# Patient Record
Sex: FEMALE | Race: ASIAN | NOT HISPANIC OR LATINO | Employment: OTHER | URBAN - METROPOLITAN AREA
[De-identification: names, ages, dates, MRNs, and addresses within clinical notes are randomized per-mention and may not be internally consistent; named-entity substitution may affect disease eponyms.]

---

## 2017-05-19 ENCOUNTER — HOSPITAL ENCOUNTER (OUTPATIENT)
Dept: LAB | Facility: MEDICAL CENTER | Age: 82
End: 2017-05-19
Attending: INTERNAL MEDICINE
Payer: MEDICARE

## 2017-05-19 ENCOUNTER — HOSPITAL ENCOUNTER (OUTPATIENT)
Facility: MEDICAL CENTER | Age: 82
End: 2017-05-19
Attending: INTERNAL MEDICINE
Payer: MEDICARE

## 2017-05-19 LAB
ALBUMIN SERPL BCP-MCNC: 4.2 G/DL (ref 3.2–4.9)
ALBUMIN/GLOB SERPL: 1.2 G/DL
ALP SERPL-CCNC: 54 U/L (ref 30–99)
ALT SERPL-CCNC: 12 U/L (ref 2–50)
ANION GAP SERPL CALC-SCNC: 8 MMOL/L (ref 0–11.9)
AST SERPL-CCNC: 34 U/L (ref 12–45)
BASOPHILS # BLD AUTO: 0.8 % (ref 0–1.8)
BASOPHILS # BLD: 0.05 K/UL (ref 0–0.12)
BILIRUB SERPL-MCNC: 0.4 MG/DL (ref 0.1–1.5)
BUN SERPL-MCNC: 31 MG/DL (ref 8–22)
CALCIUM SERPL-MCNC: 9.4 MG/DL (ref 8.5–10.5)
CHLORIDE SERPL-SCNC: 108 MMOL/L (ref 96–112)
CHOLEST SERPL-MCNC: 191 MG/DL (ref 100–199)
CO2 SERPL-SCNC: 20 MMOL/L (ref 20–33)
CREAT SERPL-MCNC: 1.41 MG/DL (ref 0.5–1.4)
EOSINOPHIL # BLD AUTO: 0.04 K/UL (ref 0–0.51)
EOSINOPHIL NFR BLD: 0.6 % (ref 0–6.9)
ERYTHROCYTE [DISTWIDTH] IN BLOOD BY AUTOMATED COUNT: 50.6 FL (ref 35.9–50)
GFR SERPL CREATININE-BSD FRML MDRD: 35 ML/MIN/1.73 M 2
GLOBULIN SER CALC-MCNC: 3.4 G/DL (ref 1.9–3.5)
GLUCOSE SERPL-MCNC: 87 MG/DL (ref 65–99)
HCT VFR BLD AUTO: 36.6 % (ref 37–47)
HDLC SERPL-MCNC: 46 MG/DL
HGB BLD-MCNC: 11.4 G/DL (ref 12–16)
IMM GRANULOCYTES # BLD AUTO: 0.02 K/UL (ref 0–0.11)
IMM GRANULOCYTES NFR BLD AUTO: 0.3 % (ref 0–0.9)
LDLC SERPL CALC-MCNC: 100 MG/DL
LYMPHOCYTES # BLD AUTO: 2.29 K/UL (ref 1–4.8)
LYMPHOCYTES NFR BLD: 35 % (ref 22–41)
MCH RBC QN AUTO: 31.8 PG (ref 27–33)
MCHC RBC AUTO-ENTMCNC: 31.1 G/DL (ref 33.6–35)
MCV RBC AUTO: 101.9 FL (ref 81.4–97.8)
MONOCYTES # BLD AUTO: 0.41 K/UL (ref 0–0.85)
MONOCYTES NFR BLD AUTO: 6.3 % (ref 0–13.4)
NEUTROPHILS # BLD AUTO: 3.74 K/UL (ref 2–7.15)
NEUTROPHILS NFR BLD: 57 % (ref 44–72)
NRBC # BLD AUTO: 0 K/UL
NRBC BLD AUTO-RTO: 0 /100 WBC
PLATELET # BLD AUTO: 205 K/UL (ref 164–446)
PMV BLD AUTO: 9.9 FL (ref 9–12.9)
POTASSIUM SERPL-SCNC: 4.8 MMOL/L (ref 3.6–5.5)
PROT SERPL-MCNC: 7.6 G/DL (ref 6–8.2)
RBC # BLD AUTO: 3.59 M/UL (ref 4.2–5.4)
SODIUM SERPL-SCNC: 136 MMOL/L (ref 135–145)
T4 SERPL-MCNC: 7.5 UG/DL (ref 4–12)
TRIGL SERPL-MCNC: 224 MG/DL (ref 0–149)
TSH SERPL DL<=0.005 MIU/L-ACNC: 0.67 UIU/ML (ref 0.3–3.7)
WBC # BLD AUTO: 6.6 K/UL (ref 4.8–10.8)

## 2017-05-19 PROCEDURE — 85025 COMPLETE CBC W/AUTO DIFF WBC: CPT

## 2017-05-19 PROCEDURE — 80053 COMPREHEN METABOLIC PANEL: CPT

## 2017-05-19 PROCEDURE — 80061 LIPID PANEL: CPT

## 2017-05-19 PROCEDURE — 84436 ASSAY OF TOTAL THYROXINE: CPT

## 2017-05-19 PROCEDURE — 82274 ASSAY TEST FOR BLOOD FECAL: CPT

## 2017-05-19 PROCEDURE — 84443 ASSAY THYROID STIM HORMONE: CPT

## 2017-05-19 PROCEDURE — 36415 COLL VENOUS BLD VENIPUNCTURE: CPT

## 2017-05-21 LAB — HEMOCCULT STL QL IA: POSITIVE

## 2017-09-19 ENCOUNTER — HOSPITAL ENCOUNTER (OUTPATIENT)
Dept: LAB | Facility: MEDICAL CENTER | Age: 82
End: 2017-09-19
Attending: INTERNAL MEDICINE
Payer: MEDICARE

## 2017-09-19 LAB
25(OH)D3 SERPL-MCNC: 32 NG/ML (ref 30–100)
ALBUMIN SERPL BCP-MCNC: 4.2 G/DL (ref 3.2–4.9)
ALBUMIN/GLOB SERPL: 1.2 G/DL
ALP SERPL-CCNC: 59 U/L (ref 30–99)
ALT SERPL-CCNC: 13 U/L (ref 2–50)
ANION GAP SERPL CALC-SCNC: 11 MMOL/L (ref 0–11.9)
AST SERPL-CCNC: 24 U/L (ref 12–45)
BASOPHILS # BLD AUTO: 0.5 % (ref 0–1.8)
BASOPHILS # BLD: 0.03 K/UL (ref 0–0.12)
BILIRUB SERPL-MCNC: 0.6 MG/DL (ref 0.1–1.5)
BUN SERPL-MCNC: 24 MG/DL (ref 8–22)
CALCIUM SERPL-MCNC: 9.8 MG/DL (ref 8.5–10.5)
CHLORIDE SERPL-SCNC: 108 MMOL/L (ref 96–112)
CHOLEST SERPL-MCNC: 232 MG/DL (ref 100–199)
CO2 SERPL-SCNC: 22 MMOL/L (ref 20–33)
CREAT SERPL-MCNC: 1.16 MG/DL (ref 0.5–1.4)
CREAT UR-MCNC: 131.1 MG/DL
EOSINOPHIL # BLD AUTO: 0.04 K/UL (ref 0–0.51)
EOSINOPHIL NFR BLD: 0.6 % (ref 0–6.9)
ERYTHROCYTE [DISTWIDTH] IN BLOOD BY AUTOMATED COUNT: 50.8 FL (ref 35.9–50)
EST. AVERAGE GLUCOSE BLD GHB EST-MCNC: 114 MG/DL
GFR SERPL CREATININE-BSD FRML MDRD: 44 ML/MIN/1.73 M 2
GLOBULIN SER CALC-MCNC: 3.6 G/DL (ref 1.9–3.5)
GLUCOSE SERPL-MCNC: 88 MG/DL (ref 65–99)
HBA1C MFR BLD: 5.6 % (ref 0–5.6)
HCT VFR BLD AUTO: 36.3 % (ref 37–47)
HDLC SERPL-MCNC: 50 MG/DL
HGB BLD-MCNC: 11.7 G/DL (ref 12–16)
IMM GRANULOCYTES # BLD AUTO: 0.02 K/UL (ref 0–0.11)
IMM GRANULOCYTES NFR BLD AUTO: 0.3 % (ref 0–0.9)
LDLC SERPL CALC-MCNC: 148 MG/DL
LYMPHOCYTES # BLD AUTO: 2.25 K/UL (ref 1–4.8)
LYMPHOCYTES NFR BLD: 34.5 % (ref 22–41)
MCH RBC QN AUTO: 32.1 PG (ref 27–33)
MCHC RBC AUTO-ENTMCNC: 32.2 G/DL (ref 33.6–35)
MCV RBC AUTO: 99.7 FL (ref 81.4–97.8)
MICROALBUMIN UR-MCNC: 11.6 MG/DL
MICROALBUMIN/CREAT UR: 88 MG/G (ref 0–30)
MONOCYTES # BLD AUTO: 0.37 K/UL (ref 0–0.85)
MONOCYTES NFR BLD AUTO: 5.7 % (ref 0–13.4)
NEUTROPHILS # BLD AUTO: 3.82 K/UL (ref 2–7.15)
NEUTROPHILS NFR BLD: 58.4 % (ref 44–72)
NRBC # BLD AUTO: 0 K/UL
NRBC BLD AUTO-RTO: 0 /100 WBC
PLATELET # BLD AUTO: 233 K/UL (ref 164–446)
PMV BLD AUTO: 9.4 FL (ref 9–12.9)
POTASSIUM SERPL-SCNC: 4.4 MMOL/L (ref 3.6–5.5)
PROT SERPL-MCNC: 7.8 G/DL (ref 6–8.2)
RBC # BLD AUTO: 3.64 M/UL (ref 4.2–5.4)
SODIUM SERPL-SCNC: 141 MMOL/L (ref 135–145)
TESTOST SERPL-MCNC: 13 NG/DL (ref 9–75)
TRIGL SERPL-MCNC: 168 MG/DL (ref 0–149)
URATE SERPL-MCNC: 6.1 MG/DL (ref 1.9–8.2)
WBC # BLD AUTO: 6.5 K/UL (ref 4.8–10.8)

## 2017-09-19 PROCEDURE — 80053 COMPREHEN METABOLIC PANEL: CPT

## 2017-09-19 PROCEDURE — 83036 HEMOGLOBIN GLYCOSYLATED A1C: CPT

## 2017-09-19 PROCEDURE — 85025 COMPLETE CBC W/AUTO DIFF WBC: CPT

## 2017-09-19 PROCEDURE — 84403 ASSAY OF TOTAL TESTOSTERONE: CPT

## 2017-09-19 PROCEDURE — 82306 VITAMIN D 25 HYDROXY: CPT

## 2017-09-19 PROCEDURE — 84550 ASSAY OF BLOOD/URIC ACID: CPT

## 2017-09-19 PROCEDURE — 82570 ASSAY OF URINE CREATININE: CPT

## 2017-09-19 PROCEDURE — 36415 COLL VENOUS BLD VENIPUNCTURE: CPT

## 2017-09-19 PROCEDURE — 82043 UR ALBUMIN QUANTITATIVE: CPT

## 2017-09-19 PROCEDURE — 80061 LIPID PANEL: CPT

## 2018-01-09 ENCOUNTER — HOSPITAL ENCOUNTER (OUTPATIENT)
Dept: LAB | Facility: MEDICAL CENTER | Age: 83
End: 2018-01-09
Attending: INTERNAL MEDICINE
Payer: MEDICARE

## 2018-01-09 LAB
ALBUMIN SERPL BCP-MCNC: 4.2 G/DL (ref 3.2–4.9)
ALBUMIN/GLOB SERPL: 1.3 G/DL
ALP SERPL-CCNC: 50 U/L (ref 30–99)
ALT SERPL-CCNC: 11 U/L (ref 2–50)
ANION GAP SERPL CALC-SCNC: 10 MMOL/L (ref 0–11.9)
APPEARANCE UR: CLEAR
AST SERPL-CCNC: 22 U/L (ref 12–45)
BACTERIA #/AREA URNS HPF: ABNORMAL /HPF
BASOPHILS # BLD AUTO: 1.3 % (ref 0–1.8)
BASOPHILS # BLD: 0.07 K/UL (ref 0–0.12)
BILIRUB SERPL-MCNC: 0.5 MG/DL (ref 0.1–1.5)
BILIRUB UR QL STRIP.AUTO: NEGATIVE
BUN SERPL-MCNC: 32 MG/DL (ref 8–22)
CALCIUM SERPL-MCNC: 9.9 MG/DL (ref 8.5–10.5)
CHLORIDE SERPL-SCNC: 112 MMOL/L (ref 96–112)
CHOLEST SERPL-MCNC: 229 MG/DL (ref 100–199)
CO2 SERPL-SCNC: 19 MMOL/L (ref 20–33)
COLOR UR: YELLOW
CREAT SERPL-MCNC: 1.38 MG/DL (ref 0.5–1.4)
EOSINOPHIL # BLD AUTO: 0.07 K/UL (ref 0–0.51)
EOSINOPHIL NFR BLD: 1.3 % (ref 0–6.9)
EPI CELLS #/AREA URNS HPF: NEGATIVE /HPF
ERYTHROCYTE [DISTWIDTH] IN BLOOD BY AUTOMATED COUNT: 49.1 FL (ref 35.9–50)
GLOBULIN SER CALC-MCNC: 3.3 G/DL (ref 1.9–3.5)
GLUCOSE SERPL-MCNC: 77 MG/DL (ref 65–99)
GLUCOSE UR STRIP.AUTO-MCNC: NEGATIVE MG/DL
HCT VFR BLD AUTO: 32.7 % (ref 37–47)
HDLC SERPL-MCNC: 46 MG/DL
HGB BLD-MCNC: 10.5 G/DL (ref 12–16)
HYALINE CASTS #/AREA URNS LPF: ABNORMAL /LPF
IMM GRANULOCYTES # BLD AUTO: 0.02 K/UL (ref 0–0.11)
IMM GRANULOCYTES NFR BLD AUTO: 0.4 % (ref 0–0.9)
IRON SATN MFR SERPL: 31 % (ref 15–55)
IRON SERPL-MCNC: 79 UG/DL (ref 40–170)
KETONES UR STRIP.AUTO-MCNC: NEGATIVE MG/DL
LDLC SERPL CALC-MCNC: 126 MG/DL
LEUKOCYTE ESTERASE UR QL STRIP.AUTO: ABNORMAL
LYMPHOCYTES # BLD AUTO: 1.73 K/UL (ref 1–4.8)
LYMPHOCYTES NFR BLD: 32.9 % (ref 22–41)
MCH RBC QN AUTO: 31.2 PG (ref 27–33)
MCHC RBC AUTO-ENTMCNC: 32.1 G/DL (ref 33.6–35)
MCV RBC AUTO: 97 FL (ref 81.4–97.8)
MICRO URNS: ABNORMAL
MONOCYTES # BLD AUTO: 0.3 K/UL (ref 0–0.85)
MONOCYTES NFR BLD AUTO: 5.7 % (ref 0–13.4)
NEUTROPHILS # BLD AUTO: 3.07 K/UL (ref 2–7.15)
NEUTROPHILS NFR BLD: 58.4 % (ref 44–72)
NITRITE UR QL STRIP.AUTO: POSITIVE
NRBC # BLD AUTO: 0 K/UL
NRBC BLD-RTO: 0 /100 WBC
PH UR STRIP.AUTO: 5.5 [PH]
PLATELET # BLD AUTO: 243 K/UL (ref 164–446)
PMV BLD AUTO: 9.1 FL (ref 9–12.9)
POTASSIUM SERPL-SCNC: 3.8 MMOL/L (ref 3.6–5.5)
PROT SERPL-MCNC: 7.5 G/DL (ref 6–8.2)
PROT UR QL STRIP: NEGATIVE MG/DL
RBC # BLD AUTO: 3.37 M/UL (ref 4.2–5.4)
RBC # URNS HPF: ABNORMAL /HPF
RBC UR QL AUTO: NEGATIVE
SODIUM SERPL-SCNC: 141 MMOL/L (ref 135–145)
SP GR UR STRIP.AUTO: 1.02
TIBC SERPL-MCNC: 251 UG/DL (ref 250–450)
TRIGL SERPL-MCNC: 286 MG/DL (ref 0–149)
UROBILINOGEN UR STRIP.AUTO-MCNC: 0.2 MG/DL
WBC # BLD AUTO: 5.3 K/UL (ref 4.8–10.8)
WBC #/AREA URNS HPF: ABNORMAL /HPF

## 2018-01-09 PROCEDURE — 83540 ASSAY OF IRON: CPT

## 2018-01-09 PROCEDURE — 82728 ASSAY OF FERRITIN: CPT

## 2018-01-09 PROCEDURE — 80053 COMPREHEN METABOLIC PANEL: CPT

## 2018-01-09 PROCEDURE — 80061 LIPID PANEL: CPT

## 2018-01-09 PROCEDURE — 85025 COMPLETE CBC W/AUTO DIFF WBC: CPT

## 2018-01-09 PROCEDURE — 81001 URINALYSIS AUTO W/SCOPE: CPT

## 2018-01-09 PROCEDURE — 83550 IRON BINDING TEST: CPT

## 2018-01-09 PROCEDURE — 36415 COLL VENOUS BLD VENIPUNCTURE: CPT

## 2018-01-10 LAB — FERRITIN SERPL-MCNC: 187.3 NG/ML (ref 10–291)

## 2019-02-28 ENCOUNTER — HOSPITAL ENCOUNTER (OUTPATIENT)
Dept: LAB | Facility: MEDICAL CENTER | Age: 84
End: 2019-02-28
Attending: INTERNAL MEDICINE
Payer: MEDICARE

## 2019-02-28 LAB
ALBUMIN SERPL BCP-MCNC: 4.1 G/DL (ref 3.2–4.9)
ALBUMIN/GLOB SERPL: 1.1 G/DL
ALP SERPL-CCNC: 60 U/L (ref 30–99)
ALT SERPL-CCNC: 11 U/L (ref 2–50)
ANION GAP SERPL CALC-SCNC: 10 MMOL/L (ref 0–11.9)
APPEARANCE UR: CLEAR
AST SERPL-CCNC: 25 U/L (ref 12–45)
BACTERIA #/AREA URNS HPF: ABNORMAL /HPF
BASOPHILS # BLD AUTO: 0.3 % (ref 0–1.8)
BASOPHILS # BLD: 0.02 K/UL (ref 0–0.12)
BILIRUB SERPL-MCNC: 0.3 MG/DL (ref 0.1–1.5)
BILIRUB UR QL STRIP.AUTO: NEGATIVE
BUN SERPL-MCNC: 35 MG/DL (ref 8–22)
CALCIUM SERPL-MCNC: 10.3 MG/DL (ref 8.5–10.5)
CHLORIDE SERPL-SCNC: 105 MMOL/L (ref 96–112)
CHOLEST SERPL-MCNC: 217 MG/DL (ref 100–199)
CO2 SERPL-SCNC: 25 MMOL/L (ref 20–33)
COLOR UR: YELLOW
CREAT SERPL-MCNC: 1.58 MG/DL (ref 0.5–1.4)
CREAT UR-MCNC: 29.6 MG/DL
EOSINOPHIL # BLD AUTO: 0.03 K/UL (ref 0–0.51)
EOSINOPHIL NFR BLD: 0.4 % (ref 0–6.9)
EPI CELLS #/AREA URNS HPF: NEGATIVE /HPF
ERYTHROCYTE [DISTWIDTH] IN BLOOD BY AUTOMATED COUNT: 47.4 FL (ref 35.9–50)
GLOBULIN SER CALC-MCNC: 3.6 G/DL (ref 1.9–3.5)
GLUCOSE SERPL-MCNC: 103 MG/DL (ref 65–99)
GLUCOSE UR STRIP.AUTO-MCNC: NEGATIVE MG/DL
HCT VFR BLD AUTO: 38.9 % (ref 37–47)
HDLC SERPL-MCNC: 45 MG/DL
HGB BLD-MCNC: 12.4 G/DL (ref 12–16)
HYALINE CASTS #/AREA URNS LPF: ABNORMAL /LPF
IMM GRANULOCYTES # BLD AUTO: 0.01 K/UL (ref 0–0.11)
IMM GRANULOCYTES NFR BLD AUTO: 0.1 % (ref 0–0.9)
KETONES UR STRIP.AUTO-MCNC: NEGATIVE MG/DL
LDLC SERPL CALC-MCNC: 106 MG/DL
LEUKOCYTE ESTERASE UR QL STRIP.AUTO: ABNORMAL
LYMPHOCYTES # BLD AUTO: 2.87 K/UL (ref 1–4.8)
LYMPHOCYTES NFR BLD: 37.6 % (ref 22–41)
MCH RBC QN AUTO: 31.1 PG (ref 27–33)
MCHC RBC AUTO-ENTMCNC: 31.9 G/DL (ref 33.6–35)
MCV RBC AUTO: 97.5 FL (ref 81.4–97.8)
MICRO URNS: ABNORMAL
MICROALBUMIN UR-MCNC: 4.2 MG/DL
MICROALBUMIN/CREAT UR: 142 MG/G (ref 0–30)
MONOCYTES # BLD AUTO: 0.47 K/UL (ref 0–0.85)
MONOCYTES NFR BLD AUTO: 6.2 % (ref 0–13.4)
NEUTROPHILS # BLD AUTO: 4.23 K/UL (ref 2–7.15)
NEUTROPHILS NFR BLD: 55.4 % (ref 44–72)
NITRITE UR QL STRIP.AUTO: NEGATIVE
NRBC # BLD AUTO: 0 K/UL
NRBC BLD-RTO: 0 /100 WBC
PH UR STRIP.AUTO: 7 [PH]
PLATELET # BLD AUTO: 279 K/UL (ref 164–446)
PMV BLD AUTO: 8.9 FL (ref 9–12.9)
POTASSIUM SERPL-SCNC: 4.3 MMOL/L (ref 3.6–5.5)
PROT SERPL-MCNC: 7.7 G/DL (ref 6–8.2)
PROT UR QL STRIP: NEGATIVE MG/DL
RBC # BLD AUTO: 3.99 M/UL (ref 4.2–5.4)
RBC # URNS HPF: ABNORMAL /HPF
RBC UR QL AUTO: NEGATIVE
SODIUM SERPL-SCNC: 140 MMOL/L (ref 135–145)
SP GR UR STRIP.AUTO: 1.01
TRIGL SERPL-MCNC: 329 MG/DL (ref 0–149)
UROBILINOGEN UR STRIP.AUTO-MCNC: 0.2 MG/DL
WBC # BLD AUTO: 7.6 K/UL (ref 4.8–10.8)
WBC #/AREA URNS HPF: ABNORMAL /HPF

## 2019-02-28 PROCEDURE — 80053 COMPREHEN METABOLIC PANEL: CPT

## 2019-02-28 PROCEDURE — 81001 URINALYSIS AUTO W/SCOPE: CPT

## 2019-02-28 PROCEDURE — 85025 COMPLETE CBC W/AUTO DIFF WBC: CPT

## 2019-02-28 PROCEDURE — 82570 ASSAY OF URINE CREATININE: CPT

## 2019-02-28 PROCEDURE — 36415 COLL VENOUS BLD VENIPUNCTURE: CPT

## 2019-02-28 PROCEDURE — 80061 LIPID PANEL: CPT

## 2019-02-28 PROCEDURE — 82043 UR ALBUMIN QUANTITATIVE: CPT

## 2019-03-06 ENCOUNTER — HOSPITAL ENCOUNTER (OUTPATIENT)
Facility: MEDICAL CENTER | Age: 84
End: 2019-03-07
Attending: EMERGENCY MEDICINE | Admitting: INTERNAL MEDICINE
Payer: MEDICARE

## 2019-03-06 ENCOUNTER — APPOINTMENT (OUTPATIENT)
Dept: RADIOLOGY | Facility: MEDICAL CENTER | Age: 84
End: 2019-03-06
Attending: EMERGENCY MEDICINE
Payer: MEDICARE

## 2019-03-06 DIAGNOSIS — D64.9 CHRONIC ANEMIA: ICD-10-CM

## 2019-03-06 DIAGNOSIS — E86.0 DEHYDRATION: ICD-10-CM

## 2019-03-06 DIAGNOSIS — R55 NEAR SYNCOPE: ICD-10-CM

## 2019-03-06 PROBLEM — I10 ESSENTIAL HYPERTENSION: Status: ACTIVE | Noted: 2019-03-06

## 2019-03-06 PROBLEM — R73.9 HYPERGLYCEMIA: Status: ACTIVE | Noted: 2019-03-06

## 2019-03-06 PROBLEM — E16.2 HYPOGLYCEMIA: Status: ACTIVE | Noted: 2019-03-06

## 2019-03-06 LAB
ALBUMIN SERPL BCP-MCNC: 4.1 G/DL (ref 3.2–4.9)
ALBUMIN/GLOB SERPL: 1.2 G/DL
ALP SERPL-CCNC: 67 U/L (ref 30–99)
ALT SERPL-CCNC: 12 U/L (ref 2–50)
ANION GAP SERPL CALC-SCNC: 8 MMOL/L (ref 0–11.9)
APPEARANCE UR: CLEAR
AST SERPL-CCNC: 20 U/L (ref 12–45)
BACTERIA #/AREA URNS HPF: ABNORMAL /HPF
BASOPHILS # BLD AUTO: 0.7 % (ref 0–1.8)
BASOPHILS # BLD: 0.04 K/UL (ref 0–0.12)
BILIRUB SERPL-MCNC: 0.4 MG/DL (ref 0.1–1.5)
BILIRUB UR QL STRIP.AUTO: NEGATIVE
BUN SERPL-MCNC: 33 MG/DL (ref 8–22)
CALCIUM SERPL-MCNC: 9.7 MG/DL (ref 8.5–10.5)
CHLORIDE SERPL-SCNC: 108 MMOL/L (ref 96–112)
CO2 SERPL-SCNC: 21 MMOL/L (ref 20–33)
COLOR UR: YELLOW
CREAT SERPL-MCNC: 1.18 MG/DL (ref 0.5–1.4)
EKG IMPRESSION: NORMAL
EOSINOPHIL # BLD AUTO: 0.03 K/UL (ref 0–0.51)
EOSINOPHIL NFR BLD: 0.5 % (ref 0–6.9)
EPI CELLS #/AREA URNS HPF: ABNORMAL /HPF
ERYTHROCYTE [DISTWIDTH] IN BLOOD BY AUTOMATED COUNT: 45.7 FL (ref 35.9–50)
GLOBULIN SER CALC-MCNC: 3.4 G/DL (ref 1.9–3.5)
GLUCOSE BLD-MCNC: 107 MG/DL (ref 65–99)
GLUCOSE SERPL-MCNC: 106 MG/DL (ref 65–99)
GLUCOSE UR STRIP.AUTO-MCNC: NEGATIVE MG/DL
HCT VFR BLD AUTO: 37.2 % (ref 37–47)
HGB BLD-MCNC: 11.8 G/DL (ref 12–16)
IMM GRANULOCYTES # BLD AUTO: 0.01 K/UL (ref 0–0.11)
IMM GRANULOCYTES NFR BLD AUTO: 0.2 % (ref 0–0.9)
KETONES UR STRIP.AUTO-MCNC: NEGATIVE MG/DL
LEUKOCYTE ESTERASE UR QL STRIP.AUTO: ABNORMAL
LYMPHOCYTES # BLD AUTO: 1.66 K/UL (ref 1–4.8)
LYMPHOCYTES NFR BLD: 29.6 % (ref 22–41)
MCH RBC QN AUTO: 30.3 PG (ref 27–33)
MCHC RBC AUTO-ENTMCNC: 31.7 G/DL (ref 33.6–35)
MCV RBC AUTO: 95.6 FL (ref 81.4–97.8)
MICRO URNS: ABNORMAL
MONOCYTES # BLD AUTO: 0.28 K/UL (ref 0–0.85)
MONOCYTES NFR BLD AUTO: 5 % (ref 0–13.4)
NEUTROPHILS # BLD AUTO: 3.58 K/UL (ref 2–7.15)
NEUTROPHILS NFR BLD: 64 % (ref 44–72)
NITRITE UR QL STRIP.AUTO: POSITIVE
NRBC # BLD AUTO: 0 K/UL
NRBC BLD-RTO: 0 /100 WBC
PH UR STRIP.AUTO: 6.5 [PH]
PLATELET # BLD AUTO: 223 K/UL (ref 164–446)
PMV BLD AUTO: 8.7 FL (ref 9–12.9)
POTASSIUM SERPL-SCNC: 3.6 MMOL/L (ref 3.6–5.5)
PROT SERPL-MCNC: 7.5 G/DL (ref 6–8.2)
PROT UR QL STRIP: NEGATIVE MG/DL
RBC # BLD AUTO: 3.89 M/UL (ref 4.2–5.4)
RBC # URNS HPF: ABNORMAL /HPF
RBC UR QL AUTO: NEGATIVE
SODIUM SERPL-SCNC: 137 MMOL/L (ref 135–145)
SP GR UR STRIP.AUTO: 1.01
TROPONIN I SERPL-MCNC: <0.01 NG/ML (ref 0–0.04)
UROBILINOGEN UR STRIP.AUTO-MCNC: 0.2 MG/DL
WBC # BLD AUTO: 5.6 K/UL (ref 4.8–10.8)
WBC #/AREA URNS HPF: ABNORMAL /HPF

## 2019-03-06 PROCEDURE — G0378 HOSPITAL OBSERVATION PER HR: HCPCS

## 2019-03-06 PROCEDURE — 84484 ASSAY OF TROPONIN QUANT: CPT

## 2019-03-06 PROCEDURE — 71045 X-RAY EXAM CHEST 1 VIEW: CPT

## 2019-03-06 PROCEDURE — 36415 COLL VENOUS BLD VENIPUNCTURE: CPT

## 2019-03-06 PROCEDURE — 87077 CULTURE AEROBIC IDENTIFY: CPT

## 2019-03-06 PROCEDURE — 93005 ELECTROCARDIOGRAM TRACING: CPT | Performed by: EMERGENCY MEDICINE

## 2019-03-06 PROCEDURE — A9270 NON-COVERED ITEM OR SERVICE: HCPCS | Performed by: INTERNAL MEDICINE

## 2019-03-06 PROCEDURE — 700102 HCHG RX REV CODE 250 W/ 637 OVERRIDE(OP): Performed by: INTERNAL MEDICINE

## 2019-03-06 PROCEDURE — 87086 URINE CULTURE/COLONY COUNT: CPT

## 2019-03-06 PROCEDURE — 99220 PR INITIAL OBSERVATION CARE,LEVL III: CPT | Performed by: INTERNAL MEDICINE

## 2019-03-06 PROCEDURE — 82962 GLUCOSE BLOOD TEST: CPT

## 2019-03-06 PROCEDURE — 87186 SC STD MICRODIL/AGAR DIL: CPT

## 2019-03-06 PROCEDURE — 70450 CT HEAD/BRAIN W/O DYE: CPT

## 2019-03-06 PROCEDURE — 85025 COMPLETE CBC W/AUTO DIFF WBC: CPT

## 2019-03-06 PROCEDURE — 93005 ELECTROCARDIOGRAM TRACING: CPT

## 2019-03-06 PROCEDURE — 81001 URINALYSIS AUTO W/SCOPE: CPT

## 2019-03-06 PROCEDURE — 80053 COMPREHEN METABOLIC PANEL: CPT

## 2019-03-06 PROCEDURE — 700105 HCHG RX REV CODE 258: Performed by: INTERNAL MEDICINE

## 2019-03-06 PROCEDURE — 99285 EMERGENCY DEPT VISIT HI MDM: CPT

## 2019-03-06 RX ORDER — AMLODIPINE BESYLATE 10 MG/1
10 TABLET ORAL DAILY
Status: ON HOLD | COMMUNITY
End: 2020-06-13

## 2019-03-06 RX ORDER — IMIPRAMINE HCL 50 MG
50 TABLET ORAL NIGHTLY
Status: DISCONTINUED | OUTPATIENT
Start: 2019-03-06 | End: 2019-03-06

## 2019-03-06 RX ORDER — BISACODYL 10 MG
10 SUPPOSITORY, RECTAL RECTAL
Status: DISCONTINUED | OUTPATIENT
Start: 2019-03-06 | End: 2019-03-07 | Stop reason: HOSPADM

## 2019-03-06 RX ORDER — LOSARTAN POTASSIUM AND HYDROCHLOROTHIAZIDE 12.5; 1 MG/1; MG/1
1 TABLET ORAL DAILY
Status: ON HOLD | COMMUNITY
End: 2020-06-13

## 2019-03-06 RX ORDER — SODIUM CHLORIDE 9 MG/ML
INJECTION, SOLUTION INTRAVENOUS CONTINUOUS
Status: DISCONTINUED | OUTPATIENT
Start: 2019-03-06 | End: 2019-03-07 | Stop reason: HOSPADM

## 2019-03-06 RX ORDER — HYDROCHLOROTHIAZIDE 12.5 MG/1
12.5 TABLET ORAL
Status: DISCONTINUED | OUTPATIENT
Start: 2019-03-07 | End: 2019-03-07 | Stop reason: HOSPADM

## 2019-03-06 RX ORDER — LOSARTAN POTASSIUM 50 MG/1
100 TABLET ORAL
Status: DISCONTINUED | OUTPATIENT
Start: 2019-03-07 | End: 2019-03-07 | Stop reason: HOSPADM

## 2019-03-06 RX ORDER — POLYETHYLENE GLYCOL 3350 17 G/17G
1 POWDER, FOR SOLUTION ORAL
Status: DISCONTINUED | OUTPATIENT
Start: 2019-03-06 | End: 2019-03-07 | Stop reason: HOSPADM

## 2019-03-06 RX ORDER — AMOXICILLIN 250 MG
2 CAPSULE ORAL 2 TIMES DAILY
Status: DISCONTINUED | OUTPATIENT
Start: 2019-03-06 | End: 2019-03-07 | Stop reason: HOSPADM

## 2019-03-06 RX ORDER — LISINOPRIL AND HYDROCHLOROTHIAZIDE 12.5; 1 MG/1; MG/1
1 TABLET ORAL DAILY
Status: DISCONTINUED | OUTPATIENT
Start: 2019-03-06 | End: 2019-03-06

## 2019-03-06 RX ORDER — ACETAMINOPHEN 325 MG/1
650 TABLET ORAL EVERY 6 HOURS PRN
Status: DISCONTINUED | OUTPATIENT
Start: 2019-03-06 | End: 2019-03-07 | Stop reason: HOSPADM

## 2019-03-06 RX ORDER — ATORVASTATIN CALCIUM 20 MG/1
20 TABLET, FILM COATED ORAL NIGHTLY
Status: DISCONTINUED | OUTPATIENT
Start: 2019-03-06 | End: 2019-03-06

## 2019-03-06 RX ORDER — OXYBUTYNIN CHLORIDE 5 MG/1
5 TABLET ORAL
Status: DISCONTINUED | OUTPATIENT
Start: 2019-03-06 | End: 2019-03-06

## 2019-03-06 RX ADMIN — SENNOSIDES AND DOCUSATE SODIUM 2 TABLET: 8.6; 5 TABLET ORAL at 23:21

## 2019-03-06 RX ADMIN — SODIUM CHLORIDE: 9 INJECTION, SOLUTION INTRAVENOUS at 23:23

## 2019-03-06 ASSESSMENT — ENCOUNTER SYMPTOMS
SPUTUM PRODUCTION: 0
FEVER: 0
SHORTNESS OF BREATH: 0
NECK PAIN: 0
COUGH: 0
BACK PAIN: 0
HEADACHES: 0
NAUSEA: 0
NERVOUS/ANXIOUS: 0
BLURRED VISION: 0
EYE DISCHARGE: 0
MYALGIAS: 0
STRIDOR: 0
FOCAL WEAKNESS: 0
EYE REDNESS: 0
EYE PAIN: 0
HEARTBURN: 0
SEIZURES: 0
DIZZINESS: 1
ABDOMINAL PAIN: 0
PALPITATIONS: 0
DIARRHEA: 0
INSOMNIA: 0
VOMITING: 0
WEIGHT LOSS: 0
ORTHOPNEA: 0
CHILLS: 0
DEPRESSION: 0

## 2019-03-06 ASSESSMENT — COPD QUESTIONNAIRES
HAVE YOU SMOKED AT LEAST 100 CIGARETTES IN YOUR ENTIRE LIFE: NO/DON'T KNOW
IN THE PAST 12 MONTHS DO YOU DO LESS THAN YOU USED TO BECAUSE OF YOUR BREATHING PROBLEMS: DISAGREE/UNSURE
DO YOU EVER COUGH UP ANY MUCUS OR PHLEGM?: NO/ONLY WITH OCCASIONAL COLDS OR INFECTIONS
DURING THE PAST 4 WEEKS HOW MUCH DID YOU FEEL SHORT OF BREATH: NONE/LITTLE OF THE TIME
COPD SCREENING SCORE: 2

## 2019-03-06 ASSESSMENT — LIFESTYLE VARIABLES
ALCOHOL_USE: NO
DO YOU DRINK ALCOHOL: NO
EVER_SMOKED: NEVER

## 2019-03-06 ASSESSMENT — PATIENT HEALTH QUESTIONNAIRE - PHQ9
1. LITTLE INTEREST OR PLEASURE IN DOING THINGS: NOT AT ALL
SUM OF ALL RESPONSES TO PHQ9 QUESTIONS 1 AND 2: 0
2. FEELING DOWN, DEPRESSED, IRRITABLE, OR HOPELESS: NOT AT ALL

## 2019-03-06 NOTE — ED TRIAGE NOTES
Patient ambulated from lobby to room 3. Steady gait.  Patient in gown on monitor.  Patient in no acute distress. AOx4.  Patient was doing chores and felt dizziness. Patient states she could not see, and feel down.  Patient states she started new BP medication 1 week ago (8 days ago).  Granddaughter at bedside. Chart up for ERP.

## 2019-03-06 NOTE — ED TRIAGE NOTES
Chief Complaint   Patient presents with   • Dizziness     dizziness, fell, called granddaughter     EKG done prior to triage. Pt ambulatory. Reports she got dizzy today, fell to ground, unknown if LOC. Called granddaughter, taken here. FSBS 107. C/o headache. No neuro deficits, no facial droop, equal .    /80   Pulse 92   Temp 36.4 °C (97.5 °F) (Temporal)   Resp 18   Ht 1.524 m (5')   Wt 52 kg (114 lb 10.2 oz)   SpO2 99%   BMI 22.39 kg/m²     Pt Informed regarding triage process and verbalized understanding to inform triage tech or RN for any changes in condition.  Placed in lobby.

## 2019-03-07 ENCOUNTER — PATIENT OUTREACH (OUTPATIENT)
Dept: HEALTH INFORMATION MANAGEMENT | Facility: OTHER | Age: 84
End: 2019-03-07

## 2019-03-07 ENCOUNTER — APPOINTMENT (OUTPATIENT)
Dept: CARDIOLOGY | Facility: MEDICAL CENTER | Age: 84
End: 2019-03-07
Attending: INTERNAL MEDICINE
Payer: MEDICARE

## 2019-03-07 VITALS
TEMPERATURE: 99 F | OXYGEN SATURATION: 96 % | DIASTOLIC BLOOD PRESSURE: 69 MMHG | WEIGHT: 112.21 LBS | BODY MASS INDEX: 22.03 KG/M2 | HEIGHT: 60 IN | HEART RATE: 79 BPM | RESPIRATION RATE: 16 BRPM | SYSTOLIC BLOOD PRESSURE: 128 MMHG

## 2019-03-07 LAB
LV EJECT FRACT  99904: 55
LV EJECT FRACT MOD 2C 99903: 54.33
LV EJECT FRACT MOD 4C 99902: 54.27
LV EJECT FRACT MOD BP 99901: 53.97

## 2019-03-07 PROCEDURE — 93306 TTE W/DOPPLER COMPLETE: CPT

## 2019-03-07 PROCEDURE — 93306 TTE W/DOPPLER COMPLETE: CPT | Mod: 26 | Performed by: INTERNAL MEDICINE

## 2019-03-07 PROCEDURE — A9270 NON-COVERED ITEM OR SERVICE: HCPCS | Performed by: INTERNAL MEDICINE

## 2019-03-07 PROCEDURE — G0378 HOSPITAL OBSERVATION PER HR: HCPCS

## 2019-03-07 PROCEDURE — 700102 HCHG RX REV CODE 250 W/ 637 OVERRIDE(OP): Performed by: INTERNAL MEDICINE

## 2019-03-07 PROCEDURE — 99217 PR OBSERVATION CARE DISCHARGE: CPT | Performed by: INTERNAL MEDICINE

## 2019-03-07 RX ADMIN — SENNOSIDES AND DOCUSATE SODIUM 2 TABLET: 8.6; 5 TABLET ORAL at 05:08

## 2019-03-07 RX ADMIN — LOSARTAN POTASSIUM 100 MG: 50 TABLET ORAL at 05:08

## 2019-03-07 RX ADMIN — HYDROCHLOROTHIAZIDE 12.5 MG: 12.5 TABLET ORAL at 05:08

## 2019-03-07 ASSESSMENT — PATIENT HEALTH QUESTIONNAIRE - PHQ9
2. FEELING DOWN, DEPRESSED, IRRITABLE, OR HOPELESS: NOT AT ALL
SUM OF ALL RESPONSES TO PHQ9 QUESTIONS 1 AND 2: 0
1. LITTLE INTEREST OR PLEASURE IN DOING THINGS: NOT AT ALL

## 2019-03-07 NOTE — PROGRESS NOTES
Received pt in bed. A&Ox4. No acute events overnight. No diff swallowing. Pt denies pain/discomfort. Denies dizziness @ this time/ blurred vision/ H/A/Chest pain/ Pressure. Unsteady gait noted/ Bed alarmed for safety/1 Assist to BR. Voids w/ freq/ urgency/ offered briefs- refuses. LBM 3/4- Bowel regimen given. Poor sleep pattern/ slept minimally NOC. Snacks given. Hourly and PRN rounding in place.

## 2019-03-07 NOTE — DISCHARGE SUMMARY
Discharge Summary    CHIEF COMPLAINT ON ADMISSION  Chief Complaint   Patient presents with   • Dizziness     dizziness, fell, called granddaughter   • Near Syncopal       Reason for Admission  Syncope     Admission Date  3/6/2019    CODE STATUS  Full Code    HPI & HOSPITAL COURSE  This is a 89 y.o. female here with near syncope episode.  Please refer to H&P for detail.  She stated that she felt dizzy and lightheaded while she was doing laundry yesterday.  Patient denies chest pain, palpitation, syncope episode.  The patient was admitted for observation.  Overnight she is doing well.  She denies dizziness or lightheadedness.  Echocardiogram was done and showed LVEF 55% with no significant valvular heart disease.  She stated that she is back to her baseline and would like to be discharged home.  I saw and examined her today.       Therefore, she is discharged in fair and stable condition to home with close outpatient follow-up.        Discharge Date  3/7/19    FOLLOW UP ITEMS POST DISCHARGE      DISCHARGE DIAGNOSES  Active Problems:    Near syncope POA: Yes    Essential hypertension POA: Yes    Hyperglycemia POA: Yes  Resolved Problems:    * No resolved hospital problems. *      FOLLOW UP  No future appointments.  Mike Guajardo M.D.  [address] [place] [id number]  [phone]    In 1 week        MEDICATIONS ON DISCHARGE     Medication List      CONTINUE taking these medications      Instructions   amLODIPine 10 MG Tabs  Commonly known as:  NORVASC   Take 10 mg by mouth every day.  Dose:  10 mg     losartan-hydrochlorothiazide 100-12.5 MG per tablet  Commonly known as:  HYZAAR   Take 1 Tab by mouth every day.  Dose:  1 Tab            Allergies  No Known Allergies    DIET  Orders Placed This Encounter   Procedures   • Diet Order Regular     Standing Status:   Standing     Number of Occurrences:   1     Order Specific Question:   Diet:     Answer:   Regular [1]       ACTIVITY  As tolerated.  Weight bearing as  tolerated    CONSULTATIONS      PROCEDURES      LABORATORY  Lab Results   Component Value Date    SODIUM 137 03/06/2019    POTASSIUM 3.6 03/06/2019    CHLORIDE 108 03/06/2019    CO2 21 03/06/2019    GLUCOSE 106 (H) 03/06/2019    BUN 33 (H) 03/06/2019    CREATININE 1.18 03/06/2019    CREATININE 1.2 02/10/2009        Lab Results   Component Value Date    WBC 5.6 03/06/2019    HEMOGLOBIN 11.8 (L) 03/06/2019    HEMATOCRIT 37.2 03/06/2019    PLATELETCT 223 03/06/2019        Total time of the discharge process exceeds 36 minutes.

## 2019-03-07 NOTE — ASSESSMENT & PLAN NOTE
We will check orthostatic hypotension  Monitor on telemetry closely  Check echocardiogram  Fall precautions

## 2019-03-07 NOTE — DISCHARGE INSTRUCTIONS
Discharge Instructions    Discharged to home by car with self. Discharged via wheelchair, hospital escort: Yes.  Special equipment needed: Not Applicable    Be sure to schedule a follow-up appointment with your primary care doctor or any specialists as instructed.     Discharge Plan:   Diet Plan: Discussed  Activity Level: Discussed  Confirmed Follow up Appointment: Patient to Call and Schedule Appointment  Confirmed Symptoms Management: Discussed  Medication Reconciliation Updated: Yes  Influenza Vaccine Indication: Not indicated: Previously immunized this influenza season and > 8 years of age    I understand that a diet low in cholesterol, fat, and sodium is recommended for good health. Unless I have been given specific instructions below for another diet, I accept this instruction as my diet prescription.   Other diet:     Special Instructions: None    · Is patient discharged on Warfarin / Coumadin?   No Fall Prevention in the Home  Falls can cause injuries and can affect people from all age groups. There are many simple things that you can do to make your home safe and to help prevent falls.  What can I do on the outside of my home?  · Regularly repair the edges of walkways and driveways and fix any cracks.  · Remove high doorway thresholds.  · Trim any shrubbery on the main path into your home.  · Use bright outdoor lighting.  · Clear walkways of debris and clutter, including tools and rocks.  · Regularly check that handrails are securely fastened and in good repair. Both sides of any steps should have handrails.  · Install guardrails along the edges of any raised decks or porches.  · Have leaves, snow, and ice cleared regularly.  · Use sand or salt on walkways during winter months.  · In the garage, clean up any spills right away, including grease or oil spills.  What can I do in the bathroom?  · Use night lights.  · Install grab bars by the toilet and in the tub and shower. Do not use towel bars as grab  bars.  · Use non-skid mats or decals on the floor of the tub or shower.  · If you need to sit down while you are in the shower, use a plastic, non-slip stool.  · Keep the floor dry. Immediately clean up any water that spills on the floor.  · Remove soap buildup in the tub or shower on a regular basis.  · Attach bath mats securely with double-sided non-slip rug tape.  · Remove throw rugs and other tripping hazards from the floor.  What can I do in the bedroom?  · Use night lights.  · Make sure that a bedside light is easy to reach.  · Do not use oversized bedding that drapes onto the floor.  · Have a firm chair that has side arms to use for getting dressed.  · Remove throw rugs and other tripping hazards from the floor.  What can I do in the kitchen?  · Clean up any spills right away.  · Avoid walking on wet floors.  · Place frequently used items in easy-to-reach places.  · If you need to reach for something above you, use a sturdy step stool that has a grab bar.  · Keep electrical cables out of the way.  · Do not use floor polish or wax that makes floors slippery. If you have to use wax, make sure that it is non-skid floor wax.  · Remove throw rugs and other tripping hazards from the floor.  What can I do in the stairways?  · Do not leave any items on the stairs.  · Make sure that there are handrails on both sides of the stairs. Fix handrails that are broken or loose. Make sure that handrails are as long as the stairways.  · Check any carpeting to make sure that it is firmly attached to the stairs. Fix any carpet that is loose or worn.  · Avoid having throw rugs at the top or bottom of stairways, or secure the rugs with carpet tape to prevent them from moving.  · Make sure that you have a light switch at the top of the stairs and the bottom of the stairs. If you do not have them, have them installed.  What are some other fall prevention tips?  · Wear closed-toe shoes that fit well and support your feet. Wear shoes  that have rubber soles or low heels.  · When you use a stepladder, make sure that it is completely opened and that the sides are firmly locked. Have someone hold the ladder while you are using it. Do not climb a closed stepladder.  · Add color or contrast paint or tape to grab bars and handrails in your home. Place contrasting color strips on the first and last steps.  · Use mobility aids as needed, such as canes, walkers, scooters, and crutches.  · Turn on lights if it is dark. Replace any light bulbs that burn out.  · Set up furniture so that there are clear paths. Keep the furniture in the same spot.  · Fix any uneven floor surfaces.  · Choose a carpet design that does not hide the edge of steps of a stairway.  · Be aware of any and all pets.  · Review your medicines with your healthcare provider. Some medicines can cause dizziness or changes in blood pressure, which increase your risk of falling.  Talk with your health care provider about other ways that you can decrease your risk of falls. This may include working with a physical therapist or  to improve your strength, balance, and endurance.  This information is not intended to replace advice given to you by your health care provider. Make sure you discuss any questions you have with your health care provider.  Document Released: 12/08/2003 Document Revised: 05/16/2017 Document Reviewed: 01/22/2016  [employer] Patient Education © 2017 [employer]      Depression / Suicide Risk    As you are discharged from this [facility] facility, it is important to learn how to keep safe from harming yourself.    Recognize the warning signs:  · Abrupt changes in personality, positive or negative- including increase in energy   · Giving away possessions  · Change in eating patterns- significant weight changes-  positive or negative  · Change in sleeping patterns- unable to sleep or sleeping all the time   · Unwillingness or inability to  communicate  · Depression  · Unusual sadness, discouragement and loneliness  · Talk of wanting to die  · Neglect of personal appearance   · Rebelliousness- reckless behavior  · Withdrawal from people/activities they love  · Confusion- inability to concentrate     If you or a loved one observes any of these behaviors or has concerns about self-harm, here's what you can do:  · Talk about it- your feelings and reasons for harming yourself  · Remove any means that you might use to hurt yourself (examples: pills, rope, extension cords, firearm)  · Get professional help from the community (Mental Health, Substance Abuse, psychological counseling)  · Do not be alone:Call your Safe Contact- someone whom you trust who will be there for you.  · Call your local CRISIS HOTLINE 718-4837 or 125-326-0359  · Call your local Children's Mobile Crisis Response Team [place] (600) 891-9153 or www.[facility]  · Call the toll free National Suicide Prevention Hotlines   · National Suicide Prevention Lifeline 167-549-DJYN (6135)  · National Hope Line Network 800-SUICIDE (664-7693)

## 2019-03-07 NOTE — PROGRESS NOTES
MD visited pt,for discharge, IV D/C'd. Discharge instructions provided to pt. Pt states understanding. Pt states all questions have been answered. Copy of discharge provided to pt. Signed copy in chart.  Pt states that all personal belongings are in possession.pt's grand daughter called and informed pt leaving,she said pt can pay cab with her debit card. Pt escorted off unit without incident.

## 2019-03-07 NOTE — ED PROVIDER NOTES
ED Provider Note    CHIEF COMPLAINT  Chief Complaint   Patient presents with   • Dizziness     dizziness, fell, called granddaughter   • Near Syncopal       HPI  Julito Mckenzie is a 89 y.o. female who presents after syncopal event although possibly near syncopal.  She became dizzy and blacked out falling to the ground.  She woke with a headache.  She is unsure as to whether she was completely unconscious or not.  She then called her granddaughter.  There was no witness to the event, unknown whether there was a seizure involved.  The patient did not have incontinence nor bite her tongue.  No fever.  No vision change.  Her granddaughter and daughter states she is acting her usual self.  No recent chest pain.  She denies bloody stools.    REVIEW OF SYSTEMS    Constitutional: No fever  Respiratory: No shortness of breath  Cardiac: Syncope  Gastrointestinal: No abdominal pain  Musculoskeletal: No neck or back pain  Neurologic: Headache       All other systems are negative.       PAST MEDICAL HISTORY  Past Medical History:   Diagnosis Date   • Chronic migraine    • Hypertension        FAMILY HISTORY  History reviewed. No pertinent family history.    SOCIAL HISTORY  Social History     Social History   • Marital status:      Spouse name: N/A   • Number of children: N/A   • Years of education: N/A     Social History Main Topics   • Smoking status: Never Smoker   • Smokeless tobacco: Never Used   • Alcohol use No   • Drug use: No   • Sexual activity: Not on file     Other Topics Concern   • Not on file     Social History Narrative   • No narrative on file       SURGICAL HISTORY  History reviewed. No pertinent surgical history.    CURRENT MEDICATIONS  Home Medications     Reviewed by Brittany Patel R.N. (Registered Nurse) on 03/06/19 at 1834  Med List Status: Complete   Medication Last Dose Status   amLODIPine (NORVASC) 10 MG Tab 3/6/2019 Active   losartan-hydrochlorothiazide (HYZAAR) 100-12.5 MG per tablet  3/6/2019 Active                ALLERGIES  No Known Allergies    PHYSICAL EXAM  VITAL SIGNS: BP (!) 162/81   Pulse 94   Temp 36.1 °C (97 °F) (Temporal)   Resp 20   Ht 1.524 m (5')   Wt 50.9 kg (112 lb 3.4 oz)   SpO2 97%   Breastfeeding? No   BMI 21.92 kg/m²   Constitutional:  Non-toxic appearance.   HENT: No facial swelling.  Tenderness posterior scalp, no crepitance or deformity or swelling  Eyes: Anicteric, no conjunctivitis.  Pupils are equal, extraocular motions normal, no nystagmus     Cardiovascular: Normal heart rate, Normal rhythm  Pulmonary:  No wheezing, No rales.   Gastrointestinal: Soft, No tenderness, No distention  Skin: Warm, Dry, No cyanosis.  No asymmetric edema, no bruising  Neurologic: Speech is clear, follows commands, facial expression is symmetrical.   strength and leg strength normal.  Sensation intact  Psychiatric: Affect normal,  Mood normal.  Patient is calm and cooperative  Musculoskeletal: Neck, spine, ribs and pelvis are nontender    EKG/Labs  Results for orders placed or performed during the hospital encounter of 03/06/19   CBC WITH DIFFERENTIAL   Result Value Ref Range    WBC 5.6 4.8 - 10.8 K/uL    RBC 3.89 (L) 4.20 - 5.40 M/uL    Hemoglobin 11.8 (L) 12.0 - 16.0 g/dL    Hematocrit 37.2 37.0 - 47.0 %    MCV 95.6 81.4 - 97.8 fL    MCH 30.3 27.0 - 33.0 pg    MCHC 31.7 (L) 33.6 - 35.0 g/dL    RDW 45.7 35.9 - 50.0 fL    Platelet Count 223 164 - 446 K/uL    MPV 8.7 (L) 9.0 - 12.9 fL    Neutrophils-Polys 64.00 44.00 - 72.00 %    Lymphocytes 29.60 22.00 - 41.00 %    Monocytes 5.00 0.00 - 13.40 %    Eosinophils 0.50 0.00 - 6.90 %    Basophils 0.70 0.00 - 1.80 %    Immature Granulocytes 0.20 0.00 - 0.90 %    Nucleated RBC 0.00 /100 WBC    Neutrophils (Absolute) 3.58 2.00 - 7.15 K/uL    Lymphs (Absolute) 1.66 1.00 - 4.80 K/uL    Monos (Absolute) 0.28 0.00 - 0.85 K/uL    Eos (Absolute) 0.03 0.00 - 0.51 K/uL    Baso (Absolute) 0.04 0.00 - 0.12 K/uL    Immature Granulocytes (abs) 0.01  0.00 - 0.11 K/uL    NRBC (Absolute) 0.00 K/uL   COMP METABOLIC PANEL   Result Value Ref Range    Sodium 137 135 - 145 mmol/L    Potassium 3.6 3.6 - 5.5 mmol/L    Chloride 108 96 - 112 mmol/L    Co2 21 20 - 33 mmol/L    Anion Gap 8.0 0.0 - 11.9    Glucose 106 (H) 65 - 99 mg/dL    Bun 33 (H) 8 - 22 mg/dL    Creatinine 1.18 0.50 - 1.40 mg/dL    Calcium 9.7 8.5 - 10.5 mg/dL    AST(SGOT) 20 12 - 45 U/L    ALT(SGPT) 12 2 - 50 U/L    Alkaline Phosphatase 67 30 - 99 U/L    Total Bilirubin 0.4 0.1 - 1.5 mg/dL    Albumin 4.1 3.2 - 4.9 g/dL    Total Protein 7.5 6.0 - 8.2 g/dL    Globulin 3.4 1.9 - 3.5 g/dL    A-G Ratio 1.2 g/dL   TROPONIN   Result Value Ref Range    Troponin I <0.01 0.00 - 0.04 ng/mL   ESTIMATED GFR   Result Value Ref Range    GFR If  52 (A) >60 mL/min/1.73 m 2    GFR If Non  43 (A) >60 mL/min/1.73 m 2   URINALYSIS CULTURE, IF INDICATED   Result Value Ref Range    Color Yellow     Character Clear     Specific Gravity 1.012 <1.035    Ph 6.5 5.0 - 8.0    Glucose Negative Negative mg/dL    Ketones Negative Negative mg/dL    Protein Negative Negative mg/dL    Bilirubin Negative Negative    Urobilinogen, Urine 0.2 Negative    Nitrite Positive (A) Negative    Leukocyte Esterase Small (A) Negative    Occult Blood Negative Negative    Micro Urine Req Microscopic    URINE MICROSCOPIC (W/UA)   Result Value Ref Range    WBC 20-50 (A) /hpf    RBC 0-2 /hpf    Bacteria Many (A) None /hpf    Epithelial Cells Rare /hpf   ACCU-CHEK GLUCOSE   Result Value Ref Range    Glucose - Accu-Ck 107 (H) 65 - 99 mg/dL   EKG   Result Value Ref Range    Report       [facility]    Test Date:  2019  Pt Name:    SHERRIE DEYA           Department: ER  MRN:        [id number]                      Room:       T203  Gender:     Female                       Technician: [id number]  :        [id number]                   Requested By:ER TRIAGE PROTOCOL  Order #:    [id number]                     Reading MD: JONATHAN SERNA MD    Measurements  Intervals                                Axis  Rate:       84                           P:          0  NY:         157                          QRS:        -11  QRSD:       90                           T:          50  QT:         384  QTc:        454    Interpretive Statements  SINUS RHYTHM  VENTRICULAR PREMATURE COMPLEX  Compared to ECG 10/31/2014 10:16:49  Ventricular premature complex(es) now present  Left-axis deviation no longer present  T-wave abnormality no longer present    Electronically Signed On 3-6-2019 19:02:19 PST by JONATHAN SERNA MD           RADIOLOGY/PROCEDURES  CT-HEAD W/O   Final Result         1. No acute intracranial findings. No evidence of acute intracranial hemorrhage or mass lesion.      2. White matter lucencies most consistent with chronic small vessel ischemic change.               DX-CHEST-PORTABLE (1 VIEW)   Final Result         Interstitial and patchy opacities in the lung bases could relate to atelectasis, dependent edema or interstitial lung disease.      No pulmonary infiltrates or airspace consolidations are noted.      EC-ECHOCARDIOGRAM COMPLETE W/O CONT    (Results Pending)         COURSE & MEDICAL DECISION MAKING  Pertinent Labs & Imaging studies reviewed. (See chart for details)  Patient with syncope, followed with headache.  CT scan of the head was negative for acute findings.  Negative troponin, no ischemic change on her EKG.  Differential diagnosis includes orthostasis, vasovagal, arrhythmia.  At the patient's age without significant history of prior syncope, she will be admitted for ongoing workup and stabilization.  Elevated BUN to creatinine is suggestive of some element of dehydration.  Her anemia appears stable, chronic.    FINAL IMPRESSION     1. Near syncope    2. Dehydration    3. Chronic anemia                    Electronically signed by: Jonathan Serna, 3/6/2019 7:02 PM

## 2019-03-07 NOTE — DISCHARGE PLANNING
Care Transition Team Assessment    Spoke with patient at bedside. Anticipate no needs at present time. Daughter will be ride @ D/C.    Information Source  Orientation : Oriented x 4  Information Given By: Patient  Informant's Name: Francis    Readmission Evaluation  Is this a readmission?: No    Interdisciplinary Discharge Planning  Does Admitting Nurse Feel This Could be a Complex Discharge?: No  Primary Care Physician: Bennett  Lives with - Patient's Self Care Capacity: Alone and Able to Care For Self  Patient or legal guardian wants to designate a caregiver (see row info): No  Support Systems: Children  Housing / Facility: 1 Story Apartment / Condo  Do You Take your Prescribed Medications Regularly: Yes  Able to Return to Previous ADL's: Yes  Mobility Issues: No  Prior Services: Home-Independent  Patient Expects to be Discharged to:: Home  Assistance Needed: No  Durable Medical Equipment: Not Applicable    Discharge Preparedness  What are your discharge supports?: Child  Prior Functional Level: Ambulatory    Functional Assesment  Prior Functional Level: Ambulatory    Finances  Prescription Coverage: Yes  Anticipated Discharge Information  Anticipated discharge disposition: Home  Discharge Address: [address] [place] [address]  Discharge Contact Phone Number: [phone]

## 2019-03-07 NOTE — H&P
Hospital Medicine History and Physical      Date of Service  3/6/2019    Chief Complaint  Chief Complaint   Patient presents with   • Dizziness     dizziness, fell, called granddaughter   • Near Syncopal       History of Presenting Illness  Jonah is a 89 y.o. female PMH of hypertension, who presents with dizziness and blurry vision earlier today while she was doing laundry.  The patient stated that she almost felt like passing out but she denies loss of consciousness.  She has one episode or similar symptoms in the past as well.  Currently the patient denies any dizziness, chest pain, palpitation.  In the ER her initial workup were negative.  She will be admitted for observation.    Primary Care Physician  Mike Guajardo M.D.      Code Status  Full code    Review of Systems  Review of Systems   Constitutional: Negative for chills, fever and weight loss.   HENT: Negative for congestion and nosebleeds.    Eyes: Negative for blurred vision, pain, discharge and redness.   Respiratory: Negative for cough, sputum production, shortness of breath and stridor.    Cardiovascular: Negative for chest pain, palpitations and orthopnea.   Gastrointestinal: Negative for abdominal pain, diarrhea, heartburn, nausea and vomiting.   Genitourinary: Negative for dysuria, frequency and urgency.   Musculoskeletal: Negative for back pain, myalgias and neck pain.   Skin: Negative for itching and rash.   Neurological: Positive for dizziness. Negative for focal weakness, seizures and headaches.   Psychiatric/Behavioral: Negative for depression. The patient is not nervous/anxious and does not have insomnia.      Please see HPI, all other systems were reviewed and are negative (AMA/CMS criteria)     Past Medical History  Past Medical History:   Diagnosis Date   • Chronic migraine    • Hypertension        Surgical History  No pertinent past surgical history    Medications  No current facility-administered medications on file prior to  encounter.      Current Outpatient Prescriptions on File Prior to Encounter   Medication Sig Dispense Refill   • atorvastatin (LIPITOR) 20 MG Tab Take 20 mg by mouth every evening.     • imipramine (TOFRANIL) 50 MG Tab Take 50 mg by mouth every evening.     • oxybutynin (DITROPAN) 5 MG Tab Take 5 mg by mouth every bedtime.     • asa/apap/caffeine (EXCEDRIN) 250-250-65 MG Tab Take 2 Tabs by mouth every 6 hours as needed for Headache.     • lisinopril-hydrochlorothiazide (PRINZIDE, ZESTORETIC) 10-12.5 MG per tablet Take 1 Tab by mouth every day.       Family History    No pertinent family history    Social History  Social History   Substance Use Topics   • Smoking status: Never Smoker   • Smokeless tobacco: Not on file   • Alcohol use No       Allergies  No Known Allergies     Physical Exam  Laboratory   Hemodynamics  Temp (24hrs), Av.4 °C (97.5 °F), Min:36.4 °C (97.5 °F), Max:36.4 °C (97.5 °F)   Temperature: 36.4 °C (97.5 °F)  Pulse  Av  Min: 74  Max: 92 Heart Rate (Monitored): 82  Blood Pressure : 143/80, NIBP: 115/76      Respiratory      Respiration: 17, Pulse Oximetry: 94 %        RLL Breath Sounds: Diminished, LLL Breath Sounds: Diminished    Physical Exam   Constitutional: She is oriented to person, place, and time. No distress.   HENT:   Head: Normocephalic and atraumatic.   Mouth/Throat: Oropharynx is clear and moist.   Eyes: Pupils are equal, round, and reactive to light. Conjunctivae and EOM are normal.   Neck: Normal range of motion. Neck supple. No tracheal deviation present. No thyromegaly present.   Cardiovascular: Normal rate and regular rhythm.    No murmur heard.  Pulmonary/Chest: Effort normal and breath sounds normal. No respiratory distress. She has no wheezes.   Abdominal: Soft. Bowel sounds are normal. She exhibits no distension. There is no tenderness.   Musculoskeletal: She exhibits no edema or tenderness.   Neurological: She is alert and oriented to person, place, and time. No  cranial nerve deficit.   Skin: Skin is warm and dry. She is not diaphoretic. No erythema.   Psychiatric: She has a normal mood and affect. Her behavior is normal. Thought content normal.       Recent Labs      03/06/19   1434   WBC  5.6   RBC  3.89*   HEMOGLOBIN  11.8*   HEMATOCRIT  37.2   MCV  95.6   MCH  30.3   MCHC  31.7*   RDW  45.7   PLATELETCT  223   MPV  8.7*     Recent Labs      03/06/19   1500   SODIUM  137   POTASSIUM  3.6   CHLORIDE  108   CO2  21   GLUCOSE  106*   BUN  33*   CREATININE  1.18   CALCIUM  9.7     Recent Labs      03/06/19   1500   ALTSGPT  12   ASTSGOT  20   ALKPHOSPHAT  67   TBILIRUBIN  0.4   GLUCOSE  106*                 Lab Results   Component Value Date    TROPONINI <0.01 03/06/2019       Imaging  CT-HEAD W/O   Final Result         1. No acute intracranial findings. No evidence of acute intracranial hemorrhage or mass lesion.      2. White matter lucencies most consistent with chronic small vessel ischemic change.               DX-CHEST-PORTABLE (1 VIEW)   Final Result         Interstitial and patchy opacities in the lung bases could relate to atelectasis, dependent edema or interstitial lung disease.      No pulmonary infiltrates or airspace consolidations are noted.      EC-ECHOCARDIOGRAM COMPLETE W/O CONT    (Results Pending)     EKG  per my independant read:  QTc: 454, HR: 84, Normal Sinus Rhythm, no ST/T changes     Assessment/Plan     I anticipate this patient is appropriate for observation status at this time.    Near syncope- (present on admission)   Assessment & Plan    We will check orthostatic hypotension  Monitor on telemetry closely  Check echocardiogram  Fall precautions     Hyperglycemia- (present on admission)   Assessment & Plan    No history of DM     Essential hypertension- (present on admission)   Assessment & Plan    BP was normal in ER  Continue outpatient meds         Prophylaxis:  lovenox

## 2019-03-07 NOTE — ED NOTES
Med rec updated and complete.  Allergies reviewed.  Pt is unable to recall her medications and requested this writer  Talk to family. Pt confirmed last doses taken. No oral antibiotic use in last  30 days at home.

## 2019-03-07 NOTE — PROGRESS NOTES
Pt eager to be d/c'd this AM- reports her cat was left alone and needs care. Will not have anyone picking up from the hospital if d/c'd. When inquired if  grandaugter who is supportive would be able to stop by and take her home,  pt reports that her grandaughter will not stop by because if she does, her daughter will be upset that she  is helping her grandmother too much.

## 2019-03-08 ENCOUNTER — PATIENT OUTREACH (OUTPATIENT)
Dept: HEALTH INFORMATION MANAGEMENT | Facility: OTHER | Age: 84
End: 2019-03-08

## 2019-03-09 LAB
BACTERIA UR CULT: ABNORMAL
BACTERIA UR CULT: ABNORMAL
SIGNIFICANT IND 70042: ABNORMAL
SITE SITE: ABNORMAL
SOURCE SOURCE: ABNORMAL

## 2019-03-11 ENCOUNTER — PATIENT OUTREACH (OUTPATIENT)
Dept: HEALTH INFORMATION MANAGEMENT | Facility: OTHER | Age: 84
End: 2019-03-11

## 2019-03-11 NOTE — PROGRESS NOTES
Outbound call to [facility] for post discharge medication review. Patient denies difficulty with adherence. Patient denies having any lightheadedness or dizziness since discharge. Patient states she has follow-up with Dr. Guajardo 3/14/19. It is noted, patient's most recent CrCl is calculated at 22 mL/min. Combination losartan/ hctz is not recommended with CrCl under 30 mL/min as HCTZ is usually not effective with CrCL < 30. Notified Dr. Guajardo of above.     Evonne Gallgaher, PharmD

## 2019-04-05 ENCOUNTER — PATIENT OUTREACH (OUTPATIENT)
Dept: HEALTH INFORMATION MANAGEMENT | Facility: OTHER | Age: 84
End: 2019-04-05

## 2019-04-09 NOTE — PROGRESS NOTES
Patient Julito Mckenzie was admitted to [facility] on 3/6/19 for near syncope.  The patient was discharged on 3/7/19 and instructed to follow up with her PCP.   The patient followed up with her PCP on 3/13/19.  No future appointments are scheduled.   Low LACE score - No PPS conducted.

## 2019-09-20 ENCOUNTER — OFFICE VISIT (OUTPATIENT)
Dept: URGENT CARE | Facility: PHYSICIAN GROUP | Age: 84
End: 2019-09-20
Payer: MEDICARE

## 2019-09-20 ENCOUNTER — HOSPITAL ENCOUNTER (OUTPATIENT)
Dept: RADIOLOGY | Facility: MEDICAL CENTER | Age: 84
End: 2019-09-20
Attending: NURSE PRACTITIONER
Payer: MEDICARE

## 2019-09-20 ENCOUNTER — HOSPITAL ENCOUNTER (OUTPATIENT)
Facility: MEDICAL CENTER | Age: 84
End: 2019-09-20
Attending: NURSE PRACTITIONER
Payer: MEDICARE

## 2019-09-20 VITALS
HEIGHT: 61 IN | DIASTOLIC BLOOD PRESSURE: 80 MMHG | OXYGEN SATURATION: 96 % | WEIGHT: 114 LBS | BODY MASS INDEX: 21.52 KG/M2 | HEART RATE: 103 BPM | SYSTOLIC BLOOD PRESSURE: 128 MMHG | TEMPERATURE: 100.9 F

## 2019-09-20 DIAGNOSIS — M51.36 DDD (DEGENERATIVE DISC DISEASE), LUMBAR: ICD-10-CM

## 2019-09-20 DIAGNOSIS — N30.01 ACUTE CYSTITIS WITH HEMATURIA: ICD-10-CM

## 2019-09-20 DIAGNOSIS — M54.50 LUMBAR PAIN: ICD-10-CM

## 2019-09-20 DIAGNOSIS — M43.10 RETROLISTHESIS OF VERTEBRAE: ICD-10-CM

## 2019-09-20 LAB
APPEARANCE UR: CLEAR
BILIRUB UR STRIP-MCNC: NEGATIVE MG/DL
COLOR UR AUTO: YELLOW
GLUCOSE UR STRIP.AUTO-MCNC: NEGATIVE MG/DL
KETONES UR STRIP.AUTO-MCNC: NEGATIVE MG/DL
LEUKOCYTE ESTERASE UR QL STRIP.AUTO: NORMAL
NITRITE UR QL STRIP.AUTO: POSITIVE
PH UR STRIP.AUTO: 6.5 [PH] (ref 5–8)
PROT UR QL STRIP: 30 MG/DL
RBC UR QL AUTO: NORMAL
SP GR UR STRIP.AUTO: 1.01
UROBILINOGEN UR STRIP-MCNC: 0.2 MG/DL

## 2019-09-20 PROCEDURE — 81002 URINALYSIS NONAUTO W/O SCOPE: CPT | Performed by: NURSE PRACTITIONER

## 2019-09-20 PROCEDURE — 87077 CULTURE AEROBIC IDENTIFY: CPT

## 2019-09-20 PROCEDURE — 87186 SC STD MICRODIL/AGAR DIL: CPT

## 2019-09-20 PROCEDURE — 96372 THER/PROPH/DIAG INJ SC/IM: CPT | Performed by: NURSE PRACTITIONER

## 2019-09-20 PROCEDURE — 99204 OFFICE O/P NEW MOD 45 MIN: CPT | Mod: 25 | Performed by: NURSE PRACTITIONER

## 2019-09-20 PROCEDURE — 72100 X-RAY EXAM L-S SPINE 2/3 VWS: CPT

## 2019-09-20 PROCEDURE — 87086 URINE CULTURE/COLONY COUNT: CPT

## 2019-09-20 RX ORDER — CEFTRIAXONE SODIUM 250 MG/1
250 INJECTION, POWDER, FOR SOLUTION INTRAMUSCULAR; INTRAVENOUS ONCE
Status: COMPLETED | OUTPATIENT
Start: 2019-09-20 | End: 2019-09-20

## 2019-09-20 RX ORDER — LIDOCAINE 50 MG/G
1 PATCH TOPICAL EVERY 24 HOURS
Qty: 10 PATCH | Refills: 0 | Status: SHIPPED
Start: 2019-09-20 | End: 2020-03-06

## 2019-09-20 RX ORDER — CEFDINIR 300 MG/1
300 CAPSULE ORAL EVERY 12 HOURS
Qty: 10 CAP | Refills: 0 | Status: SHIPPED | OUTPATIENT
Start: 2019-09-20 | End: 2019-09-25

## 2019-09-20 RX ADMIN — CEFTRIAXONE SODIUM 250 MG: 250 INJECTION, POWDER, FOR SOLUTION INTRAMUSCULAR; INTRAVENOUS at 13:49

## 2019-09-20 ASSESSMENT — ENCOUNTER SYMPTOMS
BACK PAIN: 1
FLANK PAIN: 0
PALPITATIONS: 0
CHILLS: 0
FEVER: 0
FALLS: 0

## 2019-09-20 NOTE — PROGRESS NOTES
"  Subjective:     Julito Mckenzie is a 89 y.o. female who presents for Back Pain (lower, onset this am.)      Back pain, yesterday.Pain never like this. Pain 8/10. \"hurts\". Denies injury. Sharp. No pain or difficulty with urination. Denies fever. Has used Bengay in the past for back pain. History of arthritis. Glove on hand for arthritis, stating the cold increased pain. Hurts with both sitting and standing. Tried a hot bat, without relief. No cancer history, or seroid use. No fall, uses cane for ambulation. No dysuuria, has frequency, not new. Unknown med for arthritis.     Recently had been off htn meds, got a refill by PCP and restarted them. GFR 43.       Past Medical History:   Diagnosis Date   • Chronic migraine    • Hypertension        History reviewed. No pertinent surgical history.    Social History     Socioeconomic History   • Marital status:      Spouse name: Not on file   • Number of children: Not on file   • Years of education: Not on file   • Highest education level: Not on file   Occupational History   • Not on file   Social Needs   • Financial resource strain: Not on file   • Food insecurity:     Worry: Not on file     Inability: Not on file   • Transportation needs:     Medical: Not on file     Non-medical: Not on file   Tobacco Use   • Smoking status: Never Smoker   • Smokeless tobacco: Never Used   Substance and Sexual Activity   • Alcohol use: No   • Drug use: No   • Sexual activity: Not on file   Lifestyle   • Physical activity:     Days per week: Not on file     Minutes per session: Not on file   • Stress: Not on file   Relationships   • Social connections:     Talks on phone: Not on file     Gets together: Not on file     Attends Jain service: Not on file     Active member of club or organization: Not on file     Attends meetings of clubs or organizations: Not on file     Relationship status: Not on file   • Intimate partner violence:     Fear of current or ex partner: Not on " "file     Emotionally abused: Not on file     Physically abused: Not on file     Forced sexual activity: Not on file   Other Topics Concern   • Not on file   Social History Narrative   • Not on file        History reviewed. No pertinent family history.     No Known Allergies    Review of Systems   Constitutional: Negative for chills, fever and malaise/fatigue.   Respiratory: Negative for cough and shortness of breath.    Cardiovascular: Negative for chest pain and palpitations.   Gastrointestinal: Negative for abdominal pain, blood in stool, diarrhea, nausea and vomiting.   Genitourinary: Positive for frequency. Negative for dysuria, flank pain and hematuria.   Musculoskeletal: Positive for back pain and joint pain. Negative for falls.        No numbness or weakness in legs. No groin numbness. No loss of bowel or bladder.    Skin: Negative for rash.   All other systems reviewed and are negative.       Objective:   /80   Pulse (!) 103   Temp (!) 38.3 °C (100.9 °F)   Ht 1.549 m (5' 1\")   Wt 51.7 kg (114 lb)   SpO2 96%   BMI 21.54 kg/m²     Physical Exam   Constitutional: She is oriented to person, place, and time. She appears well-developed. No distress.   HENT:   Head: Normocephalic.   Right Ear: External ear normal.   Left Ear: External ear normal.   Nose: Nose normal.   Mouth/Throat: Oropharynx is clear and moist. No oropharyngeal exudate.   Eyes: Pupils are equal, round, and reactive to light. Conjunctivae are normal.   Neck: Normal range of motion. Neck supple.   Cardiovascular: Normal heart sounds and intact distal pulses.   Pulmonary/Chest: Effort normal and breath sounds normal.   Abdominal: Soft. Normal appearance and bowel sounds are normal. She exhibits no distension. There is no tenderness. There is no rigidity, no guarding and no CVA tenderness.   Musculoskeletal: She exhibits tenderness. She exhibits no deformity.        Lumbar back: She exhibits tenderness and bony tenderness. She exhibits no " swelling, no edema and no deformity.   Midline tenderness to palpation over lumbar, and bilateral para spinal tenderness to palpation.   Neurological: She is alert and oriented to person, place, and time.   Skin: Skin is warm and dry. No rash noted.   Psychiatric: She has a normal mood and affect. Her speech is normal and behavior is normal. Judgment and thought content normal.   Vitals reviewed.      Assessment/Plan:   1. Acute cystitis with hematuria  - POCT Urinalysis  - Urine Culture; Future  - cefdinir (OMNICEF) 300 MG Cap; Take 1 Cap by mouth every 12 hours for 5 days.  Dispense: 10 Cap; Refill: 0 (CrCl =30 mL/minute: No dosage adjustment necessary.)  - cefTRIAXone (ROCEPHIN) injection 250 mg  - lidocaine (XYLOCAINE) 1 % injection 2.1 mL  -Oral hydration.  Results for orders placed or performed in visit on 09/20/19   POCT Urinalysis   Result Value Ref Range    POC Color yellow Negative    POC Appearance clear Negative    POC Leukocyte Esterase small Negative    POC Nitrites positive Negative    POC Urobiligen 0.2 Negative (0.2) mg/dL    POC Protein 30 Negative mg/dL    POC Urine PH 6.5 5.0 - 8.0    POC Blood trace-lysed Negative    POC Specific Gravity 1.015 <1.005 - >1.030    POC Ketones negative Negative mg/dL    POC Bilirubin negative Negative mg/dL    POC Glucose negative Negative mg/dL       2. Lumbar pain  - DX-LUMBAR SPINE-2 OR 3 VIEWS; Future  - POCT Urinalysis  - lidocaine (LIDODERM) 5 % Patch; Apply 1 Patch to skin as directed every 24 hours.  Dispense: 10 Patch; Refill: 0    3. Retrolisthesis of vertebrae    4. DDD (degenerative disc disease), lumbar    1.  No compression deformity or acute fracture is identified.    2.  Grade 1 anterolisthesis at L5-S1. Mild anterolisthesis at L4-L5.    3.  Retrolisthesis at L2-L4.    4.  Degenerative disc disease and facet arthropathy which are most prominent at the lower end of the lumbar spine.  -Follow up with PCP.    Monitor for fevers, weakness, decreased  alertness. Strict ER precautions: Persistent fever, worsening pain or symptoms, no improvement of symptoms in 2 days, flank pain, increased back pain, difficulty with urination, weakness, abdominal pain, decreased alertness, palpitations, tachycardia.     Differential diagnosis, natural history, supportive care, and indications for immediate follow-up discussed.

## 2019-09-20 NOTE — PATIENT INSTRUCTIONS
Urinary Tract Infection, Adult  Introduction  A urinary tract infection (UTI) is an infection of any part of the urinary tract. The urinary tract includes the:  · Kidneys.  · Ureters.  · Bladder.  · Urethra.  These organs make, store, and get rid of pee (urine) in the body.  Follow these instructions at home:  · Take over-the-counter and prescription medicines only as told by your doctor.  · If you were prescribed an antibiotic medicine, take it as told by your doctor. Do not stop taking the antibiotic even if you start to feel better.  · Avoid the following drinks:  ¨ Alcohol.  ¨ Caffeine.  ¨ Tea.  ¨ Carbonated drinks.  · Drink enough fluid to keep your pee clear or pale yellow.  · Keep all follow-up visits as told by your doctor. This is important.  · Make sure to:  ¨ Empty your bladder often and completely. Do not to hold pee for long periods of time.  ¨ Empty your bladder before and after sex.  ¨ Wipe from front to back after a bowel movement if you are female. Use each tissue one time when you wipe.  Contact a doctor if:  · You have back pain.  · You have a fever.  · You feel sick to your stomach (nauseous).  · You throw up (vomit).  · Your symptoms do not get better after 3 days.  · Your symptoms go away and then come back.  Get help right away if:  · You have very bad back pain.  · You have very bad lower belly (abdominal) pain.  · You are throwing up and cannot keep down any medicines or water.  This information is not intended to replace advice given to you by your health care provider. Make sure you discuss any questions you have with your health care provider.  Document Released: 06/05/2009 Document Revised: 05/25/2017 Document Reviewed: 11/07/2016  © 2017 [employer]  Degenerative Disk Disease  Introduction  Degenerative disk disease is a condition caused by the changes that occur in spinal disks as you grow older. Spinal disks are soft and compressible disks located between the bones of your spine  (vertebrae). These disks act like shock absorbers. Degenerative disk disease can affect the whole spine. However, the neck and lower back are most commonly affected. Many changes can occur in the spinal disks with aging, such as:  · The spinal disks may dry and shrink.  · Small tears may occur in the tough, outer covering of the disk (annulus).  · The disk space may become smaller due to loss of water.  · Abnormal growths in the bone (spurs) may occur. This can put pressure on the nerve roots exiting the spinal canal, causing pain.  · The spinal canal may become narrowed.  What increases the risk?  · Being overweight.  · Having a family history of degenerative disk disease.  · Smoking.  · There is increased risk if you are doing heavy lifting or have a sudden injury.  What are the signs or symptoms?  Symptoms vary from person to person and may include:  · Pain that varies in intensity. Some people have no pain, while others have severe pain. The location of the pain depends on the part of your backbone that is affected.  ¨ You will have neck or arm pain if a disk in the neck area is affected.  ¨ You will have pain in your back, buttocks, or legs if a disk in the lower back is affected.  · Pain that becomes worse while bending, reaching up, or with twisting movements.  · Pain that may start gradually and then get worse as time passes. It may also start after a major or minor injury.  · Numbness or tingling in the arms or legs.  How is this diagnosed?  Your health care provider will ask you about your symptoms and about activities or habits that may cause the pain. He or she may also ask about any injuries, diseases, or treatments you have had. Your health care provider will examine you to check for the range of movement that is possible in the affected area, to check for strength in your extremities, and to check for sensation in the areas of the arms and legs supplied by different nerve roots. You may also  have:  · An X-ray of the spine.  · Other imaging tests, such as MRI.  How is this treated?  Your health care provider will advise you on the best plan for treatment. Treatment may include:  · Medicines.  · Rehabilitation exercises.  Follow these instructions at home:  · Follow proper lifting and walking techniques as advised by your health care provider.  · Maintain good posture.  · Exercise regularly as advised by your health care provider.  · Perform relaxation exercises.  · Change your sitting, standing, and sleeping habits as advised by your health care provider.  · Change positions frequently.  · Lose weight or maintain a healthy weight as advised by your health care provider.  · Do not use any tobacco products, including cigarettes, chewing tobacco, or electronic cigarettes. If you need help quitting, ask your health care provider.  · Wear supportive footwear.  · Take medicines only as directed by your health care provider.  Contact a health care provider if:  · Your pain does not go away within 1-4 weeks.  · You have significant appetite or weight loss.  Get help right away if:  · Your pain is severe.  · You notice weakness in your arms, hands, or legs.  · You begin to lose control of your bladder or bowel movements.  · You have fevers or night sweats.  This information is not intended to replace advice given to you by your health care provider. Make sure you discuss any questions you have with your health care provider.  Document Released: 10/14/2008 Document Revised: 05/25/2017 Document Reviewed: 04/21/2015  © 2017 [employer]

## 2019-09-22 ASSESSMENT — ENCOUNTER SYMPTOMS
DIARRHEA: 0
ABDOMINAL PAIN: 0
VOMITING: 0
COUGH: 0
SHORTNESS OF BREATH: 0
BLOOD IN STOOL: 0
NAUSEA: 0

## 2019-10-02 ENCOUNTER — HOSPITAL ENCOUNTER (OUTPATIENT)
Dept: LAB | Facility: MEDICAL CENTER | Age: 84
End: 2019-10-02
Attending: INTERNAL MEDICINE
Payer: MEDICARE

## 2019-10-02 LAB
ALBUMIN SERPL BCP-MCNC: 4.2 G/DL (ref 3.2–4.9)
ALBUMIN/GLOB SERPL: 1.2 G/DL
ALP SERPL-CCNC: 58 U/L (ref 30–99)
ALT SERPL-CCNC: 11 U/L (ref 2–50)
ANION GAP SERPL CALC-SCNC: 8 MMOL/L (ref 0–11.9)
APPEARANCE UR: CLEAR
AST SERPL-CCNC: 20 U/L (ref 12–45)
BACTERIA #/AREA URNS HPF: NEGATIVE /HPF
BASOPHILS # BLD AUTO: 0.8 % (ref 0–1.8)
BASOPHILS # BLD: 0.06 K/UL (ref 0–0.12)
BILIRUB SERPL-MCNC: 0.3 MG/DL (ref 0.1–1.5)
BILIRUB UR QL STRIP.AUTO: NEGATIVE
BUN SERPL-MCNC: 23 MG/DL (ref 8–22)
CALCIUM SERPL-MCNC: 9.9 MG/DL (ref 8.5–10.5)
CHLORIDE SERPL-SCNC: 109 MMOL/L (ref 96–112)
CHOLEST SERPL-MCNC: 220 MG/DL (ref 100–199)
CO2 SERPL-SCNC: 24 MMOL/L (ref 20–33)
COLOR UR: YELLOW
CREAT SERPL-MCNC: 1.24 MG/DL (ref 0.5–1.4)
EOSINOPHIL # BLD AUTO: 0.11 K/UL (ref 0–0.51)
EOSINOPHIL NFR BLD: 1.5 % (ref 0–6.9)
EPI CELLS #/AREA URNS HPF: NEGATIVE /HPF
ERYTHROCYTE [DISTWIDTH] IN BLOOD BY AUTOMATED COUNT: 47.7 FL (ref 35.9–50)
GLOBULIN SER CALC-MCNC: 3.5 G/DL (ref 1.9–3.5)
GLUCOSE SERPL-MCNC: 91 MG/DL (ref 65–99)
GLUCOSE UR STRIP.AUTO-MCNC: NEGATIVE MG/DL
HCT VFR BLD AUTO: 35 % (ref 37–47)
HDLC SERPL-MCNC: 49 MG/DL
HGB BLD-MCNC: 10.9 G/DL (ref 12–16)
HYALINE CASTS #/AREA URNS LPF: ABNORMAL /LPF
IMM GRANULOCYTES # BLD AUTO: 0.02 K/UL (ref 0–0.11)
IMM GRANULOCYTES NFR BLD AUTO: 0.3 % (ref 0–0.9)
KETONES UR STRIP.AUTO-MCNC: NEGATIVE MG/DL
LDLC SERPL CALC-MCNC: 138 MG/DL
LEUKOCYTE ESTERASE UR QL STRIP.AUTO: ABNORMAL
LYMPHOCYTES # BLD AUTO: 3.06 K/UL (ref 1–4.8)
LYMPHOCYTES NFR BLD: 41.8 % (ref 22–41)
MCH RBC QN AUTO: 29.5 PG (ref 27–33)
MCHC RBC AUTO-ENTMCNC: 31.1 G/DL (ref 33.6–35)
MCV RBC AUTO: 94.6 FL (ref 81.4–97.8)
MICRO URNS: ABNORMAL
MONOCYTES # BLD AUTO: 0.56 K/UL (ref 0–0.85)
MONOCYTES NFR BLD AUTO: 7.7 % (ref 0–13.4)
NEUTROPHILS # BLD AUTO: 3.51 K/UL (ref 2–7.15)
NEUTROPHILS NFR BLD: 47.9 % (ref 44–72)
NITRITE UR QL STRIP.AUTO: NEGATIVE
NRBC # BLD AUTO: 0 K/UL
NRBC BLD-RTO: 0 /100 WBC
PH UR STRIP.AUTO: 6.5 [PH] (ref 5–8)
PLATELET # BLD AUTO: 372 K/UL (ref 164–446)
PMV BLD AUTO: 8.6 FL (ref 9–12.9)
POTASSIUM SERPL-SCNC: 4.5 MMOL/L (ref 3.6–5.5)
PROT SERPL-MCNC: 7.7 G/DL (ref 6–8.2)
PROT UR QL STRIP: NEGATIVE MG/DL
RBC # BLD AUTO: 3.7 M/UL (ref 4.2–5.4)
RBC # URNS HPF: ABNORMAL /HPF
RBC UR QL AUTO: NEGATIVE
SODIUM SERPL-SCNC: 141 MMOL/L (ref 135–145)
SP GR UR STRIP.AUTO: 1.01
TRIGL SERPL-MCNC: 166 MG/DL (ref 0–149)
UROBILINOGEN UR STRIP.AUTO-MCNC: 0.2 MG/DL
WBC # BLD AUTO: 7.3 K/UL (ref 4.8–10.8)
WBC #/AREA URNS HPF: ABNORMAL /HPF

## 2019-10-02 PROCEDURE — 83036 HEMOGLOBIN GLYCOSYLATED A1C: CPT

## 2019-10-02 PROCEDURE — 80053 COMPREHEN METABOLIC PANEL: CPT

## 2019-10-02 PROCEDURE — 85025 COMPLETE CBC W/AUTO DIFF WBC: CPT

## 2019-10-02 PROCEDURE — 80061 LIPID PANEL: CPT

## 2019-10-02 PROCEDURE — 81001 URINALYSIS AUTO W/SCOPE: CPT

## 2019-10-02 PROCEDURE — 36415 COLL VENOUS BLD VENIPUNCTURE: CPT

## 2019-10-03 LAB
EST. AVERAGE GLUCOSE BLD GHB EST-MCNC: 114 MG/DL
HBA1C MFR BLD: 5.6 % (ref 0–5.6)

## 2019-10-04 ENCOUNTER — TELEPHONE (OUTPATIENT)
Dept: URGENT CARE | Facility: PHYSICIAN GROUP | Age: 84
End: 2019-10-04

## 2019-11-20 ENCOUNTER — APPOINTMENT (OUTPATIENT)
Dept: RADIOLOGY | Facility: MEDICAL CENTER | Age: 84
End: 2019-11-20
Attending: EMERGENCY MEDICINE
Payer: MEDICARE

## 2019-11-20 ENCOUNTER — HOSPITAL ENCOUNTER (EMERGENCY)
Facility: MEDICAL CENTER | Age: 84
End: 2019-11-20
Attending: EMERGENCY MEDICINE
Payer: MEDICARE

## 2019-11-20 VITALS
RESPIRATION RATE: 20 BRPM | WEIGHT: 110.23 LBS | HEIGHT: 61 IN | DIASTOLIC BLOOD PRESSURE: 66 MMHG | BODY MASS INDEX: 20.81 KG/M2 | SYSTOLIC BLOOD PRESSURE: 113 MMHG | HEART RATE: 77 BPM | OXYGEN SATURATION: 98 % | TEMPERATURE: 97.5 F

## 2019-11-20 DIAGNOSIS — M54.2 NECK PAIN: ICD-10-CM

## 2019-11-20 LAB — EKG IMPRESSION: NORMAL

## 2019-11-20 PROCEDURE — 99283 EMERGENCY DEPT VISIT LOW MDM: CPT

## 2019-11-20 PROCEDURE — 93005 ELECTROCARDIOGRAM TRACING: CPT | Performed by: EMERGENCY MEDICINE

## 2019-11-20 PROCEDURE — 72125 CT NECK SPINE W/O DYE: CPT

## 2019-11-20 PROCEDURE — 70450 CT HEAD/BRAIN W/O DYE: CPT

## 2019-11-20 PROCEDURE — 700102 HCHG RX REV CODE 250 W/ 637 OVERRIDE(OP): Performed by: EMERGENCY MEDICINE

## 2019-11-20 PROCEDURE — A9270 NON-COVERED ITEM OR SERVICE: HCPCS | Performed by: EMERGENCY MEDICINE

## 2019-11-20 RX ORDER — IBUPROFEN 200 MG
400 TABLET ORAL ONCE
Status: COMPLETED | OUTPATIENT
Start: 2019-11-20 | End: 2019-11-20

## 2019-11-20 RX ADMIN — IBUPROFEN 400 MG: 200 TABLET, FILM COATED ORAL at 13:05

## 2019-11-20 ASSESSMENT — LIFESTYLE VARIABLES: DO YOU DRINK ALCOHOL: NO

## 2019-11-20 NOTE — ED NOTES
Assumed care at this time. Patient resting in bed with family at bedside. Call light in reach. Bed locked and in lowest position. Denies any needs at this time.

## 2019-11-20 NOTE — ED NOTES
Pt ambulated to room changed to gown and placed on monitor. granddaughter at bedside. Call light in reach.

## 2019-11-20 NOTE — DISCHARGE INSTRUCTIONS
Follow-up with primary care in 1 to 2 days for reevaluation and referral for additional imaging/MRI or specialty evaluation if symptoms persist.    Ibuprofen 2 to 400 mg every 6 hours as needed for discomfort.  Continue your pain pill previously prescribed as needed for breakthrough pain.    Slow stretching and range of motion exercises recommended.  Apply heat as needed for discomfort.    Return to the emergency department for persistent or worsening neck pain, headache, extremity weakness or paresthesias, chest pain, syncope, fever or other new concerns.

## 2019-11-20 NOTE — ED PROVIDER NOTES
"ED Provider Note    CHIEF COMPLAINT  Chief Complaint   Patient presents with   • Headache   • Neck Pain   • Body Aches       HPI  Julito Mckenzie is a 89 y.o. female who presents to the emergency department through triage for neck pain.  Patient states that she woke up 2 days ago with right-sided neck pain, persistent since that time, worse with range of motion.  Denies trauma, fall or injury.  Denies other back pain.  No extremity paresthesias, focal weakness.  Patient states that she has had headache, but states the neck pain is causing this headache intermittently, none at this time.  No chest pain, palpitations, shortness of breath or syncope.  No recent illness.  No fever chills.    Patient has taken her pre-prescribed pain medicine for this discomfort and states it helps tremendously.    REVIEW OF SYSTEMS  See HPI for further details. All other systems are negative.     PAST MEDICAL HISTORY   has a past medical history of Chronic migraine and Hypertension.    SOCIAL HISTORY  Social History     Tobacco Use   • Smoking status: Never Smoker   • Smokeless tobacco: Never Used   Substance and Sexual Activity   • Alcohol use: No   • Drug use: No   • Sexual activity: Not on file       SURGICAL HISTORY  patient denies any surgical history    CURRENT MEDICATIONS  Home Medications     Reviewed by Paola Walker R.N. (Registered Nurse) on 11/20/19 at 0900  Med List Status: Partial   Medication Last Dose Status   amLODIPine (NORVASC) 10 MG Tab  Active   lidocaine (LIDODERM) 5 % Patch  Active   losartan-hydrochlorothiazide (HYZAAR) 100-12.5 MG per tablet  Active                ALLERGIES  No Known Allergies    PHYSICAL EXAM  VITAL SIGNS: /75   Pulse 76   Temp 36.4 °C (97.5 °F) (Temporal)   Resp (!) 33   Ht 1.549 m (5' 1\")   Wt 50 kg (110 lb 3.7 oz)   SpO2 97%   BMI 20.83 kg/m²   Pulse ox interpretation: I interpret this pulse ox as normal.  Constitutional: Alert in no apparent distress.  HENT: " Normocephalic, atraumatic. Bilateral external ears normal, Nose normal. Moist mucous membranes.    Eyes: Pupils are equal and reactive, Conjunctiva normal.   Neck: No midline tenderness, no step-offs.  Paravertebral discomfort on the right that extends over the right trapezius muscle.  Left unremarkable.  Range of motion intact with discomfort with right and leftward rotation only.  No meningeal irritation.  Lymphatic: No lymphadenopathy noted.   Cardiovascular: Regular rate and rhythm, no murmurs. Distal pulses intact.   Thorax & Lungs: Normal breath sounds.  No wheezing/rales/ronchi. No increased work of breathing  Skin: Warm, Dry  Musculoskeletal: Good range of motion in all major pain x4.  Speech clear and cohesive.  5 out of 5 strength in 4 extremities with proximal distal muscle groups.  Sensation intact light touch.  Psychiatric: Affect normal, Judgment normal, Mood normal.       DIAGNOSTIC STUDIES / PROCEDURES  LABS  Results for orders placed or performed during the hospital encounter of 19   EKG   Result Value Ref Range    Report       [facility] Emergency Dept.    Test Date:  2019  Pt Name:    SHERRIE FALK           Department: ER  MRN:        [id number]                      Room:       [facility]  Gender:     Female                       Technician: [id number]  :        [id number]                   Requested By:JARED JIMENEZ  Order #:    [id number]                    Reading MD: JARED JIMENEZ, DO    Measurements  Intervals                                Axis  Rate:       75                           P:          -6  IA:         156                          QRS:        -19  QRSD:       90                           T:          34  QT:         412  QTc:        461    Interpretive Statements  SINUS RHYTHM  BORDERLINE LEFT AXIS DEVIATION  BORDERLINE LOW VOLTAGE IN FRONTAL LEADS  PROBABLE POSTERIOR INFARCT  Compared to ECG 2019 12:40:00  Myocardial infarct finding now  present  Ventricular premature complex(es) no longer present  Electronically Signed On 11- 13:05:14 PST by CLAUDIA JIMENEZ, DO         RADIOLOGY  CT-HEAD W/O   Final Result      1.  Cerebral atrophy.      2.  White matter lucencies most consistent with small vessel ischemic change versus demyelination or gliosis.      3.  Otherwise, Head CT without contrast with no acute findings. No evidence of acute cerebral infarction, hemorrhage or mass lesion.      CT-CSPINE WITHOUT PLUS RECONS   Final Result      1.  Moderate degenerative changes cervical spine, primarily involving C5-6.   2.  Degenerative grade 1 anterolisthesis at C3-4 and C4-5.   3.  No acute cervical spine fracture.          COURSE & MEDICAL DECISION MAKING  Nursing notes and vital signs were reviewed. (See chart for details)  The patients records were reviewed, history was obtained from the patient and her granddaughter.    Evaluation for neck pain appears to be musculoskeletal in origin, cannot exclude a torticollis.  No evidence for trauma.  Imaging reveals degenerative changes only.  Symptomatology is less consistent with radiculopathy but this has not been excluded either.  Neurologically intact and nonfocal.  Pain somewhat improved with ibuprofen.    Patient is stable for discharge at this time, anticipatory guidance provided, continue medication including pain medicine for breakthrough pain, add ibuprofen sparingly for discomfort, warm compress,, close follow-up is encouraged with primary care and for referral for additional imaging or specialty evaluation if symptoms persist, and strict ED return instructions have been detailed. Patient is agreeable to the disposition and plan.    Patient's blood pressure was elevated in the emergency department, and has been referred to primary care for close monitoring.    FINAL IMPRESSION  (M54.2) Neck pain      Electronically signed by: Claudia Jimenez, 11/20/2019 11:03 AM      This dictation was  created using voice recognition software. The accuracy of the dictation is limited to the abilities of the software. I expect there may be some errors of grammar and possibly content. The nursing notes were reviewed and certain aspects of this information were incorporated into this note.

## 2019-11-20 NOTE — ED TRIAGE NOTES
Pt amb to triage w/ family.  Chief Complaint   Patient presents with   • Headache   • Neck Pain   • Body Aches     Symptoms x3d. Reports neck pain is worse when turning head to side. Denies injury. Denies fevers.

## 2020-03-06 ENCOUNTER — APPOINTMENT (OUTPATIENT)
Dept: RADIOLOGY | Facility: MEDICAL CENTER | Age: 85
End: 2020-03-06
Attending: EMERGENCY MEDICINE
Payer: MEDICARE

## 2020-03-06 ENCOUNTER — HOSPITAL ENCOUNTER (OUTPATIENT)
Facility: MEDICAL CENTER | Age: 85
End: 2020-03-08
Attending: EMERGENCY MEDICINE | Admitting: HOSPITALIST
Payer: MEDICARE

## 2020-03-06 DIAGNOSIS — R51.9 ACUTE INTRACTABLE HEADACHE, UNSPECIFIED HEADACHE TYPE: ICD-10-CM

## 2020-03-06 DIAGNOSIS — E86.0 DEHYDRATION: ICD-10-CM

## 2020-03-06 PROBLEM — E87.6 HYPOKALEMIA: Status: ACTIVE | Noted: 2020-03-06

## 2020-03-06 PROBLEM — D64.9 NORMOCYTIC ANEMIA: Status: ACTIVE | Noted: 2020-03-06

## 2020-03-06 PROBLEM — E87.20 METABOLIC ACIDOSIS: Status: ACTIVE | Noted: 2020-03-06

## 2020-03-06 LAB
ALBUMIN SERPL BCP-MCNC: 3.9 G/DL (ref 3.2–4.9)
ALBUMIN/GLOB SERPL: 1.1 G/DL
ALP SERPL-CCNC: 49 U/L (ref 30–99)
ALT SERPL-CCNC: 8 U/L (ref 2–50)
ANION GAP SERPL CALC-SCNC: 14 MMOL/L (ref 0–11.9)
APPEARANCE UR: CLEAR
AST SERPL-CCNC: 19 U/L (ref 12–45)
BACTERIA #/AREA URNS HPF: NEGATIVE /HPF
BASOPHILS # BLD AUTO: 0.5 % (ref 0–1.8)
BASOPHILS # BLD: 0.03 K/UL (ref 0–0.12)
BILIRUB SERPL-MCNC: 0.5 MG/DL (ref 0.1–1.5)
BILIRUB UR QL STRIP.AUTO: NEGATIVE
BUN SERPL-MCNC: 24 MG/DL (ref 8–22)
CALCIUM SERPL-MCNC: 8.8 MG/DL (ref 8.5–10.5)
CHLORIDE SERPL-SCNC: 105 MMOL/L (ref 96–112)
CO2 SERPL-SCNC: 15 MMOL/L (ref 20–33)
COLOR UR: YELLOW
CREAT SERPL-MCNC: 1.22 MG/DL (ref 0.5–1.4)
EKG IMPRESSION: NORMAL
EOSINOPHIL # BLD AUTO: 0.02 K/UL (ref 0–0.51)
EOSINOPHIL NFR BLD: 0.3 % (ref 0–6.9)
EPI CELLS #/AREA URNS HPF: NEGATIVE /HPF
ERYTHROCYTE [DISTWIDTH] IN BLOOD BY AUTOMATED COUNT: 50.2 FL (ref 35.9–50)
ERYTHROCYTE [SEDIMENTATION RATE] IN BLOOD BY WESTERGREN METHOD: 24 MM/HOUR (ref 0–30)
FLUAV RNA SPEC QL NAA+PROBE: NEGATIVE
FLUBV RNA SPEC QL NAA+PROBE: NEGATIVE
GLOBULIN SER CALC-MCNC: 3.5 G/DL (ref 1.9–3.5)
GLUCOSE SERPL-MCNC: 143 MG/DL (ref 65–99)
GLUCOSE UR STRIP.AUTO-MCNC: NEGATIVE MG/DL
HCT VFR BLD AUTO: 32.9 % (ref 37–47)
HGB BLD-MCNC: 10.9 G/DL (ref 12–16)
HYALINE CASTS #/AREA URNS LPF: NORMAL /LPF
IMM GRANULOCYTES # BLD AUTO: 0.02 K/UL (ref 0–0.11)
IMM GRANULOCYTES NFR BLD AUTO: 0.3 % (ref 0–0.9)
KETONES UR STRIP.AUTO-MCNC: NEGATIVE MG/DL
LEUKOCYTE ESTERASE UR QL STRIP.AUTO: NEGATIVE
LYMPHOCYTES # BLD AUTO: 1.73 K/UL (ref 1–4.8)
LYMPHOCYTES NFR BLD: 28.6 % (ref 22–41)
MCH RBC QN AUTO: 29.8 PG (ref 27–33)
MCHC RBC AUTO-ENTMCNC: 33.1 G/DL (ref 33.6–35)
MCV RBC AUTO: 89.9 FL (ref 81.4–97.8)
MICRO URNS: ABNORMAL
MONOCYTES # BLD AUTO: 0.31 K/UL (ref 0–0.85)
MONOCYTES NFR BLD AUTO: 5.1 % (ref 0–13.4)
NEUTROPHILS # BLD AUTO: 3.93 K/UL (ref 2–7.15)
NEUTROPHILS NFR BLD: 65.2 % (ref 44–72)
NITRITE UR QL STRIP.AUTO: NEGATIVE
NRBC # BLD AUTO: 0 K/UL
NRBC BLD-RTO: 0 /100 WBC
PH UR STRIP.AUTO: 7 [PH] (ref 5–8)
PLATELET # BLD AUTO: 228 K/UL (ref 164–446)
PMV BLD AUTO: 8.9 FL (ref 9–12.9)
POTASSIUM SERPL-SCNC: 3.3 MMOL/L (ref 3.6–5.5)
PROT SERPL-MCNC: 7.4 G/DL (ref 6–8.2)
PROT UR QL STRIP: 100 MG/DL
RBC # BLD AUTO: 3.66 M/UL (ref 4.2–5.4)
RBC # URNS HPF: NORMAL /HPF
RBC UR QL AUTO: NEGATIVE
SODIUM SERPL-SCNC: 134 MMOL/L (ref 135–145)
SP GR UR STRIP.AUTO: 1.01
TROPONIN T SERPL-MCNC: 10 NG/L (ref 6–19)
UROBILINOGEN UR STRIP.AUTO-MCNC: 0.2 MG/DL
WBC # BLD AUTO: 6 K/UL (ref 4.8–10.8)
WBC #/AREA URNS HPF: NORMAL /HPF

## 2020-03-06 PROCEDURE — G0378 HOSPITAL OBSERVATION PER HR: HCPCS

## 2020-03-06 PROCEDURE — 700111 HCHG RX REV CODE 636 W/ 250 OVERRIDE (IP): Performed by: EMERGENCY MEDICINE

## 2020-03-06 PROCEDURE — 96376 TX/PRO/DX INJ SAME DRUG ADON: CPT

## 2020-03-06 PROCEDURE — 85652 RBC SED RATE AUTOMATED: CPT

## 2020-03-06 PROCEDURE — 80053 COMPREHEN METABOLIC PANEL: CPT

## 2020-03-06 PROCEDURE — 96374 THER/PROPH/DIAG INJ IV PUSH: CPT

## 2020-03-06 PROCEDURE — 81001 URINALYSIS AUTO W/SCOPE: CPT

## 2020-03-06 PROCEDURE — 96372 THER/PROPH/DIAG INJ SC/IM: CPT | Mod: XU

## 2020-03-06 PROCEDURE — 84484 ASSAY OF TROPONIN QUANT: CPT

## 2020-03-06 PROCEDURE — 700105 HCHG RX REV CODE 258: Performed by: HOSPITALIST

## 2020-03-06 PROCEDURE — 96375 TX/PRO/DX INJ NEW DRUG ADDON: CPT

## 2020-03-06 PROCEDURE — 70450 CT HEAD/BRAIN W/O DYE: CPT

## 2020-03-06 PROCEDURE — A9270 NON-COVERED ITEM OR SERVICE: HCPCS | Performed by: HOSPITALIST

## 2020-03-06 PROCEDURE — 51798 US URINE CAPACITY MEASURE: CPT

## 2020-03-06 PROCEDURE — 85025 COMPLETE CBC W/AUTO DIFF WBC: CPT

## 2020-03-06 PROCEDURE — 99219 PR INITIAL OBSERVATION CARE,LEVL II: CPT | Performed by: HOSPITALIST

## 2020-03-06 PROCEDURE — 87502 INFLUENZA DNA AMP PROBE: CPT

## 2020-03-06 PROCEDURE — 700105 HCHG RX REV CODE 258: Performed by: EMERGENCY MEDICINE

## 2020-03-06 PROCEDURE — 99285 EMERGENCY DEPT VISIT HI MDM: CPT

## 2020-03-06 PROCEDURE — 700111 HCHG RX REV CODE 636 W/ 250 OVERRIDE (IP): Performed by: HOSPITALIST

## 2020-03-06 PROCEDURE — 700102 HCHG RX REV CODE 250 W/ 637 OVERRIDE(OP): Performed by: HOSPITALIST

## 2020-03-06 PROCEDURE — 93005 ELECTROCARDIOGRAM TRACING: CPT | Performed by: EMERGENCY MEDICINE

## 2020-03-06 RX ORDER — LOSARTAN POTASSIUM AND HYDROCHLOROTHIAZIDE 12.5; 1 MG/1; MG/1
1 TABLET ORAL DAILY
Status: DISCONTINUED | OUTPATIENT
Start: 2020-03-06 | End: 2020-03-06

## 2020-03-06 RX ORDER — TRAZODONE HYDROCHLORIDE 50 MG/1
50 TABLET ORAL NIGHTLY
COMMUNITY
End: 2020-07-09

## 2020-03-06 RX ORDER — OXYCODONE HYDROCHLORIDE 5 MG/1
2.5 TABLET ORAL
Status: DISCONTINUED | OUTPATIENT
Start: 2020-03-06 | End: 2020-03-08 | Stop reason: HOSPADM

## 2020-03-06 RX ORDER — OXYBUTYNIN CHLORIDE 5 MG/1
5 TABLET ORAL
COMMUNITY
End: 2020-07-09

## 2020-03-06 RX ORDER — SODIUM CHLORIDE 9 MG/ML
500 INJECTION, SOLUTION INTRAVENOUS ONCE
Status: COMPLETED | OUTPATIENT
Start: 2020-03-06 | End: 2020-03-06

## 2020-03-06 RX ORDER — LOSARTAN POTASSIUM 50 MG/1
100 TABLET ORAL
Status: DISCONTINUED | OUTPATIENT
Start: 2020-03-06 | End: 2020-03-08 | Stop reason: HOSPADM

## 2020-03-06 RX ORDER — ACETAMINOPHEN 325 MG/1
650 TABLET ORAL EVERY 6 HOURS PRN
Status: DISCONTINUED | OUTPATIENT
Start: 2020-03-06 | End: 2020-03-08 | Stop reason: HOSPADM

## 2020-03-06 RX ORDER — BISACODYL 10 MG
10 SUPPOSITORY, RECTAL RECTAL
Status: DISCONTINUED | OUTPATIENT
Start: 2020-03-06 | End: 2020-03-08 | Stop reason: HOSPADM

## 2020-03-06 RX ORDER — AMLODIPINE BESYLATE 5 MG/1
10 TABLET ORAL DAILY
Status: DISCONTINUED | OUTPATIENT
Start: 2020-03-06 | End: 2020-03-08 | Stop reason: HOSPADM

## 2020-03-06 RX ORDER — MECLIZINE HYDROCHLORIDE 25 MG/1
12.5 TABLET ORAL 3 TIMES DAILY PRN
Status: DISCONTINUED | OUTPATIENT
Start: 2020-03-06 | End: 2020-03-08 | Stop reason: HOSPADM

## 2020-03-06 RX ORDER — ONDANSETRON 2 MG/ML
4 INJECTION INTRAMUSCULAR; INTRAVENOUS EVERY 4 HOURS PRN
Status: DISCONTINUED | OUTPATIENT
Start: 2020-03-06 | End: 2020-03-08 | Stop reason: HOSPADM

## 2020-03-06 RX ORDER — MORPHINE SULFATE 4 MG/ML
2 INJECTION, SOLUTION INTRAMUSCULAR; INTRAVENOUS
Status: DISCONTINUED | OUTPATIENT
Start: 2020-03-06 | End: 2020-03-08 | Stop reason: HOSPADM

## 2020-03-06 RX ORDER — ONDANSETRON 2 MG/ML
4 INJECTION INTRAMUSCULAR; INTRAVENOUS ONCE
Status: COMPLETED | OUTPATIENT
Start: 2020-03-06 | End: 2020-03-06

## 2020-03-06 RX ORDER — OXYCODONE HYDROCHLORIDE 5 MG/1
5 TABLET ORAL
Status: DISCONTINUED | OUTPATIENT
Start: 2020-03-06 | End: 2020-03-08 | Stop reason: HOSPADM

## 2020-03-06 RX ORDER — AMOXICILLIN 250 MG
2 CAPSULE ORAL 2 TIMES DAILY
Status: DISCONTINUED | OUTPATIENT
Start: 2020-03-06 | End: 2020-03-08 | Stop reason: HOSPADM

## 2020-03-06 RX ORDER — POLYETHYLENE GLYCOL 3350 17 G/17G
1 POWDER, FOR SOLUTION ORAL
Status: DISCONTINUED | OUTPATIENT
Start: 2020-03-06 | End: 2020-03-08 | Stop reason: HOSPADM

## 2020-03-06 RX ORDER — SODIUM CHLORIDE, SODIUM LACTATE, POTASSIUM CHLORIDE, CALCIUM CHLORIDE 600; 310; 30; 20 MG/100ML; MG/100ML; MG/100ML; MG/100ML
INJECTION, SOLUTION INTRAVENOUS CONTINUOUS
Status: DISCONTINUED | OUTPATIENT
Start: 2020-03-06 | End: 2020-03-06

## 2020-03-06 RX ORDER — IRBESARTAN AND HYDROCHLOROTHIAZIDE 300; 12.5 MG/1; MG/1
1 TABLET, FILM COATED ORAL DAILY
Status: ON HOLD | COMMUNITY
End: 2020-06-13

## 2020-03-06 RX ORDER — KETOROLAC TROMETHAMINE 30 MG/ML
10 INJECTION, SOLUTION INTRAMUSCULAR; INTRAVENOUS ONCE
Status: COMPLETED | OUTPATIENT
Start: 2020-03-06 | End: 2020-03-06

## 2020-03-06 RX ORDER — ONDANSETRON 4 MG/1
4 TABLET, ORALLY DISINTEGRATING ORAL EVERY 4 HOURS PRN
Status: DISCONTINUED | OUTPATIENT
Start: 2020-03-06 | End: 2020-03-08 | Stop reason: HOSPADM

## 2020-03-06 RX ORDER — HYDROCHLOROTHIAZIDE 25 MG/1
12.5 TABLET ORAL
Status: DISCONTINUED | OUTPATIENT
Start: 2020-03-06 | End: 2020-03-08 | Stop reason: HOSPADM

## 2020-03-06 RX ADMIN — FENTANYL CITRATE 50 MCG: 50 INJECTION, SOLUTION INTRAMUSCULAR; INTRAVENOUS at 06:58

## 2020-03-06 RX ADMIN — AMLODIPINE BESYLATE 10 MG: 5 TABLET ORAL at 10:14

## 2020-03-06 RX ADMIN — HYDROCHLOROTHIAZIDE 12.5 MG: 25 TABLET ORAL at 10:13

## 2020-03-06 RX ADMIN — ONDANSETRON 4 MG: 2 INJECTION INTRAMUSCULAR; INTRAVENOUS at 04:59

## 2020-03-06 RX ADMIN — OXYCODONE HYDROCHLORIDE 5 MG: 5 TABLET ORAL at 10:14

## 2020-03-06 RX ADMIN — LOSARTAN POTASSIUM 100 MG: 50 TABLET, FILM COATED ORAL at 10:14

## 2020-03-06 RX ADMIN — SODIUM BICARBONATE: 84 INJECTION, SOLUTION INTRAVENOUS at 11:00

## 2020-03-06 RX ADMIN — KETOROLAC TROMETHAMINE 9.99 MG: 30 INJECTION, SOLUTION INTRAMUSCULAR; INTRAVENOUS at 05:49

## 2020-03-06 RX ADMIN — MECLIZINE HYDROCHLORIDE 12.5 MG: 25 TABLET ORAL at 13:30

## 2020-03-06 RX ADMIN — SODIUM CHLORIDE 500 ML: 9 INJECTION, SOLUTION INTRAVENOUS at 05:45

## 2020-03-06 RX ADMIN — ENOXAPARIN SODIUM 30 MG: 30 INJECTION SUBCUTANEOUS at 10:13

## 2020-03-06 RX ADMIN — MORPHINE SULFATE 2 MG: 4 INJECTION INTRAVENOUS at 21:02

## 2020-03-06 RX ADMIN — FENTANYL CITRATE 50 MCG: 50 INJECTION, SOLUTION INTRAMUSCULAR; INTRAVENOUS at 04:59

## 2020-03-06 RX ADMIN — ONDANSETRON 4 MG: 4 TABLET, ORALLY DISINTEGRATING ORAL at 10:28

## 2020-03-06 RX ADMIN — POTASSIUM BICARBONATE 50 MEQ: 978 TABLET, EFFERVESCENT ORAL at 10:14

## 2020-03-06 RX ADMIN — OXYCODONE HYDROCHLORIDE 5 MG: 5 TABLET ORAL at 16:15

## 2020-03-06 RX ADMIN — ONDANSETRON 4 MG: 2 INJECTION INTRAMUSCULAR; INTRAVENOUS at 16:15

## 2020-03-06 RX ADMIN — ONDANSETRON 4 MG: 2 INJECTION INTRAMUSCULAR; INTRAVENOUS at 20:56

## 2020-03-06 RX ADMIN — MORPHINE SULFATE 2 MG: 4 INJECTION INTRAVENOUS at 17:34

## 2020-03-06 ASSESSMENT — COGNITIVE AND FUNCTIONAL STATUS - GENERAL
DRESSING REGULAR LOWER BODY CLOTHING: A LITTLE
DRESSING REGULAR UPPER BODY CLOTHING: A LITTLE
HELP NEEDED FOR BATHING: A LITTLE
WALKING IN HOSPITAL ROOM: A LOT
MOVING FROM LYING ON BACK TO SITTING ON SIDE OF FLAT BED: A LITTLE
MOVING TO AND FROM BED TO CHAIR: A LITTLE
MOBILITY SCORE: 16
SUGGESTED CMS G CODE MODIFIER DAILY ACTIVITY: CK
PERSONAL GROOMING: A LITTLE
SUGGESTED CMS G CODE MODIFIER MOBILITY: CK
EATING MEALS: A LITTLE
TOILETING: A LITTLE
CLIMB 3 TO 5 STEPS WITH RAILING: A LOT
DAILY ACTIVITIY SCORE: 18
STANDING UP FROM CHAIR USING ARMS: A LITTLE
TURNING FROM BACK TO SIDE WHILE IN FLAT BAD: A LITTLE

## 2020-03-06 ASSESSMENT — FIBROSIS 4 INDEX: FIB4 SCORE: 1.48

## 2020-03-06 ASSESSMENT — ENCOUNTER SYMPTOMS
EYES NEGATIVE: 1
CONSTIPATION: 0
PSYCHIATRIC NEGATIVE: 1
SEIZURES: 0
EYE PAIN: 0
CARDIOVASCULAR NEGATIVE: 1
BACK PAIN: 0
DOUBLE VISION: 0
WEAKNESS: 0
GASTROINTESTINAL NEGATIVE: 1
HEADACHES: 1
DIARRHEA: 0
SINUS PAIN: 0
TINGLING: 0
FOCAL WEAKNESS: 0
BRUISES/BLEEDS EASILY: 0
ORTHOPNEA: 0
ABDOMINAL PAIN: 0
PHOTOPHOBIA: 0
BLURRED VISION: 0
FEVER: 0
MYALGIAS: 1
RESPIRATORY NEGATIVE: 1
CHILLS: 1
SPEECH CHANGE: 0
NAUSEA: 1
TREMORS: 0
CONSTITUTIONAL NEGATIVE: 1
VOMITING: 1
MUSCULOSKELETAL NEGATIVE: 1
PALPITATIONS: 0
DIZZINESS: 1
SENSORY CHANGE: 0
COUGH: 0
NECK PAIN: 0

## 2020-03-06 ASSESSMENT — PATIENT HEALTH QUESTIONNAIRE - PHQ9
SUM OF ALL RESPONSES TO PHQ9 QUESTIONS 1 AND 2: 0
2. FEELING DOWN, DEPRESSED, IRRITABLE, OR HOPELESS: NOT AT ALL
1. LITTLE INTEREST OR PLEASURE IN DOING THINGS: NOT AT ALL

## 2020-03-06 ASSESSMENT — LIFESTYLE VARIABLES
DO YOU DRINK ALCOHOL: NO
DOES PATIENT WANT TO STOP DRINKING: NO

## 2020-03-06 NOTE — ASSESSMENT & PLAN NOTE
Suspect chronic given no current evidence of bleed. Transfuse if needed for hemoglobin less than 7 gm/dL

## 2020-03-06 NOTE — CARE PLAN
Problem: Safety  Goal: Will remain free from injury  Outcome: PROGRESSING AS EXPECTED  Note: Bed alarm on, bed in lowest position, clutter free environment     Problem: Knowledge Deficit  Goal: Knowledge of disease process/condition, treatment plan, diagnostic tests, and medications will improve  Outcome: PROGRESSING AS EXPECTED  Note: Educated on medications, verbalizes understanding

## 2020-03-06 NOTE — ED NOTES
Pharmacy Medication Reconciliation      Medication reconciliation updated and complete per pt home pharmacy  Allergies have been verified   No oral ABX within the last 14 days  Pt home pharmacy:[facility]

## 2020-03-06 NOTE — ED NOTES
Patient states that headache is not relieved, requests more pain medication, ERP notified, verbal order obtained and administered, see MAR

## 2020-03-06 NOTE — ED PROVIDER NOTES
ED Provider Note    CHIEF COMPLAINT  Chief Complaint   Patient presents with   • Migraine     since 1900   • N/V     since 0200   • Dizziness     since 1900       HPI  Julito Mckenzie is a 89 y.o. female who presents with headache. Headache started at about 6 or 7 PM.  Gradually worsening.. It is located across the front of her head. Gradual onset. Not exertional. Headache is severe.  It does not radiate. Constant without modifying factors. Has not had associated fever, chills or neck stiffness. No double vision or vision changes. Denies trauma.  Denies focal weakness or numbness.  Has had nausea and vomiting.  Feels dizzy and like she cannot walk because of the severity of the headache.  Patient has a history of headaches similar to this about twice a year.    REVIEW OF SYSTEMS  Review of Systems   Constitutional: Positive for chills. Negative for fever.   HENT: Negative for congestion, ear pain, sinus pain and tinnitus.    Eyes: Negative for blurred vision, double vision, photophobia and pain.   Respiratory: Negative for cough.    Cardiovascular: Negative for chest pain, palpitations and orthopnea.   Gastrointestinal: Positive for nausea and vomiting. Negative for abdominal pain, constipation and diarrhea.   Genitourinary: Negative for dysuria and urgency.   Musculoskeletal: Positive for myalgias. Negative for back pain and neck pain.   Skin: Positive for rash.        Skin lesion left clavicle that is irritating when cloths touch   Neurological: Positive for dizziness and headaches. Negative for tingling, tremors, sensory change, speech change, focal weakness, seizures and weakness.   Endo/Heme/Allergies: Does not bruise/bleed easily.   All other systems reviewed and are negative.        PAST MEDICAL HISTORY  Past Medical History:   Diagnosis Date   • Chronic migraine    • Hypertension        FAMILY HISTORY  History reviewed. No pertinent family history.    SOCIAL HISTORY  Social History     Tobacco Use   •  "Smoking status: Never Smoker   • Smokeless tobacco: Never Used   Substance Use Topics   • Alcohol use: No   • Drug use: No       SURGICAL HISTORY  History reviewed. No pertinent surgical history.    CURRENT MEDICATIONS  Home Medications    **Home medications have not yet been reviewed for this encounter**         ALLERGIES  No Known Allergies    PHYSICAL EXAM  VITAL SIGNS: /106   Pulse 90   Temp 36.6 °C (97.8 °F) (Oral)   Ht 1.499 m (4' 11\")   Wt 47.6 kg (105 lb)   SpO2 97%   BMI 21.21 kg/m²   Constitutional: Awake and alert elderly female .  Appears uncomfortable hyperventilating.  Periodically gagging  HENT: Head atraumatic, TMs are clear, pharynx normal  Eyes: PERRL, EOMI  Neck: Normal range of motion, No tenderness, Supple, No stridor.   Lymphatic: No lymphadenopathy noted.   Cardiovascular: Normal heart rate, Normal rhythm  Thorax & Lungs: Normal breath sounds, No respiratory distress, No wheezing, No chest tenderness.   Skin: Warm, Dry, No erythema, No rash.   Extremities: Intact distal pulses, No edema, No tenderness, No cyanosis, No clubbing.   Neurologic: Alert & oriented x 3, Normal motor function, Normal sensory function, No focal deficits noted    RADIOLOGY/PROCEDURES  CT-HEAD W/O   Final Result      1.  Diffuse atrophy and white matter changes.   2.  No acute intracranial hemorrhage or territorial infarct.             Imaging is interpreted by radiologist     Labs  Results for orders placed or performed during the hospital encounter of 03/06/20   CBC WITH DIFFERENTIAL   Result Value Ref Range    WBC 6.0 4.8 - 10.8 K/uL    RBC 3.66 (L) 4.20 - 5.40 M/uL    Hemoglobin 10.9 (L) 12.0 - 16.0 g/dL    Hematocrit 32.9 (L) 37.0 - 47.0 %    MCV 89.9 81.4 - 97.8 fL    MCH 29.8 27.0 - 33.0 pg    MCHC 33.1 (L) 33.6 - 35.0 g/dL    RDW 50.2 (H) 35.9 - 50.0 fL    Platelet Count 228 164 - 446 K/uL    MPV 8.9 (L) 9.0 - 12.9 fL    Neutrophils-Polys 65.20 44.00 - 72.00 %    Lymphocytes 28.60 22.00 - 41.00 %    " Monocytes 5.10 0.00 - 13.40 %    Eosinophils 0.30 0.00 - 6.90 %    Basophils 0.50 0.00 - 1.80 %    Immature Granulocytes 0.30 0.00 - 0.90 %    Nucleated RBC 0.00 /100 WBC    Neutrophils (Absolute) 3.93 2.00 - 7.15 K/uL    Lymphs (Absolute) 1.73 1.00 - 4.80 K/uL    Monos (Absolute) 0.31 0.00 - 0.85 K/uL    Eos (Absolute) 0.02 0.00 - 0.51 K/uL    Baso (Absolute) 0.03 0.00 - 0.12 K/uL    Immature Granulocytes (abs) 0.02 0.00 - 0.11 K/uL    NRBC (Absolute) 0.00 K/uL   COMP METABOLIC PANEL   Result Value Ref Range    Sodium 134 (L) 135 - 145 mmol/L    Potassium 3.3 (L) 3.6 - 5.5 mmol/L    Chloride 105 96 - 112 mmol/L    Co2 15 (L) 20 - 33 mmol/L    Anion Gap 14.0 (H) 0.0 - 11.9    Glucose 143 (H) 65 - 99 mg/dL    Bun 24 (H) 8 - 22 mg/dL    Creatinine 1.22 0.50 - 1.40 mg/dL    Calcium 8.8 8.5 - 10.5 mg/dL    AST(SGOT) 19 12 - 45 U/L    ALT(SGPT) 8 2 - 50 U/L    Alkaline Phosphatase 49 30 - 99 U/L    Total Bilirubin 0.5 0.1 - 1.5 mg/dL    Albumin 3.9 3.2 - 4.9 g/dL    Total Protein 7.4 6.0 - 8.2 g/dL    Globulin 3.5 1.9 - 3.5 g/dL    A-G Ratio 1.1 g/dL   TROPONIN   Result Value Ref Range    Troponin T 10 6 - 19 ng/L   URINALYSIS CULTURE, IF INDICATED   Result Value Ref Range    Color Yellow     Character Clear     Specific Gravity 1.014 <1.035    Ph 7.0 5.0 - 8.0    Glucose Negative Negative mg/dL    Ketones Negative Negative mg/dL    Protein 100 (A) Negative mg/dL    Bilirubin Negative Negative    Urobilinogen, Urine 0.2 Negative    Nitrite Negative Negative    Leukocyte Esterase Negative Negative    Occult Blood Negative Negative    Micro Urine Req Microscopic    ESTIMATED GFR   Result Value Ref Range    GFR If African American 50 (A) >60 mL/min/1.73 m 2    GFR If Non  41 (A) >60 mL/min/1.73 m 2   Influenza A/B By PCR (Adult - Flu Only)   Result Value Ref Range    Influenza virus A RNA Negative Negative    Influenza virus B, PCR Negative Negative   URINE MICROSCOPIC (W/UA)   Result Value Ref Range    WBC  0-2 /hpf    RBC 0-2 /hpf    Bacteria Negative None /hpf    Epithelial Cells Negative /hpf    Hyaline Cast 0-2 /lpf   EKG (Now)   Result Value Ref Range    Report       [facility] Emergency Dept.    Test Date:  2020  Pt Name:    SHERRIE FALK           Department: ER  MRN:        [id number]                      Room:        11  Gender:     Female                       Technician: [id number]  :        [id number]                   Requested By:JON MUHAMMAD  Order #:    [id number]                    Reading MD: JON MUHAMMAD MD    Measurements  Intervals                                Axis  Rate:       81                           P:          0  MI:         200                          QRS:        -14  QRSD:       100                          T:          40  QT:         412  QTc:        479    Interpretive Statements  Sinus rhythm  Consider right atrial enlargement  Significant baseline wander limiting interpretation  Compared to ECG 2019 10:39:40  Electronically Signed On 3-6-2020 5:36:19 PST by JON MUHAMMAD MD          COURSE & MEDICAL DECISION MAKING  Patient presents with headache.  This is not entirely atypical for her, but she appears quite uncomfortable.  Not a common presentation of severe headache in the elderly.  Obtained CT scan of the head without evidence of hemorrhage.  Laboratory data without leukocytosis or left shift.  No clinical suggestion of meningitis.  No focal neurologic symptoms.  No temporal headache however I have ordered a sed rate which is pending.  No infectious symptoms otherwise.  No eye pain or complaints.  No visual changes.  Soft globes without suggestion of acute glaucoma.  Patient was treated symptomatically.  She has persistent intermittent headache although is improved.  She will be admitted to the hospital for further work-up.  Consulted Dr. Fung.    FINAL IMPRESSION  1.  Headache    This dictation was created using voice recognition  software. The accuracy of the dictation is limited to the abilities of the software. I expect there may be some errors of grammar and possibly content. The nursing notes were reviewed and certain aspects of this information were incorporated into this note.      Electronically signed by: Yinka Bhatia M.D., 3/6/2020 4:42 AM

## 2020-03-06 NOTE — H&P
Hospital Medicine History & Physical Note    Date of Service  3/6/2020    Primary Care Physician  Mike Guajardo M.D.    Consultants  None    Code Status  Full code    Chief Complaint  Headache    History of Presenting Illness  89 y.o. female with history of essential hypertension which is currently controlled, migraine disorder, with no intractable migraine, was apparently in her usual state of health until around 4 PM on the day prior to admission.  She was seen by her family members to become suddenly tired, and decided to lay down.  She reports having the onset of vertigo or lightheadedness.  This was associated with a headache which was all over, and not associated with any typical area of her head.  She reports the addition of nausea and vomiting associated with this.  She denies vision changes, chest pain or shortness of breath, abdominal pain, diarrhea or constipation.  When her symptoms did not improve she presented to the emergency department for further evaluation.    Review of Systems  Review of Systems   Constitutional: Negative.    HENT: Negative.    Eyes: Negative.    Respiratory: Negative.    Cardiovascular: Negative.    Gastrointestinal: Negative.    Genitourinary: Negative.    Musculoskeletal: Negative.    Skin: Negative.    Neurological: Positive for dizziness and headaches.   Endo/Heme/Allergies: Negative.    Psychiatric/Behavioral: Negative.        Past Medical History   has a past medical history of Chronic migraine and Hypertension.    Surgical History   has no past surgical history on file.     Family History  Reviewed and nonpertinent    Social History   reports that she has never smoked. She has never used smokeless tobacco. She reports that she does not drink alcohol or use drugs.    Allergies  No Known Allergies    Medications  Prior to Admission Medications   Prescriptions Last Dose Informant Patient Reported? Taking?   amLODIPine (NORVASC) 10 MG Tab  Family Member Yes No   Sig: Take  10 mg by mouth every day.   lidocaine (LIDODERM) 5 % Patch   No No   Sig: Apply 1 Patch to skin as directed every 24 hours.   losartan-hydrochlorothiazide (HYZAAR) 100-12.5 MG per tablet  Family Member Yes No   Sig: Take 1 Tab by mouth every day.      Facility-Administered Medications: None       Physical Exam  Temp:  [36.6 °C (97.8 °F)] 36.6 °C (97.8 °F)  Pulse:  [69-90] 69  Resp:  [22-28] 22  BP: (134-143)/() 134/74  SpO2:  [96 %-99 %] 96 %    Physical Exam  Vitals signs and nursing note reviewed.   Constitutional:       Appearance: Normal appearance. She is normal weight.   HENT:      Head: Normocephalic and atraumatic.      Nose: Nose normal.      Mouth/Throat:      Mouth: Mucous membranes are moist.      Pharynx: Oropharynx is clear.   Eyes:      Extraocular Movements: Extraocular movements intact.      Conjunctiva/sclera: Conjunctivae normal.      Pupils: Pupils are equal, round, and reactive to light.   Neck:      Musculoskeletal: Normal range of motion and neck supple.   Cardiovascular:      Rate and Rhythm: Normal rate and regular rhythm.      Pulses: Normal pulses.      Heart sounds: Normal heart sounds. No murmur. No friction rub. No gallop.    Pulmonary:      Effort: Pulmonary effort is normal. No respiratory distress.      Breath sounds: Normal breath sounds. No stridor. No wheezing.   Abdominal:      General: Abdomen is flat. Bowel sounds are normal. There is no distension.      Palpations: Abdomen is soft. There is no mass.      Tenderness: There is no abdominal tenderness.   Musculoskeletal: Normal range of motion.         General: No swelling, tenderness or deformity.   Skin:     General: Skin is warm and dry.   Neurological:      General: No focal deficit present.      Mental Status: She is alert and oriented to person, place, and time.   Psychiatric:         Mood and Affect: Mood normal.         Behavior: Behavior normal.         Laboratory:  Recent Labs     03/06/20  0457   WBC 6.0   RBC  3.66*   HEMOGLOBIN 10.9*   HEMATOCRIT 32.9*   MCV 89.9   MCH 29.8   MCHC 33.1*   RDW 50.2*   PLATELETCT 228   MPV 8.9*     Recent Labs     03/06/20  0457   SODIUM 134*   POTASSIUM 3.3*   CHLORIDE 105   CO2 15*   GLUCOSE 143*   BUN 24*   CREATININE 1.22   CALCIUM 8.8     Recent Labs     03/06/20  0457   ALTSGPT 8   ASTSGOT 19   ALKPHOSPHAT 49   TBILIRUBIN 0.5   GLUCOSE 143*         No results for input(s): NTPROBNP in the last 72 hours.      Recent Labs     03/06/20  0457   TROPONINT 10       Urinalysis:    Recent Labs     03/06/20  0641   SPECGRAVITY 1.014   GLUCOSEUR Negative   KETONES Negative   NITRITE Negative   LEUKESTERAS Negative   WBCURINE 0-2   RBCURINE 0-2   BACTERIA Negative   EPITHELCELL Negative        Imaging:  CT-HEAD W/O   Final Result      1.  Diffuse atrophy and white matter changes.   2.  No acute intracranial hemorrhage or territorial infarct.         MR-BRAIN-W/O    (Results Pending)         Assessment/Plan:  I anticipate this patient is appropriate for observation status at this time.    * Headache- (present on admission)  Assessment & Plan  Intractable, with associated vertigo/dizziness.  Given sudden onset, some concern for ischemic event, although no other focal neurologic findings.  Could also be due to migraine or other type of headache.  Plan for MR brain, pain management and meclizine as needed.      Normocytic anemia  Assessment & Plan  Suspect chronic given no current evidence of bleed. Transfuse if needed for hemoglobin less than 7 gm/dL      Hypokalemia  Assessment & Plan  Plan for replacement.  Monitor.     Metabolic acidosis  Assessment & Plan  Unclear etiology, no evidence of ketosis.  Plan for hydration with bicarbonate, monitor.      Essential hypertension- (present on admission)  Assessment & Plan  Controlled with current medication regimen.  Monitor.         VTE prophylaxis: SCD, lovenox

## 2020-03-06 NOTE — ED TRIAGE NOTES
Chief Complaint   Patient presents with   • Migraine     since 1900   • N/V     since 0200   • Dizziness     since 1900     Pt to triage via wheelchair with family. Pt complaining  Of migraine and dizziness since 1900, N,V since 0200. Pt appears uncomfortable in triage. Pt's pain unrelieved by eccedrin.

## 2020-03-06 NOTE — PROGRESS NOTES
2 RN skin check with Lorene RAY  Dry flaky heels  No pink/ redness on sacrum, heels, or behind heels

## 2020-03-06 NOTE — ASSESSMENT & PLAN NOTE
Patient is a poor historian but states that she has a history of migraine.  Start patient on Toradol 2 doses scheduled  We will give IV Reglan 3 times scheduled  Attempt obtaining MRI  Obtain PT OT gloria

## 2020-03-07 ENCOUNTER — APPOINTMENT (OUTPATIENT)
Dept: RADIOLOGY | Facility: MEDICAL CENTER | Age: 85
End: 2020-03-07
Attending: HOSPITALIST
Payer: MEDICARE

## 2020-03-07 PROBLEM — E87.20 METABOLIC ACIDOSIS: Status: RESOLVED | Noted: 2020-03-06 | Resolved: 2020-03-07

## 2020-03-07 LAB
ANION GAP SERPL CALC-SCNC: 12 MMOL/L (ref 0–11.9)
BASOPHILS # BLD AUTO: 0.4 % (ref 0–1.8)
BASOPHILS # BLD: 0.03 K/UL (ref 0–0.12)
BUN SERPL-MCNC: 16 MG/DL (ref 8–22)
CALCIUM SERPL-MCNC: 9.3 MG/DL (ref 8.5–10.5)
CHLORIDE SERPL-SCNC: 95 MMOL/L (ref 96–112)
CO2 SERPL-SCNC: 25 MMOL/L (ref 20–33)
CREAT SERPL-MCNC: 1.16 MG/DL (ref 0.5–1.4)
EOSINOPHIL # BLD AUTO: 0 K/UL (ref 0–0.51)
EOSINOPHIL NFR BLD: 0 % (ref 0–6.9)
ERYTHROCYTE [DISTWIDTH] IN BLOOD BY AUTOMATED COUNT: 49.1 FL (ref 35.9–50)
GLUCOSE SERPL-MCNC: 125 MG/DL (ref 65–99)
HCT VFR BLD AUTO: 34.7 % (ref 37–47)
HGB BLD-MCNC: 11.8 G/DL (ref 12–16)
IMM GRANULOCYTES # BLD AUTO: 0.02 K/UL (ref 0–0.11)
IMM GRANULOCYTES NFR BLD AUTO: 0.3 % (ref 0–0.9)
LYMPHOCYTES # BLD AUTO: 1.82 K/UL (ref 1–4.8)
LYMPHOCYTES NFR BLD: 25 % (ref 22–41)
MCH RBC QN AUTO: 30.3 PG (ref 27–33)
MCHC RBC AUTO-ENTMCNC: 34 G/DL (ref 33.6–35)
MCV RBC AUTO: 89.2 FL (ref 81.4–97.8)
MONOCYTES # BLD AUTO: 0.39 K/UL (ref 0–0.85)
MONOCYTES NFR BLD AUTO: 5.3 % (ref 0–13.4)
NEUTROPHILS # BLD AUTO: 5.03 K/UL (ref 2–7.15)
NEUTROPHILS NFR BLD: 69 % (ref 44–72)
NRBC # BLD AUTO: 0 K/UL
NRBC BLD-RTO: 0 /100 WBC
PLATELET # BLD AUTO: 252 K/UL (ref 164–446)
PMV BLD AUTO: 8.4 FL (ref 9–12.9)
POTASSIUM SERPL-SCNC: 3.3 MMOL/L (ref 3.6–5.5)
RBC # BLD AUTO: 3.89 M/UL (ref 4.2–5.4)
SODIUM SERPL-SCNC: 132 MMOL/L (ref 135–145)
WBC # BLD AUTO: 7.3 K/UL (ref 4.8–10.8)

## 2020-03-07 PROCEDURE — 700102 HCHG RX REV CODE 250 W/ 637 OVERRIDE(OP): Performed by: HOSPITALIST

## 2020-03-07 PROCEDURE — 99225 PR SUBSEQUENT OBSERVATION CARE,LEVEL II: CPT | Performed by: FAMILY MEDICINE

## 2020-03-07 PROCEDURE — 85025 COMPLETE CBC W/AUTO DIFF WBC: CPT

## 2020-03-07 PROCEDURE — A9270 NON-COVERED ITEM OR SERVICE: HCPCS | Performed by: FAMILY MEDICINE

## 2020-03-07 PROCEDURE — 80048 BASIC METABOLIC PNL TOTAL CA: CPT

## 2020-03-07 PROCEDURE — 96376 TX/PRO/DX INJ SAME DRUG ADON: CPT

## 2020-03-07 PROCEDURE — 96375 TX/PRO/DX INJ NEW DRUG ADDON: CPT

## 2020-03-07 PROCEDURE — 96372 THER/PROPH/DIAG INJ SC/IM: CPT

## 2020-03-07 PROCEDURE — 700111 HCHG RX REV CODE 636 W/ 250 OVERRIDE (IP): Performed by: FAMILY MEDICINE

## 2020-03-07 PROCEDURE — G0378 HOSPITAL OBSERVATION PER HR: HCPCS

## 2020-03-07 PROCEDURE — 700105 HCHG RX REV CODE 258: Performed by: HOSPITALIST

## 2020-03-07 PROCEDURE — A9270 NON-COVERED ITEM OR SERVICE: HCPCS | Performed by: HOSPITALIST

## 2020-03-07 PROCEDURE — 700102 HCHG RX REV CODE 250 W/ 637 OVERRIDE(OP): Performed by: FAMILY MEDICINE

## 2020-03-07 PROCEDURE — 700111 HCHG RX REV CODE 636 W/ 250 OVERRIDE (IP): Performed by: HOSPITALIST

## 2020-03-07 PROCEDURE — 36415 COLL VENOUS BLD VENIPUNCTURE: CPT

## 2020-03-07 RX ORDER — KETOROLAC TROMETHAMINE 30 MG/ML
15 INJECTION, SOLUTION INTRAMUSCULAR; INTRAVENOUS EVERY 8 HOURS
Status: DISCONTINUED | OUTPATIENT
Start: 2020-03-07 | End: 2020-03-07

## 2020-03-07 RX ORDER — SODIUM CHLORIDE 9 MG/ML
INJECTION, SOLUTION INTRAVENOUS CONTINUOUS
Status: CANCELLED | OUTPATIENT
Start: 2020-03-07

## 2020-03-07 RX ORDER — SUMATRIPTAN 6 MG/.5ML
6 INJECTION, SOLUTION SUBCUTANEOUS ONCE
Status: COMPLETED | OUTPATIENT
Start: 2020-03-07 | End: 2020-03-07

## 2020-03-07 RX ORDER — POTASSIUM CHLORIDE 20 MEQ/1
40 TABLET, EXTENDED RELEASE ORAL DAILY
Status: COMPLETED | OUTPATIENT
Start: 2020-03-07 | End: 2020-03-07

## 2020-03-07 RX ORDER — METOCLOPRAMIDE HYDROCHLORIDE 5 MG/ML
5 INJECTION INTRAMUSCULAR; INTRAVENOUS EVERY 8 HOURS
Status: DISPENSED | OUTPATIENT
Start: 2020-03-07 | End: 2020-03-08

## 2020-03-07 RX ADMIN — ENOXAPARIN SODIUM 30 MG: 30 INJECTION SUBCUTANEOUS at 04:14

## 2020-03-07 RX ADMIN — OXYCODONE HYDROCHLORIDE 5 MG: 5 TABLET ORAL at 00:18

## 2020-03-07 RX ADMIN — ONDANSETRON 4 MG: 4 TABLET, ORALLY DISINTEGRATING ORAL at 10:48

## 2020-03-07 RX ADMIN — AMLODIPINE BESYLATE 10 MG: 5 TABLET ORAL at 04:13

## 2020-03-07 RX ADMIN — ONDANSETRON 4 MG: 2 INJECTION INTRAMUSCULAR; INTRAVENOUS at 05:40

## 2020-03-07 RX ADMIN — SUMATRIPTAN 6 MG: 6 INJECTION, SOLUTION SUBCUTANEOUS at 17:52

## 2020-03-07 RX ADMIN — SENNOSIDES AND DOCUSATE SODIUM 2 TABLET: 8.6; 5 TABLET ORAL at 15:28

## 2020-03-07 RX ADMIN — POTASSIUM CHLORIDE 40 MEQ: 1500 TABLET, EXTENDED RELEASE ORAL at 15:28

## 2020-03-07 RX ADMIN — OXYCODONE HYDROCHLORIDE 5 MG: 5 TABLET ORAL at 10:48

## 2020-03-07 RX ADMIN — HYDROCHLOROTHIAZIDE 12.5 MG: 25 TABLET ORAL at 04:14

## 2020-03-07 RX ADMIN — LOSARTAN POTASSIUM 100 MG: 50 TABLET, FILM COATED ORAL at 04:14

## 2020-03-07 RX ADMIN — MECLIZINE HYDROCHLORIDE 12.5 MG: 25 TABLET ORAL at 09:14

## 2020-03-07 RX ADMIN — ONDANSETRON 4 MG: 2 INJECTION INTRAMUSCULAR; INTRAVENOUS at 01:31

## 2020-03-07 RX ADMIN — OXYCODONE HYDROCHLORIDE 5 MG: 5 TABLET ORAL at 04:13

## 2020-03-07 RX ADMIN — MECLIZINE HYDROCHLORIDE 12.5 MG: 25 TABLET ORAL at 00:16

## 2020-03-07 RX ADMIN — KETOROLAC TROMETHAMINE 15 MG: 30 INJECTION, SOLUTION INTRAMUSCULAR at 13:23

## 2020-03-07 RX ADMIN — METOCLOPRAMIDE 5 MG: 5 INJECTION, SOLUTION INTRAMUSCULAR; INTRAVENOUS at 13:23

## 2020-03-07 RX ADMIN — SODIUM BICARBONATE: 84 INJECTION, SOLUTION INTRAVENOUS at 01:33

## 2020-03-07 ASSESSMENT — ENCOUNTER SYMPTOMS
HEMOPTYSIS: 0
FEVER: 0
PALPITATIONS: 0
EYE DISCHARGE: 0
FOCAL WEAKNESS: 0
DOUBLE VISION: 0
DIARRHEA: 0
HEADACHES: 1
TINGLING: 0
SEIZURES: 0
WEAKNESS: 0
CHILLS: 0
COUGH: 0
BLURRED VISION: 0
ORTHOPNEA: 0
PHOTOPHOBIA: 1
WEIGHT LOSS: 0
TREMORS: 0
EYE PAIN: 0
HEARTBURN: 0
CLAUDICATION: 0
ABDOMINAL PAIN: 0
DIZZINESS: 1

## 2020-03-07 ASSESSMENT — FIBROSIS 4 INDEX: FIB4 SCORE: 2.43

## 2020-03-07 NOTE — PROGRESS NOTES
Assumed care of patient at 1900. Asleep in bed. Upon assessment does not participate in assessment questions other than related to pain and nausea. Got up to use bedside commode, steady, nausea continued. Unable to complete full neuro as she will not participate. Needs addressed, call light in reach, reinforced plan of care, pain plan, and calling for help before she gets out of bed. Bed alarm on. Hourly rounding in place.

## 2020-03-07 NOTE — PROGRESS NOTES
[facility] Medicine Daily Progress Note    Date of Service  3/7/2020    Chief Complaint  89 y.o. female admitted 3/6/2020 with headache    Hospital Course   Patient complains of persistent headache in last 24 hours      Interval Problem Update  Patient continued to have some dizziness and headache.  Headache is worse with lights and noises.  Patient is a poor historian.  Patient is not able to elaborate her further symptoms.  Patient denies any nausea or vomiting    Consultants/Specialty  None    Code Status  Full code    Disposition  Depending on physical therapy    Review of Systems  Review of Systems   Constitutional: Negative for chills, fever and weight loss.   HENT: Negative for ear discharge, ear pain and nosebleeds.    Eyes: Positive for photophobia. Negative for blurred vision, double vision, pain and discharge.   Respiratory: Negative for cough and hemoptysis.    Cardiovascular: Negative for chest pain, palpitations, orthopnea and claudication.   Gastrointestinal: Negative for abdominal pain, diarrhea and heartburn.   Genitourinary: Negative for dysuria, frequency and urgency.   Skin: Negative for rash.   Neurological: Positive for dizziness and headaches. Negative for tingling, tremors, focal weakness, seizures and weakness.        Physical Exam  Temp:  [36.2 °C (97.2 °F)-37 °C (98.6 °F)] 36.4 °C (97.6 °F)  Pulse:  [71-88] 81  Resp:  [16-18] 16  BP: (122-151)/(69-86) 122/69  SpO2:  [94 %-99 %] 97 %    Physical Exam  Constitutional:       General: She is not in acute distress.     Appearance: Normal appearance. She is not ill-appearing, toxic-appearing or diaphoretic.   HENT:      Head: Normocephalic and atraumatic.      Nose: No congestion or rhinorrhea.      Mouth/Throat:      Mouth: Mucous membranes are moist.      Pharynx: No oropharyngeal exudate or posterior oropharyngeal erythema.   Eyes:      General:         Right eye: No discharge.         Left eye: No discharge.      Pupils: Pupils are equal,  round, and reactive to light.   Neck:      Musculoskeletal: Normal range of motion and neck supple. No neck rigidity.   Cardiovascular:      Rate and Rhythm: Normal rate and regular rhythm.      Pulses: Normal pulses.      Heart sounds: Normal heart sounds. No murmur. No gallop.    Pulmonary:      Effort: Pulmonary effort is normal. No respiratory distress.      Breath sounds: Normal breath sounds. No stridor. No wheezing or rhonchi.   Chest:      Chest wall: No tenderness.   Abdominal:      General: Abdomen is flat. There is no distension.      Palpations: There is no mass.      Tenderness: There is no abdominal tenderness.      Hernia: No hernia is present.   Musculoskeletal: Normal range of motion.         General: No swelling, tenderness, deformity or signs of injury.   Skin:     Coloration: Skin is not jaundiced or pale.      Findings: No bruising or erythema.   Neurological:      General: No focal deficit present.      Mental Status: She is alert and oriented to person, place, and time.      Cranial Nerves: No cranial nerve deficit.      Sensory: No sensory deficit.      Motor: No weakness.      Coordination: Coordination normal.   Psychiatric:         Mood and Affect: Mood normal.         Fluids    Intake/Output Summary (Last 24 hours) at 3/7/2020 1432  Last data filed at 3/6/2020 2330  Gross per 24 hour   Intake 100 ml   Output --   Net 100 ml       Laboratory  Recent Labs     03/06/20  0457 03/07/20  1104   WBC 6.0 7.3   RBC 3.66* 3.89*   HEMOGLOBIN 10.9* 11.8*   HEMATOCRIT 32.9* 34.7*   MCV 89.9 89.2   MCH 29.8 30.3   MCHC 33.1* 34.0   RDW 50.2* 49.1   PLATELETCT 228 252   MPV 8.9* 8.4*     Recent Labs     03/06/20  0457 03/07/20  1104   SODIUM 134* 132*   POTASSIUM 3.3* 3.3*   CHLORIDE 105 95*   CO2 15* 25   GLUCOSE 143* 125*   BUN 24* 16   CREATININE 1.22 1.16   CALCIUM 8.8 9.3                   Imaging  CT-HEAD W/O   Final Result      1.  Diffuse atrophy and white matter changes.   2.  No acute  intracranial hemorrhage or territorial infarct.         MR-BRAIN-W/O    (Results Pending)        Assessment/Plan  * Headache- (present on admission)  Assessment & Plan  Patient is a poor historian but states that she has a history of migraine.  Start patient on Toradol 2 doses scheduled  We will give IV Reglan 3 times scheduled  Attempt obtaining MRI  Obtain PT OT eval    Hypokalemia  Assessment & Plan  Replaced today.    Normocytic anemia  Assessment & Plan  Suspect chronic given no current evidence of bleed. Transfuse if needed for hemoglobin less than 7 gm/dL      Essential hypertension- (present on admission)  Assessment & Plan  Controlled with current medication regimen.  Monitor.          VTE prophylaxis: Lovenox

## 2020-03-07 NOTE — PROGRESS NOTES
Attending Hospitalist today is Dr. Mujica.  Please call this physician for orders, updates, or questions.

## 2020-03-07 NOTE — PROGRESS NOTES
Pt unable to stay still for MRI pt having dry heaves Pt back on floor resting in bed. Will continue to monitor.

## 2020-03-07 NOTE — PROGRESS NOTES
Nausea, vomiting, and severe headache continued overnight. Pt saying often this morning that she wants to just go home. Discussed that we can't help her if she goes home and she won't have these same medications we are giving her for her pain. Reinforcing as needed.

## 2020-03-07 NOTE — CARE PLAN
Problem: Safety  Goal: Will remain free from injury  Outcome: PROGRESSING AS EXPECTED  Goal: Will remain free from falls  Outcome: PROGRESSING AS EXPECTED     Problem: Respiratory:  Goal: Respiratory status will improve  Outcome: PROGRESSING AS EXPECTED     Problem: Skin Integrity  Goal: Risk for impaired skin integrity will decrease  Outcome: PROGRESSING AS EXPECTED     Problem: Communication  Goal: The ability to communicate needs accurately and effectively will improve  Outcome: PROGRESSING SLOWER THAN EXPECTED  Intervention: Gantt patient and significant other/support system to call light to alert staff of needs  Note: Pt not calling appropriately and refusing to answer assessment questions. Does express needs once in the room.      Problem: Fluid Volume:  Goal: Will maintain balanced intake and output  Outcome: PROGRESSING SLOWER THAN EXPECTED  Intervention: Monitor, educate, and encourage compliance with therapeutic intake of liquids  Note: Pt unable to tolerate any PO food intake at this time.      Problem: Pain Management  Goal: Pain level will decrease to patient's comfort goal  Outcome: PROGRESSING SLOWER THAN EXPECTED

## 2020-03-08 ENCOUNTER — APPOINTMENT (OUTPATIENT)
Dept: RADIOLOGY | Facility: MEDICAL CENTER | Age: 85
End: 2020-03-08
Attending: HOSPITALIST
Payer: MEDICARE

## 2020-03-08 VITALS
BODY MASS INDEX: 21.82 KG/M2 | RESPIRATION RATE: 17 BRPM | SYSTOLIC BLOOD PRESSURE: 101 MMHG | TEMPERATURE: 97.6 F | OXYGEN SATURATION: 97 % | HEIGHT: 59 IN | HEART RATE: 72 BPM | DIASTOLIC BLOOD PRESSURE: 71 MMHG | WEIGHT: 108.25 LBS

## 2020-03-08 PROBLEM — E87.6 HYPOKALEMIA: Status: RESOLVED | Noted: 2020-03-06 | Resolved: 2020-03-08

## 2020-03-08 PROCEDURE — 700102 HCHG RX REV CODE 250 W/ 637 OVERRIDE(OP): Performed by: HOSPITALIST

## 2020-03-08 PROCEDURE — 96376 TX/PRO/DX INJ SAME DRUG ADON: CPT

## 2020-03-08 PROCEDURE — 700111 HCHG RX REV CODE 636 W/ 250 OVERRIDE (IP): Performed by: FAMILY MEDICINE

## 2020-03-08 PROCEDURE — G0378 HOSPITAL OBSERVATION PER HR: HCPCS

## 2020-03-08 PROCEDURE — 99217 PR OBSERVATION CARE DISCHARGE: CPT | Performed by: FAMILY MEDICINE

## 2020-03-08 PROCEDURE — 96372 THER/PROPH/DIAG INJ SC/IM: CPT

## 2020-03-08 PROCEDURE — 70551 MRI BRAIN STEM W/O DYE: CPT

## 2020-03-08 PROCEDURE — 700111 HCHG RX REV CODE 636 W/ 250 OVERRIDE (IP): Performed by: HOSPITALIST

## 2020-03-08 PROCEDURE — A9270 NON-COVERED ITEM OR SERVICE: HCPCS | Performed by: HOSPITALIST

## 2020-03-08 RX ORDER — IBUPROFEN 400 MG/1
400 TABLET ORAL 3 TIMES DAILY PRN
Qty: 30 TAB | Refills: 0 | Status: SHIPPED | OUTPATIENT
Start: 2020-03-08 | End: 2020-03-18

## 2020-03-08 RX ORDER — METOCLOPRAMIDE 5 MG/1
5 TABLET ORAL 3 TIMES DAILY PRN
Qty: 30 TAB | Refills: 0 | Status: SHIPPED | OUTPATIENT
Start: 2020-03-08 | End: 2020-06-10

## 2020-03-08 RX ADMIN — SENNOSIDES AND DOCUSATE SODIUM 2 TABLET: 8.6; 5 TABLET ORAL at 05:38

## 2020-03-08 RX ADMIN — ENOXAPARIN SODIUM 30 MG: 30 INJECTION SUBCUTANEOUS at 05:38

## 2020-03-08 RX ADMIN — ACETAMINOPHEN 650 MG: 325 TABLET, FILM COATED ORAL at 12:11

## 2020-03-08 RX ADMIN — METOCLOPRAMIDE 5 MG: 5 INJECTION, SOLUTION INTRAMUSCULAR; INTRAVENOUS at 05:38

## 2020-03-08 NOTE — PROGRESS NOTES
Patient returned from MRI at 11:07 via transport. Patient is sitting in bed comfortably. Phone call made to son asking about discharge disposition, voicemail left, awaiting return call.      MRI of the brain without contrast within normal limits for age with moderate atrophy and advanced white matter changes.    Will notify Dr. Mujica once discharge disposition has been arranged.

## 2020-03-08 NOTE — DISCHARGE INSTRUCTIONS
Dehydration, Adult  Dehydration is when there is not enough fluid or water in your body. This happens when you lose more fluids than you take in. Dehydration can range from mild to very bad. It should be treated right away to keep it from getting very bad.  Symptoms of mild dehydration may include:  Thirst.  Dry lips.  Slightly dry mouth.  Dry, warm skin.  Dizziness.  Symptoms of moderate dehydration may include:  Very dry mouth.  Muscle cramps.  Dark pee (urine). Pee may be the color of tea.  Your body making less pee.  Your eyes making fewer tears.  Heartbeat that is uneven or faster than normal (palpitations).  Headache.  Light-headedness, especially when you stand up from sitting.  Fainting (syncope).  Symptoms of very bad dehydration may include:  Changes in skin, such as:  Cold and clammy skin.  Blotchy (mottled) or pale skin.  Skin that does not quickly return to normal after being lightly pinched and let go (poor skin turgor).  Changes in body fluids, such as:  Feeling very thirsty.  Your eyes making fewer tears.  Not sweating when body temperature is high, such as in hot weather.  Your body making very little pee.  Changes in vital signs, such as:  Weak pulse.  Pulse that is more than 100 beats a minute when you are sitting still.  Fast breathing.  Low blood pressure.  Other changes, such as:  Sunken eyes.  Cold hands and feet.  Confusion.  Lack of energy (lethargy).  Trouble waking up from sleep.  Short-term weight loss.  Unconsciousness.  Follow these instructions at home:  If told by your doctor, drink an ORS:  Make an ORS by using instructions on the package.  Start by drinking small amounts, about ½ cup (120 mL) every 5-10 minutes.  Slowly drink more until you have had the amount that your doctor said to have.  Drink enough clear fluid to keep your pee clear or pale yellow. If you were told to drink an ORS, finish the ORS first, then start slowly drinking clear fluids. Drink fluids such as:  Water. Do  not drink only water by itself. Doing that can make the salt (sodium) level in your body get too low (hyponatremia).  Ice chips.  Fruit juice that you have added water to (diluted).  Low-calorie sports drinks.  Avoid:  Alcohol.  Drinks that have a lot of sugar. These include high-calorie sports drinks, fruit juice that does not have water added, and soda.  Caffeine.  Foods that are greasy or have a lot of fat or sugar.  Take over-the-counter and prescription medicines only as told by your doctor.  Do not take salt tablets. Doing that can make the salt level in your body get too high (hypernatremia).  Eat foods that have minerals (electrolytes). Examples include bananas, oranges, potatoes, tomatoes, and spinach.  Keep all follow-up visits as told by your doctor. This is important.  Contact a doctor if:  You have belly (abdominal) pain that:  Gets worse.  Stays in one area (localizes).  You have a rash.  You have a stiff neck.  You get angry or annoyed more easily than normal (irritability).  You are more sleepy than normal.  You have a harder time waking up than normal.  You feel:  Weak.  Dizzy.  Very thirsty.  You have peed (urinated) only a small amount of very dark pee during 6-8 hours.  Get help right away if:  You have symptoms of very bad dehydration.  You cannot drink fluids without throwing up (vomiting).  Your symptoms get worse with treatment.  You have a fever.  You have a very bad headache.  You are throwing up or having watery poop (diarrhea) and it:  Gets worse.  Does not go away.  You have blood or something green (bile) in your throw-up.  You have blood in your poop (stool). This may cause poop to look black and tarry.  You have not peed in 6-8 hours.  You pass out (faint).  Your heart rate when you are sitting still is more than 100 beats a minute.  You have trouble breathing.  This information is not intended to replace advice given to you by your health care provider. Make sure you discuss any  questions you have with your health care provider.  Document Released: 10/14/2010 Document Revised: 07/07/2017 Document Reviewed: 02/10/2017  [employer] Interactive Patient Education © 2017 [employer]  Migraine Headache  A migraine headache is an intense, throbbing pain on one side or both sides of the head. Migraines may also cause other symptoms, such as nausea, vomiting, and sensitivity to light and noise.  What are the causes?  Doing or taking certain things may also trigger migraines, such as:  · Alcohol.  · Smoking.  · Medicines, such as:  ¨ Medicine used to treat chest pain (nitroglycerine).  ¨ Birth control pills.  ¨ Estrogen pills.  ¨ Certain blood pressure medicines.  · Aged cheeses, chocolate, or caffeine.  · Foods or drinks that contain nitrates, glutamate, aspartame, or tyramine.  · Physical activity.  Other things that may trigger a migraine include:  · Menstruation.  · Pregnancy.  · Hunger.  · Stress, lack of sleep, too much sleep, or fatigue.  · Weather changes.  What increases the risk?  The following factors may make you more likely to experience migraine headaches:  · Age. Risk increases with age.  · Family history of migraine headaches.  · Being .  · Depression and anxiety.  · Obesity.  · Being a woman.  · Having a hole in the heart (patent foramen ovale) or other heart problems.  What are the signs or symptoms?  The main symptom of this condition is pulsating or throbbing pain. Pain may:  · Happen in any area of the head, such as on one side or both sides.  · Interfere with daily activities.  · Get worse with physical activity.  · Get worse with exposure to bright lights or loud noises.  Other symptoms may include:  · Nausea.  · Vomiting.  · Dizziness.  · General sensitivity to bright lights, loud noises, or smells.  Before you get a migraine, you may get warning signs that a migraine is developing (aura). An aura may include:  · Seeing flashing lights or having blind spots.  · Seeing  bright spots, halos, or zigzag lines.  · Having tunnel vision or blurred vision.  · Having numbness or a tingling feeling.  · Having trouble talking.  · Having muscle weakness.  How is this diagnosed?  A migraine headache can be diagnosed based on:  · Your symptoms.  · A physical exam.  · Tests, such as CT scan or MRI of the head. These imaging tests can help rule out other causes of headaches.  · Taking fluid from the spine (lumbar puncture) and analyzing it (cerebrospinal fluid analysis, or CSF analysis).  How is this treated?  A migraine headache is usually treated with medicines that:  · Relieve pain.  · Relieve nausea.  · Prevent migraines from coming back.  Treatment may also include:  · Acupuncture.  · Lifestyle changes like avoiding foods that trigger migraines.  Follow these instructions at home:  Medicines  · Take over-the-counter and prescription medicines only as told by your health care provider.  · Do not drive or use heavy machinery while taking prescription pain medicine.  · To prevent or treat constipation while you are taking prescription pain medicine, your health care provider may recommend that you:  ¨ Drink enough fluid to keep your urine clear or pale yellow.  ¨ Take over-the-counter or prescription medicines.  ¨ Eat foods that are high in fiber, such as fresh fruits and vegetables, whole grains, and beans.  ¨ Limit foods that are high in fat and processed sugars, such as fried and sweet foods.  Lifestyle  · Avoid alcohol use.  · Do not use any products that contain nicotine or tobacco, such as cigarettes and e-cigarettes. If you need help quitting, ask your health care provider.  · Get at least 8 hours of sleep every night.  · Limit your stress.  General instructions  · Keep a journal to find out what may trigger your migraine headaches. For example, write down:  ¨ What you eat and drink.  ¨ How much sleep you get.  ¨ Any change to your diet or medicines.  · If you have a migraine:  ¨ Avoid  things that make your symptoms worse, such as bright lights.  ¨ It may help to lie down in a dark, quiet room.  ¨ Do not drive or use heavy machinery.  ¨ Ask your health care provider what activities are safe for you while you are experiencing symptoms.  · Keep all follow-up visits as told by your health care provider. This is important.  Contact a health care provider if:  · You develop symptoms that are different or more severe than your usual migraine symptoms.  Get help right away if:  · Your migraine becomes severe.  · You have a fever.  · You have a stiff neck.  · You have vision loss.  · Your muscles feel weak or like you cannot control them.  · You start to lose your balance often.  · You develop trouble walking.  · You faint.  This information is not intended to replace advice given to you by your health care provider. Make sure you discuss any questions you have with your health care provider.  Document Released: 12/18/2006 Document Revised: 07/07/2017 Document Reviewed: 06/05/2017  [employer] Interactive Patient Education © 2017 [employer]      Discharge Instructions    Discharged to home by car with relative. Discharged via wheelchair, hospital escort: Refused.  Special equipment needed: Not Applicable    Be sure to schedule a follow-up appointment with your primary care doctor or any specialists as instructed.     Discharge Plan:   Diet Plan: Discussed  Activity Level: Discussed  Confirmed Follow up Appointment: Patient to Call and Schedule Appointment  Confirmed Symptoms Management: Discussed  Medication Reconciliation Updated: Yes  Influenza Vaccine Indication: Not indicated: Previously immunized this influenza season and > 8 years of age    I understand that a diet low in cholesterol, fat, and sodium is recommended for good health. Unless I have been given specific instructions below for another diet, I accept this instruction as my diet prescription.   Other diet: general diet    Special  Instructions: Follow up with PCP in 1 week.    · Is patient discharged on Warfarin / Coumadin?   No     Depression / Suicide Risk    As you are discharged from this [facility] facility, it is important to learn how to keep safe from harming yourself.    Recognize the warning signs:  · Abrupt changes in personality, positive or negative- including increase in energy   · Giving away possessions  · Change in eating patterns- significant weight changes-  positive or negative  · Change in sleeping patterns- unable to sleep or sleeping all the time   · Unwillingness or inability to communicate  · Depression  · Unusual sadness, discouragement and loneliness  · Talk of wanting to die  · Neglect of personal appearance   · Rebelliousness- reckless behavior  · Withdrawal from people/activities they love  · Confusion- inability to concentrate     If you or a loved one observes any of these behaviors or has concerns about self-harm, here's what you can do:  · Talk about it- your feelings and reasons for harming yourself  · Remove any means that you might use to hurt yourself (examples: pills, rope, extension cords, firearm)  · Get professional help from the community (Mental Health, Substance Abuse, psychological counseling)  · Do not be alone:Call your Safe Contact- someone whom you trust who will be there for you.  · Call your local CRISIS HOTLINE 615-8023 or 785-492-3369  · Call your local Children's Mobile Crisis Response Team [place] (973) 355-5795 or www.Ansible  · Call the toll free National Suicide Prevention Hotlines   · National Suicide Prevention Lifeline 297-526-FFJN (3223)  · National Hope Line Network 800-SUICIDE (210-1197)

## 2020-03-08 NOTE — CARE PLAN
Problem: Communication  Goal: The ability to communicate needs accurately and effectively will improve  Outcome: PROGRESSING AS EXPECTED     Problem: Safety  Goal: Will remain free from injury  Outcome: PROGRESSING AS EXPECTED  Goal: Will remain free from falls  Outcome: PROGRESSING AS EXPECTED     Problem: Knowledge Deficit  Goal: Knowledge of disease process/condition, treatment plan, diagnostic tests, and medications will improve  Outcome: PROGRESSING AS EXPECTED     Problem: Respiratory:  Goal: Respiratory status will improve  Outcome: PROGRESSING AS EXPECTED     Problem: Skin Integrity  Goal: Risk for impaired skin integrity will decrease  Outcome: PROGRESSING AS EXPECTED     Problem: Pain Management  Goal: Pain level will decrease to patient's comfort goal  Outcome: PROGRESSING AS EXPECTED     Problem: Fluid Volume:  Goal: Will maintain balanced intake and output  Outcome: PROGRESSING SLOWER THAN EXPECTED

## 2020-03-08 NOTE — DISCHARGE SUMMARY
Discharge Summary    CHIEF COMPLAINT ON ADMISSION  Chief Complaint   Patient presents with   • Migraine     since 1900   • N/V     since 0200   • Dizziness     since 1900       Reason for Admission  Nausea, Vomitting     Admission Date  3/6/2020    CODE STATUS  Full Code    HPI & HOSPITAL COURSE  This is a 89 y.o. female here with headache and vertigo    Patient is a 89-year-old female with history of hypertension and history of chronic dizziness, chronic migraine disorder and history of multiple migraine attacks in the past was found to have intermittent headache, vertigo, lightheadedness.  Due to patient's age and risk factor of high blood pressure patient was admitted for stroke evaluation.  Patient underwent for MRI brain without contrast which is consistent for the normal age changes and no evidence of acute stroke and clinical presentation is consistent with migraine.  Patient was treated with 1 dose of Toradol IV and schedule III doses of Reglan and patient is back to baseline headache is resolved.  I recommended patient to have outpatient follow-up with primary care physician and discuss about the migraine treatment.  Patient will be discharged home today.  Patient's granddaughter who lives very nearby to the patient she is in agreement that home discharge is safe for patient.    Physical Exam   Constitutional: She is oriented to person, place, and time and well-developed, well-nourished, and in no distress. No distress.   HENT:   Head: Normocephalic and atraumatic.   Eyes: Pupils are equal, round, and reactive to light. Conjunctivae and EOM are normal. No scleral icterus.   Neck: Neck supple.   Cardiovascular: Normal rate, regular rhythm, normal heart sounds and intact distal pulses. Exam reveals no gallop.   No murmur heard.  Pulmonary/Chest: Effort normal and breath sounds normal. No respiratory distress. She has no wheezes.   Abdominal: Soft. Bowel sounds are normal. She exhibits no distension. There is  no abdominal tenderness.   Musculoskeletal: Normal range of motion.         General: No deformity or edema.   Neurological: She is alert and oriented to person, place, and time. No cranial nerve deficit. She exhibits normal muscle tone. Gait normal. Coordination normal. GCS score is 15.   Skin: She is not diaphoretic.   Psychiatric: Memory, affect and judgment normal.   Nursing note and vitals reviewed.      Therefore, she is discharged in good and stable condition to home with close outpatient follow-up.    The patient recovered much more quickly than anticipated on admission.    Discharge Date  3/8/2020    FOLLOW UP ITEMS POST DISCHARGE  F/u With PCP and discuss about migraine treatment    DISCHARGE DIAGNOSES  Principal Problem (Resolved):    Headache POA: Yes  Active Problems:    Migraine headache POA: Yes    Essential hypertension POA: Yes  Resolved Problems:    Hypokalemia POA: Unknown      FOLLOW UP  No future appointments.  Mike Guajardo M.D.  [address] [place] [id number]  [phone]    In 1 week        MEDICATIONS ON DISCHARGE     Medication List      START taking these medications      Instructions   ibuprofen 400 MG Tabs  Commonly known as:  MOTRIN   Take 1 Tab by mouth 3 times a day as needed for Headache for up to 10 days.  Dose:  400 mg     metoclopramide 5 MG tablet  Commonly known as:  REGLAN   Take 1 Tab by mouth 3 times a day as needed (For migraine, nausea and dizzness).  Dose:  5 mg        CONTINUE taking these medications      Instructions   amLODIPine 10 MG Tabs  Commonly known as:  NORVASC   Take 10 mg by mouth every day.  Dose:  10 mg     irbesartan-hydrochlorothiazide 300-12.5 MG per tablet  Commonly known as:  AVALIDE   Take 1 Tab by mouth every day.  Dose:  1 Tab     losartan-hydrochlorothiazide 100-12.5 MG per tablet  Commonly known as:  HYZAAR   Take 1 Tab by mouth every day.  Dose:  1 Tab     oxybutynin 5 MG Tabs  Commonly known as:  DITROPAN   Take 5 mg by mouth every  bedtime.  Dose:  5 mg     traZODone 50 MG Tabs  Commonly known as:  DESYREL   Take 50 mg by mouth every evening.  Dose:  50 mg            Allergies  No Known Allergies    DIET  Orders Placed This Encounter   Procedures   • Diet Order Regular     Standing Status:   Standing     Number of Occurrences:   1     Order Specific Question:   Diet:     Answer:   Regular [1]       ACTIVITY  As tolerated.  Weight bearing as tolerated    CONSULTATIONS  None    PROCEDURES  None     LABORATORY  Lab Results   Component Value Date    SODIUM 132 (L) 03/07/2020    POTASSIUM 3.3 (L) 03/07/2020    CHLORIDE 95 (L) 03/07/2020    CO2 25 03/07/2020    GLUCOSE 125 (H) 03/07/2020    BUN 16 03/07/2020    CREATININE 1.16 03/07/2020    CREATININE 1.2 02/10/2009        Lab Results   Component Value Date    WBC 7.3 03/07/2020    HEMOGLOBIN 11.8 (L) 03/07/2020    HEMATOCRIT 34.7 (L) 03/07/2020    PLATELETCT 252 03/07/2020        Total time of the discharge process exceeds 30 minutes.

## 2020-03-08 NOTE — PROGRESS NOTES
Pt remained pain, nausea and vertigo free all night. Oriented x4 and more willing to answer questions at this time as well.

## 2020-03-08 NOTE — PROGRESS NOTES
Received report for patient at 0700. Patient is asleep at this time, comfortable in bed with no signs of distress.

## 2020-03-08 NOTE — PROGRESS NOTES
Assumed care of patient at 1900. CNA in room helping patient with commode. More alert at this time, states no headache, no dry heaving. Discussed call light use, plan of care, and pain plan. Will continue to monitor nausea/headache closely.

## 2020-03-09 ENCOUNTER — PATIENT OUTREACH (OUTPATIENT)
Dept: HEALTH INFORMATION MANAGEMENT | Facility: OTHER | Age: 85
End: 2020-03-09

## 2020-04-07 ENCOUNTER — PATIENT OUTREACH (OUTPATIENT)
Dept: HEALTH INFORMATION MANAGEMENT | Facility: OTHER | Age: 85
End: 2020-04-07

## 2020-04-08 NOTE — PROGRESS NOTES
A 89-year-old female was an emergent admission to [facility] from 3/6/2020 to 3/8/2020 to treat Headache. The Patient Navigator did not visit the patient bedside. The patient was discharged Home.     The patient was ordered to start/continue to take the following medications: Metoclopramide Hydrochloride (Reglan) and Ibuprofen. The patient successfully filled all medications.     The patient was ordered to follow-up with PCP.   The patient had the following appointment:   3/16/2020 @ 10:00 Mike Guajardo, internal medicine - CONFIRMED AS KEPT. The patient has no future appointments scheduled.     The Patient Navigator followed the patient for a total of 30 days and patient was receptive to services. Patient Navigator assisted patient by coordinating transportation from [facility].

## 2020-06-10 ENCOUNTER — APPOINTMENT (OUTPATIENT)
Dept: RADIOLOGY | Facility: MEDICAL CENTER | Age: 85
DRG: 439 | End: 2020-06-10
Attending: EMERGENCY MEDICINE
Payer: MEDICARE

## 2020-06-10 ENCOUNTER — HOSPITAL ENCOUNTER (INPATIENT)
Facility: MEDICAL CENTER | Age: 85
LOS: 3 days | DRG: 439 | End: 2020-06-13
Attending: EMERGENCY MEDICINE | Admitting: INTERNAL MEDICINE
Payer: MEDICARE

## 2020-06-10 DIAGNOSIS — K85.90 ACUTE PANCREATITIS WITHOUT INFECTION OR NECROSIS, UNSPECIFIED PANCREATITIS TYPE: ICD-10-CM

## 2020-06-10 LAB
ALBUMIN SERPL BCP-MCNC: 4.2 G/DL (ref 3.2–4.9)
ALBUMIN/GLOB SERPL: 1.3 G/DL
ALP SERPL-CCNC: 70 U/L (ref 30–99)
ALT SERPL-CCNC: 12 U/L (ref 2–50)
ANION GAP SERPL CALC-SCNC: 16 MMOL/L (ref 7–16)
APPEARANCE UR: CLEAR
AST SERPL-CCNC: 25 U/L (ref 12–45)
BACTERIA #/AREA URNS HPF: NEGATIVE /HPF
BASOPHILS # BLD AUTO: 0.5 % (ref 0–1.8)
BASOPHILS # BLD: 0.05 K/UL (ref 0–0.12)
BILIRUB SERPL-MCNC: 0.3 MG/DL (ref 0.1–1.5)
BILIRUB UR QL STRIP.AUTO: NEGATIVE
BUN SERPL-MCNC: 21 MG/DL (ref 8–22)
CALCIUM SERPL-MCNC: 10.3 MG/DL (ref 8.5–10.5)
CHLORIDE SERPL-SCNC: 104 MMOL/L (ref 96–112)
CHOLEST SERPL-MCNC: 207 MG/DL (ref 100–199)
CO2 SERPL-SCNC: 18 MMOL/L (ref 20–33)
COLOR UR: YELLOW
CREAT SERPL-MCNC: 1.16 MG/DL (ref 0.5–1.4)
EOSINOPHIL # BLD AUTO: 0.06 K/UL (ref 0–0.51)
EOSINOPHIL NFR BLD: 0.6 % (ref 0–6.9)
EPI CELLS #/AREA URNS HPF: NEGATIVE /HPF
ERYTHROCYTE [DISTWIDTH] IN BLOOD BY AUTOMATED COUNT: 47 FL (ref 35.9–50)
GLOBULIN SER CALC-MCNC: 3.2 G/DL (ref 1.9–3.5)
GLUCOSE SERPL-MCNC: 101 MG/DL (ref 65–99)
GLUCOSE UR STRIP.AUTO-MCNC: NEGATIVE MG/DL
HCT VFR BLD AUTO: 36.3 % (ref 37–47)
HDLC SERPL-MCNC: 42 MG/DL
HGB BLD-MCNC: 11.9 G/DL (ref 12–16)
HYALINE CASTS #/AREA URNS LPF: ABNORMAL /LPF
IMM GRANULOCYTES # BLD AUTO: 0.03 K/UL (ref 0–0.11)
IMM GRANULOCYTES NFR BLD AUTO: 0.3 % (ref 0–0.9)
KETONES UR STRIP.AUTO-MCNC: ABNORMAL MG/DL
LACTATE BLD-SCNC: 1.5 MMOL/L (ref 0.5–2)
LDLC SERPL CALC-MCNC: 105 MG/DL
LEUKOCYTE ESTERASE UR QL STRIP.AUTO: ABNORMAL
LIPASE SERPL-CCNC: >3000 U/L (ref 11–82)
LYMPHOCYTES # BLD AUTO: 1.8 K/UL (ref 1–4.8)
LYMPHOCYTES NFR BLD: 17.6 % (ref 22–41)
MCH RBC QN AUTO: 31.1 PG (ref 27–33)
MCHC RBC AUTO-ENTMCNC: 32.8 G/DL (ref 33.6–35)
MCV RBC AUTO: 94.8 FL (ref 81.4–97.8)
MICRO URNS: ABNORMAL
MONOCYTES # BLD AUTO: 0.37 K/UL (ref 0–0.85)
MONOCYTES NFR BLD AUTO: 3.6 % (ref 0–13.4)
NEUTROPHILS # BLD AUTO: 7.94 K/UL (ref 2–7.15)
NEUTROPHILS NFR BLD: 77.4 % (ref 44–72)
NITRITE UR QL STRIP.AUTO: NEGATIVE
NRBC # BLD AUTO: 0 K/UL
NRBC BLD-RTO: 0 /100 WBC
PH UR STRIP.AUTO: 6.5 [PH] (ref 5–8)
PLATELET # BLD AUTO: 262 K/UL (ref 164–446)
PMV BLD AUTO: 8.8 FL (ref 9–12.9)
POTASSIUM SERPL-SCNC: 3.9 MMOL/L (ref 3.6–5.5)
PROT SERPL-MCNC: 7.4 G/DL (ref 6–8.2)
PROT UR QL STRIP: NEGATIVE MG/DL
RBC # BLD AUTO: 3.83 M/UL (ref 4.2–5.4)
RBC # URNS HPF: ABNORMAL /HPF
RBC UR QL AUTO: NEGATIVE
SODIUM SERPL-SCNC: 138 MMOL/L (ref 135–145)
SP GR UR STRIP.AUTO: 1.01
TRIGL SERPL-MCNC: 298 MG/DL (ref 0–149)
UROBILINOGEN UR STRIP.AUTO-MCNC: 0.2 MG/DL
WBC # BLD AUTO: 10.3 K/UL (ref 4.8–10.8)
WBC #/AREA URNS HPF: ABNORMAL /HPF

## 2020-06-10 PROCEDURE — 83605 ASSAY OF LACTIC ACID: CPT

## 2020-06-10 PROCEDURE — 700102 HCHG RX REV CODE 250 W/ 637 OVERRIDE(OP): Performed by: INTERNAL MEDICINE

## 2020-06-10 PROCEDURE — 96375 TX/PRO/DX INJ NEW DRUG ADDON: CPT

## 2020-06-10 PROCEDURE — 770006 HCHG ROOM/CARE - MED/SURG/GYN SEMI*

## 2020-06-10 PROCEDURE — 700105 HCHG RX REV CODE 258: Performed by: EMERGENCY MEDICINE

## 2020-06-10 PROCEDURE — 700105 HCHG RX REV CODE 258: Performed by: INTERNAL MEDICINE

## 2020-06-10 PROCEDURE — 85025 COMPLETE CBC W/AUTO DIFF WBC: CPT

## 2020-06-10 PROCEDURE — 700117 HCHG RX CONTRAST REV CODE 255: Performed by: EMERGENCY MEDICINE

## 2020-06-10 PROCEDURE — 80061 LIPID PANEL: CPT

## 2020-06-10 PROCEDURE — 74177 CT ABD & PELVIS W/CONTRAST: CPT

## 2020-06-10 PROCEDURE — 80053 COMPREHEN METABOLIC PANEL: CPT

## 2020-06-10 PROCEDURE — A9270 NON-COVERED ITEM OR SERVICE: HCPCS | Performed by: INTERNAL MEDICINE

## 2020-06-10 PROCEDURE — 99223 1ST HOSP IP/OBS HIGH 75: CPT | Mod: AI | Performed by: INTERNAL MEDICINE

## 2020-06-10 PROCEDURE — 81001 URINALYSIS AUTO W/SCOPE: CPT

## 2020-06-10 PROCEDURE — 83690 ASSAY OF LIPASE: CPT

## 2020-06-10 PROCEDURE — 99285 EMERGENCY DEPT VISIT HI MDM: CPT

## 2020-06-10 PROCEDURE — 96374 THER/PROPH/DIAG INJ IV PUSH: CPT

## 2020-06-10 PROCEDURE — 700111 HCHG RX REV CODE 636 W/ 250 OVERRIDE (IP): Performed by: EMERGENCY MEDICINE

## 2020-06-10 RX ORDER — MORPHINE SULFATE 4 MG/ML
2 INJECTION, SOLUTION INTRAMUSCULAR; INTRAVENOUS ONCE
Status: COMPLETED | OUTPATIENT
Start: 2020-06-10 | End: 2020-06-10

## 2020-06-10 RX ORDER — TRAZODONE HYDROCHLORIDE 100 MG/1
50 TABLET ORAL NIGHTLY
Status: DISCONTINUED | OUTPATIENT
Start: 2020-06-10 | End: 2020-06-13 | Stop reason: HOSPADM

## 2020-06-10 RX ORDER — MORPHINE SULFATE 4 MG/ML
4 INJECTION, SOLUTION INTRAMUSCULAR; INTRAVENOUS ONCE
Status: DISCONTINUED | OUTPATIENT
Start: 2020-06-10 | End: 2020-06-10

## 2020-06-10 RX ORDER — ENALAPRILAT 1.25 MG/ML
1.25 INJECTION INTRAVENOUS EVERY 6 HOURS PRN
Status: DISCONTINUED | OUTPATIENT
Start: 2020-06-10 | End: 2020-06-13 | Stop reason: HOSPADM

## 2020-06-10 RX ORDER — IRBESARTAN AND HYDROCHLOROTHIAZIDE 300; 12.5 MG/1; MG/1
1 TABLET, FILM COATED ORAL DAILY
Status: DISCONTINUED | OUTPATIENT
Start: 2020-06-11 | End: 2020-06-10

## 2020-06-10 RX ORDER — POLYETHYLENE GLYCOL 3350 17 G/17G
1 POWDER, FOR SOLUTION ORAL
Status: DISCONTINUED | OUTPATIENT
Start: 2020-06-10 | End: 2020-06-12

## 2020-06-10 RX ORDER — OXYCODONE HYDROCHLORIDE 10 MG/1
10 TABLET ORAL
Status: DISCONTINUED | OUTPATIENT
Start: 2020-06-10 | End: 2020-06-13 | Stop reason: HOSPADM

## 2020-06-10 RX ORDER — OXYCODONE HYDROCHLORIDE 5 MG/1
5 TABLET ORAL
Status: DISCONTINUED | OUTPATIENT
Start: 2020-06-10 | End: 2020-06-13 | Stop reason: HOSPADM

## 2020-06-10 RX ORDER — HYDROMORPHONE HYDROCHLORIDE 1 MG/ML
0.5 INJECTION, SOLUTION INTRAMUSCULAR; INTRAVENOUS; SUBCUTANEOUS
Status: DISCONTINUED | OUTPATIENT
Start: 2020-06-10 | End: 2020-06-13 | Stop reason: HOSPADM

## 2020-06-10 RX ORDER — BISACODYL 10 MG
10 SUPPOSITORY, RECTAL RECTAL
Status: DISCONTINUED | OUTPATIENT
Start: 2020-06-10 | End: 2020-06-12

## 2020-06-10 RX ORDER — SODIUM CHLORIDE, SODIUM LACTATE, POTASSIUM CHLORIDE, CALCIUM CHLORIDE 600; 310; 30; 20 MG/100ML; MG/100ML; MG/100ML; MG/100ML
INJECTION, SOLUTION INTRAVENOUS CONTINUOUS
Status: DISCONTINUED | OUTPATIENT
Start: 2020-06-10 | End: 2020-06-13 | Stop reason: HOSPADM

## 2020-06-10 RX ORDER — HYDROCHLOROTHIAZIDE 25 MG/1
12.5 TABLET ORAL
Status: DISCONTINUED | OUTPATIENT
Start: 2020-06-11 | End: 2020-06-11

## 2020-06-10 RX ORDER — ONDANSETRON 2 MG/ML
4 INJECTION INTRAMUSCULAR; INTRAVENOUS ONCE
Status: COMPLETED | OUTPATIENT
Start: 2020-06-10 | End: 2020-06-10

## 2020-06-10 RX ORDER — IRBESARTAN 150 MG/1
300 TABLET ORAL
Status: DISCONTINUED | OUTPATIENT
Start: 2020-06-11 | End: 2020-06-12

## 2020-06-10 RX ORDER — SODIUM CHLORIDE 9 MG/ML
1000 INJECTION, SOLUTION INTRAVENOUS ONCE
Status: COMPLETED | OUTPATIENT
Start: 2020-06-10 | End: 2020-06-10

## 2020-06-10 RX ORDER — OXYBUTYNIN CHLORIDE 5 MG/1
5 TABLET ORAL
Status: DISCONTINUED | OUTPATIENT
Start: 2020-06-10 | End: 2020-06-13 | Stop reason: HOSPADM

## 2020-06-10 RX ORDER — ONDANSETRON 2 MG/ML
4 INJECTION INTRAMUSCULAR; INTRAVENOUS EVERY 4 HOURS PRN
Status: DISCONTINUED | OUTPATIENT
Start: 2020-06-10 | End: 2020-06-13 | Stop reason: HOSPADM

## 2020-06-10 RX ORDER — AMLODIPINE BESYLATE 5 MG/1
10 TABLET ORAL DAILY
Status: DISCONTINUED | OUTPATIENT
Start: 2020-06-11 | End: 2020-06-12

## 2020-06-10 RX ORDER — ONDANSETRON 4 MG/1
4 TABLET, ORALLY DISINTEGRATING ORAL EVERY 4 HOURS PRN
Status: DISCONTINUED | OUTPATIENT
Start: 2020-06-10 | End: 2020-06-13 | Stop reason: HOSPADM

## 2020-06-10 RX ORDER — AMOXICILLIN 250 MG
2 CAPSULE ORAL 2 TIMES DAILY
Status: DISCONTINUED | OUTPATIENT
Start: 2020-06-10 | End: 2020-06-12

## 2020-06-10 RX ORDER — ACETAMINOPHEN 325 MG/1
650 TABLET ORAL EVERY 6 HOURS PRN
Status: DISCONTINUED | OUTPATIENT
Start: 2020-06-10 | End: 2020-06-13 | Stop reason: HOSPADM

## 2020-06-10 RX ADMIN — TRAZODONE HYDROCHLORIDE 50 MG: 100 TABLET ORAL at 22:09

## 2020-06-10 RX ADMIN — SODIUM CHLORIDE, POTASSIUM CHLORIDE, SODIUM LACTATE AND CALCIUM CHLORIDE: 600; 310; 30; 20 INJECTION, SOLUTION INTRAVENOUS at 22:04

## 2020-06-10 RX ADMIN — OXYBUTYNIN CHLORIDE 5 MG: 5 TABLET ORAL at 23:07

## 2020-06-10 RX ADMIN — ONDANSETRON HYDROCHLORIDE 4 MG: 2 SOLUTION INTRAMUSCULAR; INTRAVENOUS at 18:36

## 2020-06-10 RX ADMIN — SODIUM CHLORIDE 1000 ML: 9 INJECTION, SOLUTION INTRAVENOUS at 19:39

## 2020-06-10 RX ADMIN — IOHEXOL 70 ML: 350 INJECTION, SOLUTION INTRAVENOUS at 18:22

## 2020-06-10 RX ADMIN — MORPHINE SULFATE 2 MG: 4 INJECTION INTRAVENOUS at 18:37

## 2020-06-10 ASSESSMENT — ENCOUNTER SYMPTOMS
VOMITING: 0
PALPITATIONS: 0
DIZZINESS: 0
WEAKNESS: 0
SPUTUM PRODUCTION: 0
NAUSEA: 0
CONSTIPATION: 0
LOSS OF CONSCIOUSNESS: 0
TINGLING: 0
FEVER: 0
DIARRHEA: 0
MYALGIAS: 0
DEPRESSION: 0
COUGH: 0
FALLS: 0
CHILLS: 0
HEADACHES: 0
STRIDOR: 0
ABDOMINAL PAIN: 1
SHORTNESS OF BREATH: 0

## 2020-06-10 ASSESSMENT — LIFESTYLE VARIABLES
TOTAL SCORE: 0
HAVE PEOPLE ANNOYED YOU BY CRITICIZING YOUR DRINKING: NO
EVER_SMOKED: NEVER
TOTAL SCORE: 0
HAVE YOU EVER FELT YOU SHOULD CUT DOWN ON YOUR DRINKING: NO
ALCOHOL_USE: NO
EVER HAD A DRINK FIRST THING IN THE MORNING TO STEADY YOUR NERVES TO GET RID OF A HANGOVER: NO
TOTAL SCORE: 0
ON A TYPICAL DAY WHEN YOU DRINK ALCOHOL HOW MANY DRINKS DO YOU HAVE: 0
HOW MANY TIMES IN THE PAST YEAR HAVE YOU HAD 5 OR MORE DRINKS IN A DAY: 0
AVERAGE NUMBER OF DAYS PER WEEK YOU HAVE A DRINK CONTAINING ALCOHOL: 0
CONSUMPTION TOTAL: NEGATIVE
EVER FELT BAD OR GUILTY ABOUT YOUR DRINKING: NO

## 2020-06-10 ASSESSMENT — COGNITIVE AND FUNCTIONAL STATUS - GENERAL
PERSONAL GROOMING: A LITTLE
MOVING FROM LYING ON BACK TO SITTING ON SIDE OF FLAT BED: A LITTLE
STANDING UP FROM CHAIR USING ARMS: A LITTLE
WALKING IN HOSPITAL ROOM: A LITTLE
DAILY ACTIVITIY SCORE: 20
SUGGESTED CMS G CODE MODIFIER DAILY ACTIVITY: CJ
DRESSING REGULAR LOWER BODY CLOTHING: A LITTLE
TOILETING: A LITTLE
HELP NEEDED FOR BATHING: A LITTLE
SUGGESTED CMS G CODE MODIFIER MOBILITY: CJ
MOBILITY SCORE: 20
CLIMB 3 TO 5 STEPS WITH RAILING: A LITTLE

## 2020-06-10 ASSESSMENT — FIBROSIS 4 INDEX: FIB4 SCORE: 2.43

## 2020-06-10 NOTE — ED TRIAGE NOTES
"..  Chief Complaint   Patient presents with   • Abdominal Pain     abd pain x's 1 day. lower abd pain.    ../92   Pulse 76   Temp 36.6 °C (97.8 °F) (Temporal)   Resp 16   Ht 1.549 m (5' 1\")   Wt 49.8 kg (109 lb 12.6 oz)   SpO2 95%   "

## 2020-06-11 ENCOUNTER — APPOINTMENT (OUTPATIENT)
Dept: RADIOLOGY | Facility: MEDICAL CENTER | Age: 85
DRG: 439 | End: 2020-06-11
Attending: INTERNAL MEDICINE
Payer: MEDICARE

## 2020-06-11 ENCOUNTER — APPOINTMENT (OUTPATIENT)
Dept: RADIOLOGY | Facility: MEDICAL CENTER | Age: 85
DRG: 439 | End: 2020-06-11
Attending: FAMILY MEDICINE
Payer: MEDICARE

## 2020-06-11 PROBLEM — K85.90 PANCREATITIS: Status: ACTIVE | Noted: 2020-06-11

## 2020-06-11 PROBLEM — N18.9 CHRONIC KIDNEY DISEASE (CKD): Status: ACTIVE | Noted: 2020-06-11

## 2020-06-11 PROBLEM — E87.20 METABOLIC ACIDOSIS: Status: ACTIVE | Noted: 2020-06-11

## 2020-06-11 LAB
ANION GAP SERPL CALC-SCNC: 8 MMOL/L (ref 7–16)
BUN SERPL-MCNC: 17 MG/DL (ref 8–22)
CALCIUM SERPL-MCNC: 9.5 MG/DL (ref 8.5–10.5)
CHLORIDE SERPL-SCNC: 106 MMOL/L (ref 96–112)
CO2 SERPL-SCNC: 21 MMOL/L (ref 20–33)
CREAT SERPL-MCNC: 0.9 MG/DL (ref 0.5–1.4)
ERYTHROCYTE [DISTWIDTH] IN BLOOD BY AUTOMATED COUNT: 44.8 FL (ref 35.9–50)
GLUCOSE SERPL-MCNC: 99 MG/DL (ref 65–99)
HCT VFR BLD AUTO: 33.5 % (ref 37–47)
HGB BLD-MCNC: 11.2 G/DL (ref 12–16)
LIPASE SERPL-CCNC: >3000 U/L (ref 11–82)
MCH RBC QN AUTO: 30.9 PG (ref 27–33)
MCHC RBC AUTO-ENTMCNC: 33.4 G/DL (ref 33.6–35)
MCV RBC AUTO: 92.5 FL (ref 81.4–97.8)
PLATELET # BLD AUTO: 236 K/UL (ref 164–446)
PMV BLD AUTO: 8.8 FL (ref 9–12.9)
POTASSIUM SERPL-SCNC: 3.9 MMOL/L (ref 3.6–5.5)
RBC # BLD AUTO: 3.62 M/UL (ref 4.2–5.4)
SODIUM SERPL-SCNC: 135 MMOL/L (ref 135–145)
WBC # BLD AUTO: 11 K/UL (ref 4.8–10.8)

## 2020-06-11 PROCEDURE — 700102 HCHG RX REV CODE 250 W/ 637 OVERRIDE(OP): Performed by: INTERNAL MEDICINE

## 2020-06-11 PROCEDURE — 85027 COMPLETE CBC AUTOMATED: CPT

## 2020-06-11 PROCEDURE — 770006 HCHG ROOM/CARE - MED/SURG/GYN SEMI*

## 2020-06-11 PROCEDURE — 97161 PT EVAL LOW COMPLEX 20 MIN: CPT

## 2020-06-11 PROCEDURE — 80048 BASIC METABOLIC PNL TOTAL CA: CPT

## 2020-06-11 PROCEDURE — 99232 SBSQ HOSP IP/OBS MODERATE 35: CPT | Performed by: FAMILY MEDICINE

## 2020-06-11 PROCEDURE — A9270 NON-COVERED ITEM OR SERVICE: HCPCS | Performed by: INTERNAL MEDICINE

## 2020-06-11 PROCEDURE — 74181 MRI ABDOMEN W/O CONTRAST: CPT

## 2020-06-11 PROCEDURE — 83690 ASSAY OF LIPASE: CPT

## 2020-06-11 PROCEDURE — 76705 ECHO EXAM OF ABDOMEN: CPT

## 2020-06-11 PROCEDURE — 36415 COLL VENOUS BLD VENIPUNCTURE: CPT

## 2020-06-11 RX ADMIN — IRBESARTAN 300 MG: 150 TABLET ORAL at 05:14

## 2020-06-11 RX ADMIN — HYDROCHLOROTHIAZIDE 12.5 MG: 25 TABLET ORAL at 05:14

## 2020-06-11 RX ADMIN — OXYBUTYNIN CHLORIDE 5 MG: 5 TABLET ORAL at 20:39

## 2020-06-11 RX ADMIN — TRAZODONE HYDROCHLORIDE 50 MG: 100 TABLET ORAL at 20:39

## 2020-06-11 RX ADMIN — AMLODIPINE BESYLATE 10 MG: 5 TABLET ORAL at 05:14

## 2020-06-11 ASSESSMENT — COGNITIVE AND FUNCTIONAL STATUS - GENERAL
MOBILITY SCORE: 23
SUGGESTED CMS G CODE MODIFIER MOBILITY: CI
CLIMB 3 TO 5 STEPS WITH RAILING: A LITTLE

## 2020-06-11 ASSESSMENT — ENCOUNTER SYMPTOMS
TINGLING: 0
VOMITING: 0
DIAPHORESIS: 0
NECK PAIN: 0
DIZZINESS: 0
HEMOPTYSIS: 0
SPUTUM PRODUCTION: 0
PALPITATIONS: 0
PHOTOPHOBIA: 0
COUGH: 0
BACK PAIN: 0
HEADACHES: 0
CHILLS: 0
FEVER: 0
DOUBLE VISION: 0
ORTHOPNEA: 0
MYALGIAS: 0
HEARTBURN: 0
NAUSEA: 0
BLURRED VISION: 0

## 2020-06-11 ASSESSMENT — GAIT ASSESSMENTS
ASSISTIVE DEVICE: FRONT WHEEL WALKER;NONE
GAIT LEVEL OF ASSIST: SUPERVISED
DISTANCE (FEET): 90

## 2020-06-11 NOTE — ED NOTES
Unable to obtain med rec at this time. Unable to reach pt/family. Listed home pharmacy is currently closed.

## 2020-06-11 NOTE — THERAPY
Physical Therapy   Initial Evaluation     Patient Name: Julito Mckenzie  Age:  89 y.o., Sex:  female  Medical Record #: [id number]  Today's Date: 6/11/2020     Precautions:  Other (See Comments); abdominal pain    Assessment  After initial evaluation pt has no further acute PT needs. See below for details/recs    Plan    Recommend Physical Therapy for Evaluation only    Discharge recommendations:  Anticipate that the patient will have no further physical therapy needs after discharge from the hospital.       06/11/20 1233   Prior Living Situation   Prior Services None   Housing / Facility 1 Story Apartment / Condo   Steps Into Home 0   Steps In Home 0   Equipment Owned Front-Wheel Walker   Lives with - Patient's Self Care Capacity Alone and Able to Care For Self   Comments reports daughter checks in and assists with transport   Prior Level of Functional Mobility   Bed Mobility Independent   Transfer Status Independent   Ambulation Independent   Distance Ambulation (Feet)   (community)   Assistive Devices Used Front-Wheel Walker   Stairs Unable To Determine At This Time   Comments pt reports avoids stairs as able though does appear capable of performing   Balance Assessment   Sitting Balance (Static) Good   Sitting Balance (Dynamic) Good   Standing Balance (Static) Good   Standing Balance (Dynamic) Good   Weight Shift Sitting Good   Weight Shift Standing Good   Comments no jethro LOB during ambulation with FWW or with no AD   Gait Analysis   Gait Level Of Assist Supervised   Assistive Device Front Wheel Walker;None   Distance (Feet) 90   # of Times Distance was Traveled 1   Deviation Other (Comment);No deviation  (reciprocal gait)   # of Stairs Climbed 0   Weight Bearing Status no restrictions   Comments see balance; ambulated with FWW as well as with no AD with no LOB   Bed Mobility    Supine to Sit Supervised   Sit to Supine Supervised   Scooting Supervised   Functional Mobility   Sit to Stand Supervised   Bed,  Chair, Wheelchair Transfer Supervised   Toilet Transfers Supervised   Transfer Method Other (Comments)  (stand step)   Mobility with FWW/no AD   Anticipated Discharge Equipment   DC Equipment None

## 2020-06-11 NOTE — PROGRESS NOTES
[facility] Medicine Daily Progress Note    Date of Service  6/11/2020    Chief Complaint  89 y.o. female admitted 6/10/2020 with abd pain    Hospital Course     89 y.o. female who presented 6/10/2020 with abdominal pain.  Patient states that started this morning, generalized, sharp, 10/10 at its worse.  She denied any nausea or vomiting.  Upon arrival, patient was noted to have a significant elevation in her lipase, no history of pancreatitis, no alcohol history.  I did discuss the case including labs and imaging with the ER physician.    Interval Problem Update  mrcp is pending    Consultants/Specialty  none    Code Status      Disposition  pending    Review of Systems  Review of Systems   Constitutional: Negative for chills, diaphoresis and fever.   HENT: Negative for ear discharge, ear pain and tinnitus.    Eyes: Negative for blurred vision, double vision and photophobia.   Respiratory: Negative for cough, hemoptysis and sputum production.    Cardiovascular: Negative for chest pain, palpitations and orthopnea.   Gastrointestinal: Negative for heartburn, nausea and vomiting.   Genitourinary: Negative for dysuria, frequency and urgency.   Musculoskeletal: Negative for back pain, myalgias and neck pain.   Skin: Negative for itching and rash.   Neurological: Negative for dizziness, tingling and headaches.        Physical Exam  Temp:  [36.4 °C (97.6 °F)-36.9 °C (98.4 °F)] 36.9 °C (98.4 °F)  Pulse:  [65-92] 92  Resp:  [15-21] 15  BP: (121-171)/(73-92) 122/84  SpO2:  [90 %-99 %] 97 %    Physical Exam  Constitutional:       General: She is not in acute distress.     Appearance: She is not toxic-appearing.   HENT:      Head: Normocephalic and atraumatic.      Nose: Nose normal.      Mouth/Throat:      Mouth: Mucous membranes are dry.   Eyes:      Extraocular Movements: Extraocular movements intact.      Pupils: Pupils are equal, round, and reactive to light.   Neck:      Musculoskeletal: Normal range of motion and neck  supple.   Cardiovascular:      Rate and Rhythm: Normal rate and regular rhythm.      Pulses: Normal pulses.      Heart sounds: Normal heart sounds.   Pulmonary:      Effort: Pulmonary effort is normal.      Breath sounds: Normal breath sounds.   Abdominal:      General: Abdomen is flat.      Palpations: Abdomen is soft.   Musculoskeletal: Normal range of motion.   Skin:     General: Skin is warm and dry.   Neurological:      General: No focal deficit present.      Mental Status: She is alert. Mental status is at baseline.         Fluids  No intake or output data in the 24 hours ending 06/11/20 0832    Laboratory  Recent Labs     06/10/20  1630 06/11/20  0448   WBC 10.3 11.0*   RBC 3.83* 3.62*   HEMOGLOBIN 11.9* 11.2*   HEMATOCRIT 36.3* 33.5*   MCV 94.8 92.5   MCH 31.1 30.9   MCHC 32.8* 33.4*   RDW 47.0 44.8   PLATELETCT 262 236   MPV 8.8* 8.8*     Recent Labs     06/10/20  1630 06/11/20  0448   SODIUM 138 135   POTASSIUM 3.9 3.9   CHLORIDE 104 106   CO2 18* 21   GLUCOSE 101* 99   BUN 21 17   CREATININE 1.16 0.90   CALCIUM 10.3 9.5             Recent Labs     06/10/20  1630   TRIGLYCERIDE 298*   HDL 42   *       Imaging       Assessment/Plan  Pancreatitis- (present on admission)  Assessment & Plan  Feels better  Lipase is pending from today  Ct of abd result is noted  abd ultrasound result is noted  MRCP is pending  Triglyceride level is noted    Chronic kidney disease (CKD)- (present on admission)  Assessment & Plan  Has improved  At base line    Metabolic acidosis- (present on admission)  Assessment & Plan  Has resolved  Iv fluid    Normocytic anemia- (present on admission)  Assessment & Plan  -No sign of gross bleeding  -Repeat CBC in the morning    Essential hypertension- (present on admission)  Assessment & Plan  -Continue home irbesartan, amlodipine  dced hctz since hctz  Can Have side effect of pancreatitis  -Start PRN enalapril  -Adjust as needed       VTE prophylaxis: scd

## 2020-06-11 NOTE — ASSESSMENT & PLAN NOTE
bp is on the low side  Will dc irbesartan, amlodipine for now  dced hctz since hctz  Can Have side effect of pancreatitis  -Start PRN enalapril  -Adjust as needed

## 2020-06-11 NOTE — PROGRESS NOTES
2 RN skin check:    Performed with: DEANNA Richmond    Sacrum, elbows, heels, occiput, ears, and shoulders blades CDIB. No wounds, areas or breakdown, or skin issues noted. Education provided on activity and mobilization encouraged.

## 2020-06-11 NOTE — ED PROVIDER NOTES
"ED Provider Note    CHIEF COMPLAINT  Chief Complaint   Patient presents with   • Abdominal Pain     abd pain x's 1 day. lower abd pain.        HPI  Julito Mckenzie is a 89 y.o. female who presents for evaluation of severe abdominal pain.  She states that early this morning she started experiencing generalized abdominal pain that was severe and constant.  Initial triage note reports that it was lower abdominal pain but she indicates it was her entire abdomen.  She took some antacids and Motrin without any improvement.  Patient denies associated nausea vomiting or diarrhea, no dysuria or hematuria, no back pain or chest pain, no shortness of breath and no history of this in the past.  She states the pain has been constant and severe until the IV was placed in her arm and after it was flushed with saline and the pain resolved.    REVIEW OF SYSTEMS  Negative for fever, rash, chest pain, dyspnea, nausea, vomiting, diarrhea, headache, focal weakness, focal numbness, focal tingling, back pain. All other systems are negative.     PAST MEDICAL HISTORY  Past Medical History:   Diagnosis Date   • Chronic migraine    • Hypertension        FAMILY HISTORY  No family history on file.    SOCIAL HISTORY  Social History     Tobacco Use   • Smoking status: Never Smoker   • Smokeless tobacco: Never Used   Substance Use Topics   • Alcohol use: No   • Drug use: No       SURGICAL HISTORY  No past surgical history on file.    CURRENT MEDICATIONS  I personally reviewed the medication list in the charting documentation.     ALLERGIES  No Known Allergies    MEDICAL RECORD  I have reviewed patient's medical record and pertinent results are listed above.      PHYSICAL EXAM  VITAL SIGNS: BP (!) 168/92   Pulse 65   Temp 36.6 °C (97.8 °F) (Temporal)   Resp (!) 21   Ht 1.549 m (5' 1\")   Wt 49.8 kg (109 lb 12.6 oz)   SpO2 99%   BMI 20.74 kg/m²    Constitutional: Elderly and frail-appearing but in no acute distress  HENT: Mucus membranes " moist.    Eyes: No scleral icterus. Normal conjunctiva   Neck: Supple, comfortable, nonpainful range of motion.   Cardiovascular: Regular heart rate and rhythm.   Thorax & Lungs: Chest is nontender.  Lungs are clear to auscultation with good air movement bilaterally.  No wheeze, rhonchi, nor rales.   Abdomen: Soft, with no tenderness, rebound nor guarding.  No mass or pulsatile mass appreciated.  Skin: Warm, dry. No rash appreciated  Extremities/Musculoskeletal: No sign of trauma. No asymmetric calf tenderness, erythema or edema. Normal range of motion   Neurologic: Alert & oriented. No focal deficits observed.   Psychiatric: Normal affect appropriate for the clinical situation.    DIAGNOSTIC STUDIES / PROCEDURES    LABS/EKGs  Results for orders placed or performed during the hospital encounter of 06/10/20   CBC WITH DIFFERENTIAL   Result Value Ref Range    WBC 10.3 4.8 - 10.8 K/uL    RBC 3.83 (L) 4.20 - 5.40 M/uL    Hemoglobin 11.9 (L) 12.0 - 16.0 g/dL    Hematocrit 36.3 (L) 37.0 - 47.0 %    MCV 94.8 81.4 - 97.8 fL    MCH 31.1 27.0 - 33.0 pg    MCHC 32.8 (L) 33.6 - 35.0 g/dL    RDW 47.0 35.9 - 50.0 fL    Platelet Count 262 164 - 446 K/uL    MPV 8.8 (L) 9.0 - 12.9 fL    Neutrophils-Polys 77.40 (H) 44.00 - 72.00 %    Lymphocytes 17.60 (L) 22.00 - 41.00 %    Monocytes 3.60 0.00 - 13.40 %    Eosinophils 0.60 0.00 - 6.90 %    Basophils 0.50 0.00 - 1.80 %    Immature Granulocytes 0.30 0.00 - 0.90 %    Nucleated RBC 0.00 /100 WBC    Neutrophils (Absolute) 7.94 (H) 2.00 - 7.15 K/uL    Lymphs (Absolute) 1.80 1.00 - 4.80 K/uL    Monos (Absolute) 0.37 0.00 - 0.85 K/uL    Eos (Absolute) 0.06 0.00 - 0.51 K/uL    Baso (Absolute) 0.05 0.00 - 0.12 K/uL    Immature Granulocytes (abs) 0.03 0.00 - 0.11 K/uL    NRBC (Absolute) 0.00 K/uL   LIPASE   Result Value Ref Range    Lipase >3000 (H) 11 - 82 U/L   URINALYSIS CULTURE, IF INDICATED    Specimen: Urine   Result Value Ref Range    Color Yellow     Character Clear     Specific Gravity  1.012 <1.035    Ph 6.5 5.0 - 8.0    Glucose Negative Negative mg/dL    Ketones Trace (A) Negative mg/dL    Protein Negative Negative mg/dL    Bilirubin Negative Negative    Urobilinogen, Urine 0.2 Negative    Nitrite Negative Negative    Leukocyte Esterase Trace (A) Negative    Occult Blood Negative Negative    Micro Urine Req Microscopic    LACTIC ACID   Result Value Ref Range    Lactic Acid 1.5 0.5 - 2.0 mmol/L   Comp Metabolic Panel   Result Value Ref Range    Sodium 138 135 - 145 mmol/L    Potassium 3.9 3.6 - 5.5 mmol/L    Chloride 104 96 - 112 mmol/L    Co2 18 (L) 20 - 33 mmol/L    Anion Gap 16.0 7.0 - 16.0    Glucose 101 (H) 65 - 99 mg/dL    Bun 21 8 - 22 mg/dL    Creatinine 1.16 0.50 - 1.40 mg/dL    Calcium 10.3 8.5 - 10.5 mg/dL    AST(SGOT) 25 12 - 45 U/L    ALT(SGPT) 12 2 - 50 U/L    Alkaline Phosphatase 70 30 - 99 U/L    Total Bilirubin 0.3 0.1 - 1.5 mg/dL    Albumin 4.2 3.2 - 4.9 g/dL    Total Protein 7.4 6.0 - 8.2 g/dL    Globulin 3.2 1.9 - 3.5 g/dL    A-G Ratio 1.3 g/dL   URINE MICROSCOPIC (W/UA)   Result Value Ref Range    WBC 5-10 (A) /hpf    RBC 0-2 /hpf    Bacteria Negative None /hpf    Epithelial Cells Negative /hpf    Hyaline Cast 0-2 /lpf   ESTIMATED GFR   Result Value Ref Range    GFR If  53 (A) >60 mL/min/1.73 m 2    GFR If Non  44 (A) >60 mL/min/1.73 m 2        RADIOLOGY  CT-ABDOMEN-PELVIS WITH   Final Result      1.  Minimal induration of the peripancreatic fat which may reflect pancreatitis.      2.  No pancreatic fluid collection.      3.  No cholelithiasis or biliary dilatation.      4.  No evidence of bowel obstruction or inflammation.      No follow up imaging is recommended per consensus guidelines of the 2019 ACR Incidental Findings Committee for probably benign incidental simple appearing renal cystic lesion(s) based on imaging criteria.            COURSE & MEDICAL DECISION MAKING  I have reviewed any medical record information, laboratory studies and  radiographic results as noted above.  Differential diagnoses includes: Hepatitis, pancreatitis, bowel obstruction, ischemic bowel, dehydration, electrolyte abnormalities, anemia    Encounter Summary: This is a 89 y.o. female with acute onset of severe generalized abdominal pain that persisted until an IV was placed here in the emergency department, once it was flushed with saline and the pain resolved, no associated symptoms like nausea vomiting or diarrhea, she appears elderly and frail on exam but was in no acute distress and nontoxic during my evaluation.  No indication of peritonitis on exam.  Will obtain blood work as well as CT scan of the abdomen and pelvis, she will be reevaluated ------ blood work reveals very high lipase suggesting acute pancreatitis.  The CT scan reveals some minimal evidence of pancreatitis but no evidence of biliary obstruction or gallstones, patient's pain has returned and required additional doses of pain medication, admitted to the hospital in the care of Dr. Clark in guarded condition    DISPOSITION: Admit in guarded condition      FINAL IMPRESSION  1. Acute pancreatitis without infection or necrosis, unspecified pancreatitis type           This dictation was created using voice recognition software. The accuracy of the dictation is limited to the abilities of the software. I expect there may be some errors of grammar and possibly content. The nursing notes were reviewed and certain aspects of this information were incorporated into this note.    Electronically signed by: Collins Conde M.D., 6/10/2020 5:09 PM

## 2020-06-11 NOTE — H&P
Hospital Medicine History & Physical Note    Date of Service  6/10/2020    Primary Care Physician  Mike Guajardo M.D.    Code Status  Full Code    Chief Complaint  Chief Complaint   Patient presents with   • Abdominal Pain     abd pain x's 1 day. lower abd pain.        History of Presenting Illness  89 y.o. female who presented 6/10/2020 with abdominal pain.  Patient states that started this morning, generalized, sharp, 10/10 at its worse.  She denied any nausea or vomiting.  Upon arrival, patient was noted to have a significant elevation in her lipase, no history of pancreatitis, no alcohol history.  I did discuss the case including labs and imaging with the ER physician.    Review of Systems  Review of Systems   Constitutional: Negative for chills, fever and malaise/fatigue.   HENT: Negative for congestion.    Respiratory: Negative for cough, sputum production, shortness of breath and stridor.    Cardiovascular: Negative for chest pain, palpitations and leg swelling.   Gastrointestinal: Positive for abdominal pain. Negative for constipation, diarrhea, nausea and vomiting.   Genitourinary: Negative for dysuria and urgency.   Musculoskeletal: Negative for falls and myalgias.   Neurological: Negative for dizziness, tingling, loss of consciousness, weakness and headaches.   Psychiatric/Behavioral: Negative for depression and suicidal ideas.   All other systems reviewed and are negative.      Past Medical History   has a past medical history of Chronic migraine and Hypertension.    Surgical History  None    Family History  Hypertension    Social History   reports that she has never smoked. She has never used smokeless tobacco. She reports that she does not drink alcohol or use drugs.    Allergies  No Known Allergies    Medications  Prior to Admission Medications   Prescriptions Last Dose Informant Patient Reported? Taking?   amLODIPine (NORVASC) 10 MG Tab  Patient's Home Pharmacy Yes No   Sig: Take 10 mg by mouth  every day.   irbesartan-hydrochlorothiazide (AVALIDE) 300-12.5 MG per tablet  Patient's Home Pharmacy Yes No   Sig: Take 1 Tab by mouth every day.   losartan-hydrochlorothiazide (HYZAAR) 100-12.5 MG per tablet  Patient's Home Pharmacy Yes No   Sig: Take 1 Tab by mouth every day.   oxybutynin (DITROPAN) 5 MG Tab  Patient's Home Pharmacy Yes No   Sig: Take 5 mg by mouth every bedtime.   traZODone (DESYREL) 50 MG Tab  Patient's Home Pharmacy Yes No   Sig: Take 50 mg by mouth every evening.      Facility-Administered Medications: None       Physical Exam  Temp:  [36.6 °C (97.8 °F)] 36.6 °C (97.8 °F)  Pulse:  [65-76] 70  Resp:  [16-21] 21  BP: (159-171)/(84-92) 171/84  SpO2:  [90 %-99 %] 96 %    Physical Exam  Vitals signs and nursing note reviewed.   Constitutional:       General: She is not in acute distress.     Appearance: She is well-developed. She is not diaphoretic.      Comments: Thin and frail appearing    HENT:      Head: Normocephalic and atraumatic.      Right Ear: External ear normal.      Left Ear: External ear normal.      Nose: Nose normal. No congestion or rhinorrhea.      Mouth/Throat:      Mouth: Mucous membranes are dry.      Pharynx: No oropharyngeal exudate.   Eyes:      General:         Right eye: No discharge.         Left eye: No discharge.      Extraocular Movements: Extraocular movements intact.   Neck:      Musculoskeletal: Normal range of motion and neck supple.      Trachea: No tracheal deviation.   Cardiovascular:      Rate and Rhythm: Normal rate and regular rhythm.      Heart sounds: No murmur. No friction rub. No gallop.    Pulmonary:      Effort: Pulmonary effort is normal. No respiratory distress.      Breath sounds: Normal breath sounds. No stridor. No wheezing or rales.   Chest:      Chest wall: No tenderness.   Abdominal:      General: Bowel sounds are normal. There is no distension.      Palpations: Abdomen is soft.      Tenderness: There is generalized abdominal tenderness.    Musculoskeletal: Normal range of motion.         General: No tenderness.      Right lower leg: No edema.      Left lower leg: No edema.   Lymphadenopathy:      Cervical: No cervical adenopathy.   Skin:     General: Skin is warm and dry.      Coloration: Skin is not jaundiced.      Findings: No erythema or rash.   Neurological:      General: No focal deficit present.      Mental Status: She is alert and oriented to person, place, and time.      Cranial Nerves: No cranial nerve deficit.   Psychiatric:         Mood and Affect: Mood normal.         Behavior: Behavior normal.         Thought Content: Thought content normal.         Judgment: Judgment normal.         Laboratory:  Recent Labs     06/10/20  1630   WBC 10.3   RBC 3.83*   HEMOGLOBIN 11.9*   HEMATOCRIT 36.3*   MCV 94.8   MCH 31.1   MCHC 32.8*   RDW 47.0   PLATELETCT 262   MPV 8.8*     Recent Labs     06/10/20  1630   SODIUM 138   POTASSIUM 3.9   CHLORIDE 104   CO2 18*   GLUCOSE 101*   BUN 21   CREATININE 1.16   CALCIUM 10.3     Recent Labs     06/10/20  1630   ALTSGPT 12   ASTSGOT 25   ALKPHOSPHAT 70   TBILIRUBIN 0.3   LIPASE >3000*   GLUCOSE 101*         No results for input(s): NTPROBNP in the last 72 hours.      No results for input(s): TROPONINT in the last 72 hours.    Imaging:  CT-ABDOMEN-PELVIS WITH   Final Result      1.  Minimal induration of the peripancreatic fat which may reflect pancreatitis.      2.  No pancreatic fluid collection.      3.  No cholelithiasis or biliary dilatation.      4.  No evidence of bowel obstruction or inflammation.      No follow up imaging is recommended per consensus guidelines of the 2019 ACR Incidental Findings Committee for probably benign incidental simple appearing renal cystic lesion(s) based on imaging criteria.      US-RUQ    (Results Pending)         Assessment/Plan:    Pancreatitis- (present on admission)  Assessment & Plan  -I did personally review her CT abdomen/pelvis, noted some mild inflammatory changes  associated with the pancreas as well as bilateral renal cysts  -Lipase is greater than 3000  -Keep patient n.p.o.  -Start IV fluids  -Symptomatic care with narcotics and antiemetics  -Obtain a right upper quadrant ultrasound as well as a lipid panel    Chronic kidney disease (CKD)- (present on admission)  Assessment & Plan  -At baseline, she is at risk of worsening considering she is dehydrated  -Repeat BMP in the morning    Metabolic acidosis- (present on admission)  Assessment & Plan  -Likely due to dehydration and kidney disease  -Start IV fluids  -Repeat BMP in the morning    Normocytic anemia- (present on admission)  Assessment & Plan  -No sign of gross bleeding  -Repeat CBC in the morning    Essential hypertension- (present on admission)  Assessment & Plan  -Continue home irbesartan, hydrochlorothiazide and amlodipine  -Start PRN enalapril  -Adjust as needed

## 2020-06-11 NOTE — ASSESSMENT & PLAN NOTE
Feels better  Lipase has trended down  Will advance diet to gi soft  Ct of abd result is noted  abd ultrasound result is noted  MRCP showed:.  Mild pancreatic and peripancreatic edema consistent with pancreatitis.     2.  Pancreatic ductal dilatation, likely related to calcification in the pancreatic head on CT.     3.  Irregularity of the pancreatic duct may represent chronic pancreatitis     4.  Common bile duct is within normal limits for the patient's age. No choledocholithiasis is identified.  Triglyceride level is noted

## 2020-06-12 LAB
ALBUMIN SERPL BCP-MCNC: 3.3 G/DL (ref 3.2–4.9)
ALBUMIN/GLOB SERPL: 1.1 G/DL
ALP SERPL-CCNC: 58 U/L (ref 30–99)
ALT SERPL-CCNC: 11 U/L (ref 2–50)
ANION GAP SERPL CALC-SCNC: 11 MMOL/L (ref 7–16)
AST SERPL-CCNC: 21 U/L (ref 12–45)
BASOPHILS # BLD AUTO: 0.3 % (ref 0–1.8)
BASOPHILS # BLD: 0.03 K/UL (ref 0–0.12)
BILIRUB SERPL-MCNC: 0.6 MG/DL (ref 0.1–1.5)
BUN SERPL-MCNC: 20 MG/DL (ref 8–22)
CALCIUM SERPL-MCNC: 9.4 MG/DL (ref 8.5–10.5)
CHLORIDE SERPL-SCNC: 104 MMOL/L (ref 96–112)
CO2 SERPL-SCNC: 19 MMOL/L (ref 20–33)
CREAT SERPL-MCNC: 1.26 MG/DL (ref 0.5–1.4)
EKG IMPRESSION: NORMAL
EOSINOPHIL # BLD AUTO: 0.05 K/UL (ref 0–0.51)
EOSINOPHIL NFR BLD: 0.4 % (ref 0–6.9)
ERYTHROCYTE [DISTWIDTH] IN BLOOD BY AUTOMATED COUNT: 46.8 FL (ref 35.9–50)
GLOBULIN SER CALC-MCNC: 3 G/DL (ref 1.9–3.5)
GLUCOSE SERPL-MCNC: 90 MG/DL (ref 65–99)
HCT VFR BLD AUTO: 32.7 % (ref 37–47)
HGB BLD-MCNC: 10.9 G/DL (ref 12–16)
IMM GRANULOCYTES # BLD AUTO: 0.04 K/UL (ref 0–0.11)
IMM GRANULOCYTES NFR BLD AUTO: 0.3 % (ref 0–0.9)
LIPASE SERPL-CCNC: 1135 U/L (ref 11–82)
LYMPHOCYTES # BLD AUTO: 1.69 K/UL (ref 1–4.8)
LYMPHOCYTES NFR BLD: 14.7 % (ref 22–41)
MCH RBC QN AUTO: 31.3 PG (ref 27–33)
MCHC RBC AUTO-ENTMCNC: 33.3 G/DL (ref 33.6–35)
MCV RBC AUTO: 94 FL (ref 81.4–97.8)
MONOCYTES # BLD AUTO: 0.76 K/UL (ref 0–0.85)
MONOCYTES NFR BLD AUTO: 6.6 % (ref 0–13.4)
NEUTROPHILS # BLD AUTO: 8.92 K/UL (ref 2–7.15)
NEUTROPHILS NFR BLD: 77.7 % (ref 44–72)
NRBC # BLD AUTO: 0 K/UL
NRBC BLD-RTO: 0 /100 WBC
PLATELET # BLD AUTO: 215 K/UL (ref 164–446)
PMV BLD AUTO: 8.8 FL (ref 9–12.9)
POTASSIUM SERPL-SCNC: 4.1 MMOL/L (ref 3.6–5.5)
PROT SERPL-MCNC: 6.3 G/DL (ref 6–8.2)
RBC # BLD AUTO: 3.48 M/UL (ref 4.2–5.4)
SODIUM SERPL-SCNC: 134 MMOL/L (ref 135–145)
WBC # BLD AUTO: 11.5 K/UL (ref 4.8–10.8)

## 2020-06-12 PROCEDURE — 97165 OT EVAL LOW COMPLEX 30 MIN: CPT

## 2020-06-12 PROCEDURE — 93005 ELECTROCARDIOGRAM TRACING: CPT | Performed by: HOSPITALIST

## 2020-06-12 PROCEDURE — 770006 HCHG ROOM/CARE - MED/SURG/GYN SEMI*

## 2020-06-12 PROCEDURE — A9270 NON-COVERED ITEM OR SERVICE: HCPCS | Performed by: INTERNAL MEDICINE

## 2020-06-12 PROCEDURE — 700102 HCHG RX REV CODE 250 W/ 637 OVERRIDE(OP): Performed by: INTERNAL MEDICINE

## 2020-06-12 PROCEDURE — 36415 COLL VENOUS BLD VENIPUNCTURE: CPT

## 2020-06-12 PROCEDURE — 700105 HCHG RX REV CODE 258: Performed by: FAMILY MEDICINE

## 2020-06-12 PROCEDURE — 99232 SBSQ HOSP IP/OBS MODERATE 35: CPT | Performed by: FAMILY MEDICINE

## 2020-06-12 PROCEDURE — 85025 COMPLETE CBC W/AUTO DIFF WBC: CPT

## 2020-06-12 PROCEDURE — 93010 ELECTROCARDIOGRAM REPORT: CPT | Performed by: INTERNAL MEDICINE

## 2020-06-12 PROCEDURE — 83690 ASSAY OF LIPASE: CPT

## 2020-06-12 PROCEDURE — 80053 COMPREHEN METABOLIC PANEL: CPT

## 2020-06-12 PROCEDURE — 51798 US URINE CAPACITY MEASURE: CPT

## 2020-06-12 RX ADMIN — OXYBUTYNIN CHLORIDE 5 MG: 5 TABLET ORAL at 20:40

## 2020-06-12 RX ADMIN — ACETAMINOPHEN 650 MG: 325 TABLET, FILM COATED ORAL at 17:00

## 2020-06-12 RX ADMIN — TRAZODONE HYDROCHLORIDE 50 MG: 100 TABLET ORAL at 20:40

## 2020-06-12 RX ADMIN — SODIUM CHLORIDE, POTASSIUM CHLORIDE, SODIUM LACTATE AND CALCIUM CHLORIDE: 600; 310; 30; 20 INJECTION, SOLUTION INTRAVENOUS at 17:01

## 2020-06-12 ASSESSMENT — COGNITIVE AND FUNCTIONAL STATUS - GENERAL
HELP NEEDED FOR BATHING: A LITTLE
DAILY ACTIVITIY SCORE: 18
DRESSING REGULAR UPPER BODY CLOTHING: A LITTLE
DRESSING REGULAR LOWER BODY CLOTHING: A LITTLE
TOILETING: A LITTLE
PERSONAL GROOMING: A LITTLE
EATING MEALS: A LITTLE
SUGGESTED CMS G CODE MODIFIER DAILY ACTIVITY: CK

## 2020-06-12 ASSESSMENT — ENCOUNTER SYMPTOMS
TREMORS: 0
VOMITING: 0
PALPITATIONS: 0
TINGLING: 0
NECK PAIN: 0
FEVER: 0
SHORTNESS OF BREATH: 0
BACK PAIN: 0
ORTHOPNEA: 0
DIAPHORESIS: 0
NAUSEA: 0
HEADACHES: 0
CLAUDICATION: 0
CHILLS: 0
EYE PAIN: 0
HEMOPTYSIS: 0
PHOTOPHOBIA: 0
DOUBLE VISION: 0
ABDOMINAL PAIN: 0
SPUTUM PRODUCTION: 0

## 2020-06-12 ASSESSMENT — ACTIVITIES OF DAILY LIVING (ADL): TOILETING: INDEPENDENT

## 2020-06-12 NOTE — CARE PLAN
Problem: Safety  Goal: Will remain free from injury  Outcome: PROGRESSING AS EXPECTED     Problem: Knowledge Deficit  Goal: Knowledge of the prescribed therapeutic regimen will improve  Outcome: PROGRESSING AS EXPECTED     Problem: Pain Management  Goal: Pain level will decrease to patient's comfort goal  Outcome: PROGRESSING AS EXPECTED     Problem: Urinary Elimination:  Goal: Ability to reestablish a normal urinary elimination pattern will improve  Outcome: PROGRESSING AS EXPECTED

## 2020-06-12 NOTE — PROGRESS NOTES
Assumed pt care at 0700. Received bedside report .     Pt Alert and oriented x  4 .VSS. POC discussed and education provided on administered medications. All questions and concerns addressed. Fall precautions, and hourly rounding in place.     Patient states very tired this AM, but overall feeling better. No reports of abdominal pain, denies N/V. Diet upgraded to GI soft and tolerated with no complications. No HR irregularities or low BP noted. Lipase down trending. Plan for AM labs then possible discharge home tomorrow.

## 2020-06-12 NOTE — DOCUMENTATION QUERY
[facility]                                                                       Query Response Note      PATIENT:               SHERRIE FALK  ACCT #:                  [id number]  MRN:                     [id number]  :                      [id number]  ADMIT DATE:       6/10/2020 3:31 PM  DISCH DATE:          RESPONDING  PROVIDER #:        [id number]           QUERY TEXT:    Chronic Kidney Disease (CKD) is documented in the Medical Record.  Please specify the disease stage (includes probable or suspected)    Stages are defined by the National Kid[employer] Foundation as follows:  CKD Stage I  GFR >= 90 ml / min per 1.73 m2 and persistent albuminuria  CKD Stage 2 GFR between 60 and 89 with persistent albuminuria  CKD Stage 3 GFR between 30 and 59   CKD Stage 4 GFR between 15 and 29   CKD Stage 5 GFR between <15 or End Stage Renal Disease    The patient's Clinical Indicators include:  Chronic kidney disease   6/10 Bun 21, Creatinine 1.16, GFR 44,    Bun 17, Creatinine 0.90, GFR 59  Risk Factors: htn  Treatment: IVF, repeat BMP  Options provided:   -- Chronic kidney disease Stage 1   -- Chronic kidney disease Stage 2   -- Chronic kidney disease Stage 3   -- Chronic kidney disease Stage 4   -- Chronic kidney disease Stage 5   -- Chronic kidney disease Stage 5, requiring dialysis   -- End Stage Renal Disease   -- Unable to determine      Query created by: Sera Forrester on 2020 8:04 AM    RESPONSE TEXT:    Chronic kidney disease Stage 3          Electronically signed by:  OVI POLLARD DO 2020 11:27 PM

## 2020-06-12 NOTE — THERAPY
Occupational Therapy   Initial Evaluation     Patient Name: Julito Mckenzie  Age:  89 y.o., Sex:  female  Medical Record #: [id number]  Today's Date: 6/12/2020     Precautions  Precautions: Fall Risk  Comments: here for abdominal pain    Assessment  Patient is 89 y.o. female with a diagnosis of abd pain, workup ongoing.  Additional factors influencing patient status / progress: weakness, fatigue, impaired balance .      Plan    Recommend Occupational Therapy 2 times per week until therapy goals are met for the following treatments:  Adaptive Equipment, Neuro Re-Education / Balance, Self Care/Activities of Daily Living, Therapeutic Activities and Therapeutic Exercises.    Discharge recommendations: Recommend home health for continued occupational therapy services and support from family as needed for IADLs       06/12/20 1046   Prior Living Situation   Prior Services Home-Independent   Housing / Facility 1 Story Apartment / Condo   Bathroom Set up Walk In Shower   Equipment Owned Front-Wheel Walker   Lives with - Patient's Self Care Capacity Alone and Able to Care For Self   Comments Dtr assists with IADLs   Prior Level of ADL Function   Self Feeding Independent   Grooming / Hygiene Independent   Bathing Independent   Dressing Independent   Toileting Independent   Prior Level of IADL Function   Medication Management Independent   Laundry Requires Assist   Kitchen Mobility Independent   Finances Independent   Home Management Requires Assist   Shopping Requires Assist   Prior Level Of Mobility Independent With Device in Community;Independent With Device in Home   Driving / Transportation Relatives / Others Provide Transportation   Occupation (Pre-Hospital Vocational) Retired Due To Age   Bed Mobility    Supine to Sit Supervised   Sit to Supine Supervised   Scooting Supervised   ADL Assessment   Grooming Minimal Assist;Standing   Lower Body Dressing Minimal Assist   Toileting Supervision   Functional Mobility   Sit to  Stand Supervised   Bed, Chair, Wheelchair Transfer Minimal Assist   Toilet Transfers Minimal Assist   Mobility EOB>BR>BTB   Patient / Family Goals   Patient / Family Goal #1 to go home   Short Term Goals   Short Term Goal # 1 pt will demo toilet txf with supv   Short Term Goal # 2 pt will dress LB with supv   Short Term Goal # 3 pt will groom in stance with supv   Anticipated Discharge Equipment   DC Equipment Unable To Determine At This Time

## 2020-06-12 NOTE — PROGRESS NOTES
Hospital Medicine Daily Progress Note    Date of Service  6/12/2020    Chief Complaint  89 y.o. female admitted 6/10/2020 with abd pain    Hospital Course     89 y.o. female who presented 6/10/2020 with abdominal pain.  Patient states that started this morning, generalized, sharp, 10/10 at its worse.  She denied any nausea or vomiting.  Upon arrival, patient was noted to have a significant elevation in her lipase, no history of pancreatitis, no alcohol history.  I did discuss the case including labs and imaging with the ER physician.    Interval Problem Update  mrcp is pending    Consultants/Specialty  none    Code Status      Disposition  pending    Review of Systems  Review of Systems   Constitutional: Negative for chills, diaphoresis and fever.   HENT: Negative for ear discharge, ear pain and nosebleeds.    Eyes: Negative for double vision, photophobia and pain.   Respiratory: Negative for hemoptysis, sputum production and shortness of breath.    Cardiovascular: Negative for palpitations, orthopnea and claudication.   Gastrointestinal: Negative for abdominal pain, nausea and vomiting.   Genitourinary: Negative for frequency, hematuria and urgency.   Musculoskeletal: Negative for back pain, joint pain and neck pain.   Skin: Negative for itching and rash.   Neurological: Negative for tingling, tremors and headaches.        Physical Exam  Temp:  [36.3 °C (97.4 °F)-36.9 °C (98.4 °F)] 36.4 °C (97.5 °F)  Pulse:  [] 78  Resp:  [13-15] 15  BP: ()/(58-81) 110/61  SpO2:  [94 %-98 %] 97 %    Physical Exam  Constitutional:       Appearance: She is not toxic-appearing or diaphoretic.   HENT:      Head: Normocephalic and atraumatic.      Nose: No congestion or rhinorrhea.      Mouth/Throat:      Mouth: Mucous membranes are dry.   Eyes:      Extraocular Movements: Extraocular movements intact.      Conjunctiva/sclera: Conjunctivae normal.   Neck:      Musculoskeletal: No neck rigidity or muscular tenderness.  RX denied, already refilled on 10-.  Patient needs to make an appointment with physician before any additional refills.         Cardiovascular:      Rate and Rhythm: Normal rate and regular rhythm.      Heart sounds: No murmur. No friction rub.   Pulmonary:      Effort: No respiratory distress.      Breath sounds: No stridor.   Abdominal:      General: Bowel sounds are normal.      Palpations: Abdomen is soft.   Musculoskeletal:         General: No swelling or tenderness.   Skin:     Coloration: Skin is not jaundiced.      Findings: No bruising.   Neurological:      General: No focal deficit present.      Mental Status: She is alert. Mental status is at baseline.         Fluids    Intake/Output Summary (Last 24 hours) at 6/12/2020 1113  Last data filed at 6/12/2020 0800  Gross per 24 hour   Intake 240 ml   Output --   Net 240 ml       Laboratory  Recent Labs     06/10/20  1630 06/11/20  0448 06/12/20  0432   WBC 10.3 11.0* 11.5*   RBC 3.83* 3.62* 3.48*   HEMOGLOBIN 11.9* 11.2* 10.9*   HEMATOCRIT 36.3* 33.5* 32.7*   MCV 94.8 92.5 94.0   MCH 31.1 30.9 31.3   MCHC 32.8* 33.4* 33.3*   RDW 47.0 44.8 46.8   PLATELETCT 262 236 215   MPV 8.8* 8.8* 8.8*     Recent Labs     06/10/20  1630 06/11/20  0448 06/12/20  0432   SODIUM 138 135 134*   POTASSIUM 3.9 3.9 4.1   CHLORIDE 104 106 104   CO2 18* 21 19*   GLUCOSE 101* 99 90   BUN 21 17 20   CREATININE 1.16 0.90 1.26   CALCIUM 10.3 9.5 9.4             Recent Labs     06/10/20  1630   TRIGLYCERIDE 298*   HDL 42   *       Imaging       Assessment/Plan  Pancreatitis- (present on admission)  Assessment & Plan  Feels better  Lipase has trended down  Will advance diet to gi soft  Ct of abd result is noted  abd ultrasound result is noted  MRCP showed:.  Mild pancreatic and peripancreatic edema consistent with pancreatitis.     2.  Pancreatic ductal dilatation, likely related to calcification in the pancreatic head on CT.     3.  Irregularity of the pancreatic duct may represent chronic pancreatitis     4.  Common bile duct is within normal limits for the patient's age. No choledocholithiasis is  identified.  Triglyceride level is noted    Chronic kidney disease (CKD)- (present on admission)  Assessment & Plan  Has improved  At base line    Metabolic acidosis- (present on admission)  Assessment & Plan  Has resolved  Iv fluid    Normocytic anemia- (present on admission)  Assessment & Plan  -No sign of gross bleeding  -Repeat CBC in the morning    Essential hypertension- (present on admission)  Assessment & Plan  bp is on the low side  Will dc irbesartan, amlodipine for now  dced hctz since hctz  Can Have side effect of pancreatitis  -Start PRN enalapril  -Adjust as needed       VTE prophylaxis: scd

## 2020-06-12 NOTE — PROGRESS NOTES
Pt's BP 82/58 at this time with HR jumping from . Pt. asymptomatic and denies any chest pain, SOB, or dizziness. Pt. repositioned and BP taken in both arms and manual BP checked with best reading of 88/58. On-call hospitalist, Dr. Fung paged.     0400: No call back, paged MD second time.    0415: No call back, MD pageedson third time, Stat.     0417: Received page back from Dr. Fung. Patient's BP 91/64 at time of page back. No orders for bolus at this time. Dr. Fung notified on irregular heart rhythm. Orders received for stat 12 lead EKG.    0420: Called EKG tech for stat EKG order, tech at another stat page and to come to bedside next.

## 2020-06-13 VITALS
RESPIRATION RATE: 16 BRPM | BODY MASS INDEX: 20.73 KG/M2 | WEIGHT: 109.79 LBS | HEART RATE: 80 BPM | HEIGHT: 61 IN | OXYGEN SATURATION: 98 % | TEMPERATURE: 97.4 F | DIASTOLIC BLOOD PRESSURE: 76 MMHG | SYSTOLIC BLOOD PRESSURE: 135 MMHG

## 2020-06-13 LAB
ALBUMIN SERPL BCP-MCNC: 2.9 G/DL (ref 3.2–4.9)
ALBUMIN/GLOB SERPL: 0.9 G/DL
ALP SERPL-CCNC: 52 U/L (ref 30–99)
ALT SERPL-CCNC: 10 U/L (ref 2–50)
ANION GAP SERPL CALC-SCNC: 8 MMOL/L (ref 7–16)
AST SERPL-CCNC: 23 U/L (ref 12–45)
BASOPHILS # BLD AUTO: 0.1 % (ref 0–1.8)
BASOPHILS # BLD: 0.01 K/UL (ref 0–0.12)
BILIRUB SERPL-MCNC: 0.5 MG/DL (ref 0.1–1.5)
BUN SERPL-MCNC: 21 MG/DL (ref 8–22)
CALCIUM SERPL-MCNC: 8.9 MG/DL (ref 8.5–10.5)
CHLORIDE SERPL-SCNC: 106 MMOL/L (ref 96–112)
CO2 SERPL-SCNC: 20 MMOL/L (ref 20–33)
CREAT SERPL-MCNC: 0.93 MG/DL (ref 0.5–1.4)
EOSINOPHIL # BLD AUTO: 0.14 K/UL (ref 0–0.51)
EOSINOPHIL NFR BLD: 1.4 % (ref 0–6.9)
ERYTHROCYTE [DISTWIDTH] IN BLOOD BY AUTOMATED COUNT: 48.1 FL (ref 35.9–50)
GLOBULIN SER CALC-MCNC: 3.1 G/DL (ref 1.9–3.5)
GLUCOSE SERPL-MCNC: 100 MG/DL (ref 65–99)
HCT VFR BLD AUTO: 29.7 % (ref 37–47)
HGB BLD-MCNC: 9.6 G/DL (ref 12–16)
IMM GRANULOCYTES # BLD AUTO: 0.02 K/UL (ref 0–0.11)
IMM GRANULOCYTES NFR BLD AUTO: 0.2 % (ref 0–0.9)
LIPASE SERPL-CCNC: 511 U/L (ref 11–82)
LYMPHOCYTES # BLD AUTO: 1.24 K/UL (ref 1–4.8)
LYMPHOCYTES NFR BLD: 12.1 % (ref 22–41)
MCH RBC QN AUTO: 31.1 PG (ref 27–33)
MCHC RBC AUTO-ENTMCNC: 32.3 G/DL (ref 33.6–35)
MCV RBC AUTO: 96.1 FL (ref 81.4–97.8)
MONOCYTES # BLD AUTO: 0.87 K/UL (ref 0–0.85)
MONOCYTES NFR BLD AUTO: 8.5 % (ref 0–13.4)
NEUTROPHILS # BLD AUTO: 7.96 K/UL (ref 2–7.15)
NEUTROPHILS NFR BLD: 77.7 % (ref 44–72)
NRBC # BLD AUTO: 0 K/UL
NRBC BLD-RTO: 0 /100 WBC
PLATELET # BLD AUTO: 206 K/UL (ref 164–446)
PMV BLD AUTO: 9.1 FL (ref 9–12.9)
POTASSIUM SERPL-SCNC: 3.6 MMOL/L (ref 3.6–5.5)
PROT SERPL-MCNC: 6 G/DL (ref 6–8.2)
RBC # BLD AUTO: 3.09 M/UL (ref 4.2–5.4)
SODIUM SERPL-SCNC: 134 MMOL/L (ref 135–145)
WBC # BLD AUTO: 10.2 K/UL (ref 4.8–10.8)

## 2020-06-13 PROCEDURE — 700102 HCHG RX REV CODE 250 W/ 637 OVERRIDE(OP): Performed by: INTERNAL MEDICINE

## 2020-06-13 PROCEDURE — 36415 COLL VENOUS BLD VENIPUNCTURE: CPT

## 2020-06-13 PROCEDURE — 99239 HOSP IP/OBS DSCHRG MGMT >30: CPT | Performed by: FAMILY MEDICINE

## 2020-06-13 PROCEDURE — 83690 ASSAY OF LIPASE: CPT

## 2020-06-13 PROCEDURE — 700105 HCHG RX REV CODE 258: Performed by: FAMILY MEDICINE

## 2020-06-13 PROCEDURE — 85025 COMPLETE CBC W/AUTO DIFF WBC: CPT

## 2020-06-13 PROCEDURE — 80053 COMPREHEN METABOLIC PANEL: CPT

## 2020-06-13 PROCEDURE — A9270 NON-COVERED ITEM OR SERVICE: HCPCS | Performed by: INTERNAL MEDICINE

## 2020-06-13 RX ADMIN — ACETAMINOPHEN 650 MG: 325 TABLET, FILM COATED ORAL at 02:55

## 2020-06-13 RX ADMIN — SODIUM CHLORIDE, POTASSIUM CHLORIDE, SODIUM LACTATE AND CALCIUM CHLORIDE: 600; 310; 30; 20 INJECTION, SOLUTION INTRAVENOUS at 04:17

## 2020-06-13 NOTE — DISCHARGE PLANNING
Received Choice form at 3234  Agency/Facility Name: [facility]   Referral sent per Choice form @ [phone]

## 2020-06-13 NOTE — PROGRESS NOTES
Pt discharged home in a wheelchair with granddaughter. Discharge education done at bedside with pt.  to follow up with Home Health PT setup. IV access removed. All belongings sent with pt.

## 2020-06-13 NOTE — DISCHARGE SUMMARY
Discharge Summary    CHIEF COMPLAINT ON ADMISSION  Chief Complaint   Patient presents with   • Abdominal Pain     abd pain x's 1 day. lower abd pain.        Reason for Admission  abdominal pain     Admission Date  6/10/2020    CODE STATUS  Full Code    HPI & HOSPITAL COURSE  This is a 89 y.o. female here with  abdominal pain.  Patient states that started this morning, generalized, sharp, 10/10 at its worse.  She denied any nausea or vomiting.  Upon arrival, patient was noted to have a significant elevation in her lipase, no history of pancreatitis, no alcohol history.   She was admitted with acute pancreatitis.  Mri of the abd showed:1.  Mild pancreatic and peripancreatic edema consistent with pancreatitis.     2.  Pancreatic ductal dilatation, likely related to calcification in the pancreatic head on CT.     3.  Irregularity of the pancreatic duct may represent chronic pancreatitis     4.  Common bile duct is within normal limits for the patient's age. No choledocholithiasis is identified.  Her trigly ceride  Was mildly elevated and her hctz was dced and should not be resumed.  Her abd pain has resolved and her diet was advanced.her lipase also has trended down.  Her bp has been on the low side and her home bp meds are dced.  Pt and ot worked with the pt and recommended HH.  She will be discharged home.      Constitutional:       Appearance: She is not toxic-appearing or diaphoretic.   HENT:      Head: Normocephalic and atraumatic.      Nose: No congestion or rhinorrhea.      Mouth/Throat:      Mouth: Mucous membranes are dry.   Eyes:      Extraocular Movements: Extraocular movements intact.      Conjunctiva/sclera: Conjunctivae normal.   Neck:      Musculoskeletal: No neck rigidity or muscular tenderness.   Cardiovascular:      Rate and Rhythm: Normal rate and regular rhythm.      Heart sounds: No murmur. No friction rub.   Pulmonary:      Effort: No respiratory distress.      Breath sounds: No stridor.    Abdominal:      General: Bowel sounds are normal.      Palpations: Abdomen is soft.   Musculoskeletal:         General: No swelling or tenderness.   Skin:     Coloration: Skin is not jaundiced.      Findings: No bruising.   Neurological:      General: No focal deficit present.      Mental Status: She is alert. Mental status is at baseline.       Pancreatitis- (present on admission)  Assessment & Plan  Feels better  Lipase has trended down  Ct of abd result is noted  abd ultrasound result is noted  MRCP showed:.  Mild pancreatic and peripancreatic edema consistent with pancreatitis.     2.  Pancreatic ductal dilatation, likely related to calcification in the pancreatic head on CT.     3.  Irregularity of the pancreatic duct may represent chronic pancreatitis     4.  Common bile duct is within normal limits for the patient's age. No choledocholithiasis is identified.  Triglyceride level is noted     Chronic kidney disease (CKD)- (present on admission)  Assessment & Plan  Has improved  At base line     Metabolic acidosis- (present on admission)  Assessment & Plan  resolved         Essential hypertension- (present on admission)  Assessment & Plan  bp is on the low side  Will dc irbesartan, amlodipine for now  dced hctz since hctz  Can Have side effect of pancreatitis          Therefore, she is discharged in good and stable condition to home with close outpatient follow-up.    The patient met 2-midnight criteria for an inpatient stay at the time of discharge.    Discharge Date  6/13/20    FOLLOW UP ITEMS POST DISCHARGE  pcp    DISCHARGE DIAGNOSES  Active Problems:    Pancreatitis POA: Yes    Essential hypertension POA: Yes    Normocytic anemia POA: Yes    Metabolic acidosis POA: Yes    Chronic kidney disease (CKD) POA: Yes  Resolved Problems:    * No resolved hospital problems. *      FOLLOW UP  No future appointments.  No follow-up provider specified.    MEDICATIONS ON DISCHARGE     Medication List      CONTINUE  taking these medications      Instructions   oxybutynin 5 MG Tabs  Commonly known as:  DITROPAN   Take 5 mg by mouth every bedtime.  Dose:  5 mg     traZODone 50 MG Tabs  Commonly known as:  DESYREL   Take 50 mg by mouth every evening.  Dose:  50 mg        STOP taking these medications    amLODIPine 10 MG Tabs  Commonly known as:  NORVASC     irbesartan-hydrochlorothiazide 300-12.5 MG per tablet  Commonly known as:  AVALIDE     losartan-hydrochlorothiazide 100-12.5 MG per tablet  Commonly known as:  HYZAAR            Allergies  No Known Allergies    DIET  Orders Placed This Encounter   Procedures   • Diet Order Low Fiber(GI Soft)     Standing Status:   Standing     Number of Occurrences:   1     Order Specific Question:   Diet:     Answer:   Low Fiber(GI Soft) [2]       ACTIVITY  As tolerated.  Weight bearing as tolerated    CONSULTATIONS  none    PROCEDURES  none    LABORATORY  Lab Results   Component Value Date    SODIUM 134 (L) 06/13/2020    POTASSIUM 3.6 06/13/2020    CHLORIDE 106 06/13/2020    CO2 20 06/13/2020    GLUCOSE 100 (H) 06/13/2020    BUN 21 06/13/2020    CREATININE 0.93 06/13/2020    CREATININE 1.2 02/10/2009        Lab Results   Component Value Date    WBC 10.2 06/13/2020    HEMOGLOBIN 9.6 (L) 06/13/2020    HEMATOCRIT 29.7 (L) 06/13/2020    PLATELETCT 206 06/13/2020        Total time of the discharge process exceeds 35 minutes.

## 2020-06-13 NOTE — DISCHARGE INSTRUCTIONS
Acute Pancreatitis  Acute pancreatitis is a condition in which the pancreas suddenly becomes irritated and swollen (has inflammation). The pancreas is a gland that is located behind the stomach. It produces enzymes that help to digest food. The pancreas also releases the hormones glucagon and insulin, which help to regulate blood sugar. Damage to the pancreas occurs when the digestive enzymes from the pancreas are activated before they are released into the intestine.  Most acute attacks last a couple of days and can cause serious problems. Some people become dehydrated and develop low blood pressure. In severe cases, bleeding into the pancreas can lead to shock and can be life-threatening. The lungs, heart, and kidneys may fail.  What are the causes?  The most common causes of this condition are:  · Alcohol abuse.  · Gallstones.  Other causes include:  · Certain medicines.  · Exposure to certain chemicals.  · Infection.  · Damage caused by an accident (trauma).  · Abdominal surgery.  In some cases, the cause may not be known.  What are the signs or symptoms?  Symptoms of this condition include:  · Pain in the upper abdomen that may radiate to the back.  · Tenderness and swelling of the abdomen.  · Nausea and vomiting.  How is this diagnosed?  This condition may be diagnosed based on:  · A physical exam.  · Blood tests.  · Imaging tests, such as X-rays, CT scans, or an ultrasound of the abdomen.  How is this treated?  Treatment for this condition usually requires a stay in the hospital. Treatment may include:  · Pain medicine.  · Fluid replacement through an IV tube.  · Placing a tube in the stomach to remove stomach contents and to control vomiting (NG tube, or nasogastric tube).  · Not eating for 3-4 days. This gives the pancreas a rest, because enzymes are not being produced that can cause further damage.  · Antibiotic medicines, if your condition is caused by an infection.  · Surgery on the pancreas or  gallbladder.  Follow these instructions at home:  Eating and drinking  · Follow instructions from your health care provider about diet. This may involve avoiding alcohol and decreasing the amount of fat in your diet.  · Eat smaller, more frequent meals. This reduces the amount of digestive fluids that the pancreas produces.  · Drink enough fluid to keep your urine clear or pale yellow.  · Do not drink alcohol if it caused your condition.  General instructions  · Take over-the-counter and prescription medicines only as told by your health care provider.  · Do not use any tobacco products, such as cigarettes, chewing tobacco, and e-cigarettes. If you need help quitting, ask your health care provider.  · Get plenty of rest.  · If directed, check your blood sugar at home as told by your health care provider.  · Keep all follow-up visits as told by your health care provider. This is important.  Contact a health care provider if:  · You do not recover as quickly as expected.  · You develop new or worsening symptoms.  · You have persistent pain, weakness, or nausea.  · You recover and then have another episode of pain.  · You have a fever.  Get help right away if:  · You cannot eat or keep fluids down.  · Your pain becomes severe.  · Your skin or the white part of your eyes turns yellow (jaundice).  · You vomit.  · You feel dizzy or you faint.  · Your blood sugar is high (over 300 mg/dL).  This information is not intended to replace advice given to you by your health care provider. Make sure you discuss any questions you have with your health care provider.  Document Released: 12/18/2006 Document Revised: 04/26/2017 Document Reviewed: 09/20/2016  [employer] Interactive Patient Education © 2017 [employer]  Discharge Instructions    Discharged to home by car with relative. Discharged via wheelchair, hospital escort: Yes.  Special equipment needed: Not Applicable    Be sure to schedule a follow-up appointment with your  primary care doctor or any specialists as instructed.     Discharge Plan:   Diet Plan: Discussed  Activity Level: Discussed  Confirmed Follow up Appointment: Patient to Call and Schedule Appointment  Confirmed Symptoms Management: Discussed  Medication Reconciliation Updated: Yes    I understand that a diet low in cholesterol, fat, and sodium is recommended for good health. Unless I have been given specific instructions below for another diet, I accept this instruction as my diet prescription.   Other diet: N/A    Special Instructions: None    · Is patient discharged on Warfarin / Coumadin?   No     Depression / Suicide Risk    As you are discharged from this [facility] facility, it is important to learn how to keep safe from harming yourself.    Recognize the warning signs:  · Abrupt changes in personality, positive or negative- including increase in energy   · Giving away possessions  · Change in eating patterns- significant weight changes-  positive or negative  · Change in sleeping patterns- unable to sleep or sleeping all the time   · Unwillingness or inability to communicate  · Depression  · Unusual sadness, discouragement and loneliness  · Talk of wanting to die  · Neglect of personal appearance   · Rebelliousness- reckless behavior  · Withdrawal from people/activities they love  · Confusion- inability to concentrate     If you or a loved one observes any of these behaviors or has concerns about self-harm, here's what you can do:  · Talk about it- your feelings and reasons for harming yourself  · Remove any means that you might use to hurt yourself (examples: pills, rope, extension cords, firearm)  · Get professional help from the community (Mental Health, Substance Abuse, psychological counseling)  · Do not be alone:Call your Safe Contact- someone whom you trust who will be there for you.  · Call your local CRISIS HOTLINE 013-7766 or 873-222-7086  · Call your local Children's Mobile Crisis Response Team  [facility] (822) 747-0504 or www.[employer]  · Call the toll free National Suicide Prevention Hotlines   · National Suicide Prevention Lifeline 606-373-CTVW (0575)  · National Hope Line Network 800-SUICIDE (288-8060)

## 2020-06-13 NOTE — CARE PLAN
Problem: Communication  Goal: The ability to communicate needs accurately and effectively will improve  Outcome: PROGRESSING AS EXPECTED  Intervention: [place] patient and significant other/support system to call light to alert staff of needs  Flowsheets (Taken 6/12/2020 2200)  Oriented to::   Unit Routine   Call Light & Bedside Controls     Problem: Safety  Goal: Will remain free from falls  Outcome: PROGRESSING SLOWER THAN EXPECTED  Intervention: Implement fall precautions  Flowsheets  Taken 6/12/2020 2200  Bed Alarm: Yes - Alarm On  Taken 6/12/2020 2000  Environmental Precautions:   Treaded Slipper Socks on Patient   Bed in Low Position

## 2020-06-13 NOTE — CARE PLAN
Pt A+Ox3-4. Denies any abdominal pain. Lipase trended down to 511. Plan to discharge home today and have pt follow up with PCP. Pt also to discharge home with home health PT. CM working on HHPT set up and pt ok to be discharged and CM will follow up after weekend.

## 2020-06-13 NOTE — DISCHARGE PLANNING
Anticipated Discharge Disposition: home with HH    Action: Reviewed home health choice over the phone with pt and referral to be sent to   [facility]. Verified address and phone on face sheet is correct.     Barriers to Discharge: accepting HH    Plan: referral faxed to [facility] to send home health referral-await their acceptance. [facility] aware of above. They may not be able to verify accepting PCP of Dr Guajardo until Monday.

## 2020-06-13 NOTE — FACE TO FACE
Face to Face Supporting Documentation - Home Health    The encounter with this patient was in whole or in part the primary reason for home health admission.    Date of encounter:   Patient:                    MRN:                       YOB: 2020  Julito Mckenzie  [id number]  9/12/1928     Home health to see patient for:  Physical Therapy evaluation and treatment    Skilled need for:  Comment: 89 yr olf frail female    Skilled nursing interventions to include:  Comment: 89 yr olf frail female    Homebound status evidenced by:  Needs the assistance of another person in order to leave the home. Leaving home requires a considerable and taxing effort. There is a normal inability to leave the home.    Community Physician to provide follow up care: Mike Guajardo M.D.     Optional Interventions? No      I certify the face to face encounter for this home health care referral meets the CMS requirements and the encounter/clinical assessment with the patient was, in whole, or in part, for the medical condition(s) listed above, which is the primary reason for home health care. Based on my clinical findings: the service(s) are medically necessary, support the need for home health care, and the homebound criteria are met.  I certify that this patient has had a face to face encounter by myself.  JUSTO Banegas M.D. - NPI: [id number]

## 2020-06-13 NOTE — DISCHARGE PLANNING
Anticipated Discharge Disposition:   Home with home health    Action:    Patient admitted with pancreatitis.    Patient stated she lives alone in first story apartment in [place].  Pt has a cat.  She uses a wheel chair at home and is able to walk small distances and can transfer herself form chair to toilet, chair, bed.  She cooks and is able to bathe and dress herself and she prepares her medications.  Pt c/o arthritis in her right hand.  Pt stated her daughter and grandchildren live in [place] and help her as needed.    Barriers to Discharge:    Medical clearance    Plan:    Provide transitional care coordination.    Care Transition Team Assessment    Information Source  Orientation : Oriented x 4  Information Given By: Patient  Who is responsible for making decisions for patient? : Patient    Readmission Evaluation  Is this a readmission?: No    Elopement Risk  Legal Hold: No  Ambulatory or Self Mobile in Wheelchair: Yes  Disoriented: No  Psychiatric Symptoms: None  History of Wandering: No  Elopement this Admit: No  Vocalizing Wanting to Leave: No  Displays Behaviors, Body Language Wanting to Leave: No-Not at Risk for Elopement  Elopement Risk: Not at Risk for Elopement    Interdisciplinary Discharge Planning  Lives with - Patient's Self Care Capacity: Alone and Able to Care For Self  Patient or legal guardian wants to designate a caregiver (see row info): No  Housing / Facility: 1 Story Apartment / [facility]  Prior Services: Home-Independent    Discharge Preparedness  What is your plan after discharge?: Uncertain - pending medical team collaboration  What are your discharge supports?: Child, Other (comment)  Prior Functional Level: Ambulatory, Independent with Activities of Daily Living, Independent with Medication Management, Uses Wheelchair  Difficulity with ADLs: Walking  Difficulity with IADLs: Driving, Shopping    Functional Assesment  Prior Functional Level: Ambulatory, Independent with Activities of Daily  Living, Independent with Medication Management, Uses Wheelchair    Finances  Financial Barriers to Discharge: No  Prescription Coverage: Yes    Vision / Hearing Impairment  Vision Impairment : No  Hearing Impairment : No         Advance Directive  Advance Directive?: None    Domestic Abuse  Have you ever been the victim of abuse or violence?: No  Physical Abuse or Sexual Abuse: No  Verbal Abuse or Emotional Abuse: No  Possible Abuse Reported to:: Not Applicable         Discharge Risks or Barriers  Discharge risks or barriers?: No  Patient risk factors: Vulnerable adult    Anticipated Discharge Information  Anticipated discharge disposition: [facility], Home  Discharge Address: [address] [place] [id number]  Discharge Contact Phone Number: [phone]

## 2020-06-15 ENCOUNTER — PATIENT OUTREACH (OUTPATIENT)
Dept: HEALTH INFORMATION MANAGEMENT | Facility: OTHER | Age: 85
End: 2020-06-15

## 2020-06-15 NOTE — DISCHARGE PLANNING
Agency/Facility Name: [facility]  Outcome: Left vmail for Jaida, requested call back.    1420  Per Jaida, Dr. Guajardo is known for declining to follow for home health services ordered by [facility]. The pt has an appt on Tues 6/16 for the Dr to determine if they feel that HH is necessary.

## 2020-06-23 ENCOUNTER — HOME HEALTH ADMISSION (OUTPATIENT)
Dept: HOME HEALTH SERVICES | Facility: HOME HEALTHCARE | Age: 85
End: 2020-06-23
Payer: MEDICARE

## 2020-06-25 ENCOUNTER — HOME CARE VISIT (OUTPATIENT)
Dept: HOME HEALTH SERVICES | Facility: HOME HEALTHCARE | Age: 85
End: 2020-06-25

## 2020-06-29 ENCOUNTER — HOME CARE VISIT (OUTPATIENT)
Dept: HOME HEALTH SERVICES | Facility: HOME HEALTHCARE | Age: 85
End: 2020-06-29

## 2020-06-30 ENCOUNTER — HOME HEALTH ADMISSION (OUTPATIENT)
Dept: HOME HEALTH SERVICES | Facility: HOME HEALTHCARE | Age: 85
End: 2020-06-30
Payer: MEDICARE

## 2020-07-01 ENCOUNTER — HOME HEALTH ADMISSION (OUTPATIENT)
Dept: HOME HEALTH SERVICES | Facility: HOME HEALTHCARE | Age: 85
End: 2020-07-01
Payer: MEDICARE

## 2020-07-09 ENCOUNTER — HOSPITAL ENCOUNTER (OUTPATIENT)
Facility: MEDICAL CENTER | Age: 85
End: 2020-07-09
Attending: PHYSICIAN ASSISTANT
Payer: MEDICARE

## 2020-07-09 ENCOUNTER — HOME CARE VISIT (OUTPATIENT)
Dept: HOME HEALTH SERVICES | Facility: HOME HEALTHCARE | Age: 85
End: 2020-07-09
Payer: MEDICARE

## 2020-07-09 VITALS
HEART RATE: 70 BPM | DIASTOLIC BLOOD PRESSURE: 80 MMHG | WEIGHT: 105 LBS | TEMPERATURE: 97.4 F | SYSTOLIC BLOOD PRESSURE: 146 MMHG | BODY MASS INDEX: 19.83 KG/M2 | OXYGEN SATURATION: 99 % | HEIGHT: 61 IN | RESPIRATION RATE: 18 BRPM

## 2020-07-09 LAB
ALBUMIN SERPL BCP-MCNC: 3.9 G/DL (ref 3.2–4.9)
ALBUMIN/GLOB SERPL: 1.3 G/DL
ALP SERPL-CCNC: 75 U/L (ref 30–99)
ALT SERPL-CCNC: 14 U/L (ref 2–50)
ANION GAP SERPL CALC-SCNC: 12 MMOL/L (ref 7–16)
APPEARANCE UR: CLEAR
AST SERPL-CCNC: 23 U/L (ref 12–45)
BASOPHILS # BLD AUTO: 0.6 % (ref 0–1.8)
BASOPHILS # BLD: 0.04 K/UL (ref 0–0.12)
BILIRUB SERPL-MCNC: 0.5 MG/DL (ref 0.1–1.5)
BILIRUB UR QL STRIP.AUTO: NEGATIVE
BUN SERPL-MCNC: 21 MG/DL (ref 8–22)
CALCIUM SERPL-MCNC: 9.1 MG/DL (ref 8.5–10.5)
CHLORIDE SERPL-SCNC: 108 MMOL/L (ref 96–112)
CO2 SERPL-SCNC: 17 MMOL/L (ref 20–33)
COLOR UR: YELLOW
CREAT SERPL-MCNC: 1.2 MG/DL (ref 0.5–1.4)
EOSINOPHIL # BLD AUTO: 0.11 K/UL (ref 0–0.51)
EOSINOPHIL NFR BLD: 1.5 % (ref 0–6.9)
ERYTHROCYTE [DISTWIDTH] IN BLOOD BY AUTOMATED COUNT: 52.3 FL (ref 35.9–50)
FERRITIN SERPL-MCNC: 236 NG/ML (ref 10–291)
GLOBULIN SER CALC-MCNC: 3.1 G/DL (ref 1.9–3.5)
GLUCOSE SERPL-MCNC: 87 MG/DL (ref 65–99)
GLUCOSE UR STRIP.AUTO-MCNC: NEGATIVE MG/DL
HCT VFR BLD AUTO: 31 % (ref 37–47)
HGB BLD-MCNC: 9.9 G/DL (ref 12–16)
IMM GRANULOCYTES # BLD AUTO: 0.01 K/UL (ref 0–0.11)
IMM GRANULOCYTES NFR BLD AUTO: 0.1 % (ref 0–0.9)
KETONES UR STRIP.AUTO-MCNC: NEGATIVE MG/DL
LEUKOCYTE ESTERASE UR QL STRIP.AUTO: NEGATIVE
LYMPHOCYTES # BLD AUTO: 2.15 K/UL (ref 1–4.8)
LYMPHOCYTES NFR BLD: 29.8 % (ref 22–41)
MCH RBC QN AUTO: 31.2 PG (ref 27–33)
MCHC RBC AUTO-ENTMCNC: 31.9 G/DL (ref 33.6–35)
MCV RBC AUTO: 97.8 FL (ref 81.4–97.8)
MICRO URNS: NORMAL
MONOCYTES # BLD AUTO: 0.54 K/UL (ref 0–0.85)
MONOCYTES NFR BLD AUTO: 7.5 % (ref 0–13.4)
NEUTROPHILS # BLD AUTO: 4.36 K/UL (ref 2–7.15)
NEUTROPHILS NFR BLD: 60.5 % (ref 44–72)
NITRITE UR QL STRIP.AUTO: NEGATIVE
NRBC # BLD AUTO: 0 K/UL
NRBC BLD-RTO: 0 /100 WBC
PH UR STRIP.AUTO: 6 [PH] (ref 5–8)
PLATELET # BLD AUTO: 259 K/UL (ref 164–446)
PMV BLD AUTO: 9.1 FL (ref 9–12.9)
POTASSIUM SERPL-SCNC: 4.1 MMOL/L (ref 3.6–5.5)
PROT SERPL-MCNC: 7 G/DL (ref 6–8.2)
PROT UR QL STRIP: NEGATIVE MG/DL
RBC # BLD AUTO: 3.17 M/UL (ref 4.2–5.4)
RBC UR QL AUTO: NEGATIVE
SODIUM SERPL-SCNC: 137 MMOL/L (ref 135–145)
SP GR UR STRIP.AUTO: 1.01
UROBILINOGEN UR STRIP.AUTO-MCNC: 0.2 MG/DL
VIT B12 SERPL-MCNC: 648 PG/ML (ref 211–911)
WBC # BLD AUTO: 7.2 K/UL (ref 4.8–10.8)

## 2020-07-09 PROCEDURE — 85025 COMPLETE CBC W/AUTO DIFF WBC: CPT

## 2020-07-09 PROCEDURE — 82607 VITAMIN B-12: CPT

## 2020-07-09 PROCEDURE — 6650336 HCR  CONTAINER SHARPS 1 QT

## 2020-07-09 PROCEDURE — G0493 RN CARE EA 15 MIN HH/HOSPICE: HCPCS

## 2020-07-09 PROCEDURE — 80053 COMPREHEN METABOLIC PANEL: CPT

## 2020-07-09 PROCEDURE — 82728 ASSAY OF FERRITIN: CPT

## 2020-07-09 PROCEDURE — 81003 URINALYSIS AUTO W/O SCOPE: CPT

## 2020-07-09 PROCEDURE — 665001 SOC-HOME HEALTH

## 2020-07-09 ASSESSMENT — FIBROSIS 4 INDEX: FIB4 SCORE: 2.16

## 2020-07-09 ASSESSMENT — ENCOUNTER SYMPTOMS
DIFFICULTY THINKING: 1
POOR JUDGMENT: 1

## 2020-07-09 NOTE — PROGRESS NOTES
Lilia, please send CC to [facility] (Fax: [phone])    Primary dx/Skilled need: fatigue, HTN, Anemia, Increased frequency of urination, urinary incontinence, ataxia, arthritis of both hands, debility, impaired memory and cognition, hx of pancreatitis           SN frequency: 2w2, 1w7, 3 prn            Zip code: [address]           Disciplines ordered: SN, PT, OT, MSW            Insurance & authorization: 7/9/20 to 9/6/20             Certification period: SCP    Special considerations: Call grand-dtr Katherine to schedule. Use the side gate at the end of the parking lot (closer to pt's unit). Front entrance telecom not working. Call pt when you arrive, and pt/family will open side gate.

## 2020-07-09 NOTE — PROGRESS NOTES
Lilia, please send CC to [facility] (Fax: [phone])    Opened patient to [facility] medication reconciliation process done. Medication list left in home care folder. See MAR for drug allergy interactions. There are no major drug to drug interactions.                    The best contact phone number is [phone] and grand-daughter Katherine manages the medications.

## 2020-07-09 NOTE — Clinical Note
Faisal Sena,    I'm so sorry. I checked it by mistake. Already removed and sent updated Clinical Summary to PCP. Thank you very much for catching that.     Puja    ----- Message -----  From: Jaida Pineda R.N.  Sent: 7/10/2020   8:33 AM PDT  To: TARA Gomes,     What physician-ordered restrictions does she have that you mentioned in your homebound status documentation?    Thanks,    Jaida  ----- Message -----  From: Odilon León R.N.  Sent: 7/9/2020  12:26 PM PDT  To: Jaida Pineda R.N.    Please get 1w1 insurance authorization for SLP eval due to impaired memory and cognition.

## 2020-07-10 ENCOUNTER — ANTICOAGULATION MONITORING (OUTPATIENT)
Dept: MEDICAL GROUP | Facility: PHYSICIAN GROUP | Age: 85
End: 2020-07-10

## 2020-07-10 ASSESSMENT — ENCOUNTER SYMPTOMS
SHORTNESS OF BREATH: T
VOMITING: DENIES
NAUSEA: DENIES

## 2020-07-10 ASSESSMENT — PATIENT HEALTH QUESTIONNAIRE - PHQ9
2. FEELING DOWN, DEPRESSED, IRRITABLE, OR HOPELESS: 00
1. LITTLE INTEREST OR PLEASURE IN DOING THINGS: 00
CLINICAL INTERPRETATION OF PHQ2 SCORE: 0

## 2020-07-10 ASSESSMENT — ACTIVITIES OF DAILY LIVING (ADL): OASIS_M1830: 03

## 2020-07-10 NOTE — PROGRESS NOTES
Received referral from [facility]. Medications reviewed. No clinically significant interactions noted.

## 2020-07-11 ENCOUNTER — HOME CARE VISIT (OUTPATIENT)
Dept: HOME HEALTH SERVICES | Facility: HOME HEALTHCARE | Age: 85
End: 2020-07-11
Payer: MEDICARE

## 2020-07-11 PROCEDURE — G0299 HHS/HOSPICE OF RN EA 15 MIN: HCPCS

## 2020-07-12 VITALS
OXYGEN SATURATION: 96 % | SYSTOLIC BLOOD PRESSURE: 110 MMHG | RESPIRATION RATE: 18 BRPM | TEMPERATURE: 97.9 F | HEART RATE: 77 BPM | DIASTOLIC BLOOD PRESSURE: 70 MMHG

## 2020-07-12 ASSESSMENT — ENCOUNTER SYMPTOMS
NAUSEA: DENIES
MUSCLE WEAKNESS: 1
LIMITED RANGE OF MOTION: 1
VOMITING: DENIES

## 2020-07-12 NOTE — PROGRESS NOTES
"Pt is alert and oriented x4 today, sometimes forgetful at her baseline, able to recall her life in [place] before she came to the [facility] and some family stories. /70 (previous reading 146/80 at SOC), \"it's normal for me.\" per pt. All VSS. Pt denies any dizziness or lightheadedness. Pt denies any slips, trips or falls. Called and spoke with Katherine after this visit. Katherine told this nurse that pt has med planner but doesn't like to us e it and she noticed pt had missed her scheduled meds before. Will disscuss using med planner and/or setting up med reminder with pt next visit.   "

## 2020-07-13 ENCOUNTER — PATIENT OUTREACH (OUTPATIENT)
Dept: HEALTH INFORMATION MANAGEMENT | Facility: OTHER | Age: 85
End: 2020-07-13

## 2020-07-13 ENCOUNTER — HOME CARE VISIT (OUTPATIENT)
Dept: HOME HEALTH SERVICES | Facility: HOME HEALTHCARE | Age: 85
End: 2020-07-13
Payer: MEDICARE

## 2020-07-13 VITALS
DIASTOLIC BLOOD PRESSURE: 68 MMHG | RESPIRATION RATE: 18 BRPM | SYSTOLIC BLOOD PRESSURE: 122 MMHG | HEART RATE: 87 BPM | OXYGEN SATURATION: 97 % | TEMPERATURE: 98.7 F

## 2020-07-13 PROCEDURE — G0153 HHCP-SVS OF S/L PATH,EA 15MN: HCPCS

## 2020-07-13 ASSESSMENT — ENCOUNTER SYMPTOMS
APPETITE LEVEL: GOOD
ANGER WITHIN DEFINED LIMITS: 1
AGGRESSION WITHIN DEFINED LIMITS: 1
DIFFICULTY THINKING: 1

## 2020-07-14 ENCOUNTER — HOME CARE VISIT (OUTPATIENT)
Dept: HOME HEALTH SERVICES | Facility: HOME HEALTHCARE | Age: 85
End: 2020-07-14
Payer: MEDICARE

## 2020-07-14 VITALS
DIASTOLIC BLOOD PRESSURE: 74 MMHG | TEMPERATURE: 98.5 F | SYSTOLIC BLOOD PRESSURE: 122 MMHG | RESPIRATION RATE: 18 BRPM | OXYGEN SATURATION: 99 % | HEART RATE: 82 BPM

## 2020-07-14 PROCEDURE — G0151 HHCP-SERV OF PT,EA 15 MIN: HCPCS

## 2020-07-14 SDOH — ECONOMIC STABILITY: HOUSING INSECURITY: HOME SAFETY: PT LIVES ALONE - ALNNA GRANDDAUGHTER COMES AND CHECKS ON HER DAILY, HOME IS CLEAN AND WELL TAKEN CARE OF

## 2020-07-14 ASSESSMENT — GAIT ASSESSMENTS
PATH SCORE: 1
STEP SYMMETRY: 1 - RIGHT AND LEFT STEP LENGTH APPEAR EQUAL
GAIT SCORE: 8
INITIATION OF GAIT IMMEDIATELY AFTER GO: 1 - NO HESITANCY
WALKING STANCE: 1 - HEELS ALMOST TOUCHING WHILE WALKING
STEP CONTINUITY: 0 - STOPPING OR DISCONTINUITY BETWEEN STEPS
PATH: 1 - MILD/MODERATE DEVIATION OR USES WALKING AID
TRUNK SCORE: 0
BALANCE AND GAIT SCORE: 21
TRUNK: 0 - MARKED SWAY OR USES WALKING AID

## 2020-07-14 ASSESSMENT — ACTIVITIES OF DAILY LIVING (ADL)
AMBULATION ASSISTANCE: INDEPENDENT
CURRENT_FUNCTION: INDEPENDENT
FEEDING_COMMENTS: SEE OT EVAL FOR MORE DETAILS ON ADLS
PHYSICAL TRANSFERS ASSESSED: 1
BATHING_COMMENTS: SEE OT EVAL FOR MORE DETAILS ON ADLS
ADLS_COMMENTS: SEE OT EVAL FOR MORE DETAILS ON ADLS
GROOMING_COMMENTS: SEE OT EVAL FOR MORE DETAILS ON ADLS
AMBULATION ASSISTANCE ON FLAT SURFACES: 1
PHYSICAL_TRANSFERS_DEVICES: 4WW
AMBULATION ASSISTANCE: 1

## 2020-07-14 ASSESSMENT — BALANCE ASSESSMENTS
NUDGED: 2 - STEADY
ARISING SCORE: 1
EYES CLOSED AT MAXIMUM POSITION NUDGED: 1 - STEADY
SITTING BALANCE: 1 - STEADY, SAFE
STANDING BALANCE: 1 - STEADY BUT WIDE STANCE AND USES CANE OR OTHER SUPPORT
TURNING 360 DEGREES STEPS: 1 - CONTINUOUS STEPS
SITTING DOWN: 1 - USES ARMS OR NOT SMOOTH MOTION
ARISES: 1 - ABLE, USES ARMS TO HELP
ATTEMPTS TO ARISE: 2 - ABLE TO RISE, ONE ATTEMPT
IMMEDIATE STANDING BALANCE FIRST 5 SECONDS: 2 - STEADY WITHOUT WALKER OR OTHER SUPPORT
BALANCE SCORE: 13
NUDGED SCORE: 2

## 2020-07-14 ASSESSMENT — ENCOUNTER SYMPTOMS
MUSCLE WEAKNESS: 1
ARTHRALGIAS: 1

## 2020-07-16 NOTE — PROGRESS NOTES
A 89-year-old female was an emergent admission to [facility] from 6/10/2020 to 6/13/2020 to treat acute pancreatitis. The Patient Navigator visited the patient bedside. The patient was discharged home with [facility]. The patient's medical conditions included: anemia, hypertension, and kidney disease. The patient was not under clinical case management.     The patient has no new medication orders from her discharge from the hospital.    The patient was discharged home to follow-up with the primary care provider, [facility], and [facility]. The patient had the following appointments:  On 6/17/2020 @ 11:00 the patient kept an appointment with a primary care provider through [facility] Samantha Bustos.  On 6/30/2020 @ 3:30 the patient kept an appointment with a primary care provider through [facility] Samantha Bustos.  The patient declined an appointment with her primary care physician and [facility].     The Patient Navigator successfully communicated with the patient post discharge and throughout the case. The Patient Navigator referred patient to [facility] to which the patient accepted services and continued services throughout the case. The Patient Navigator also provided the patient with information about [employer] for transportation assistance.

## 2020-07-17 ENCOUNTER — HOME CARE VISIT (OUTPATIENT)
Dept: HOME HEALTH SERVICES | Facility: HOME HEALTHCARE | Age: 85
End: 2020-07-17
Payer: MEDICARE

## 2020-07-17 VITALS
SYSTOLIC BLOOD PRESSURE: 105 MMHG | OXYGEN SATURATION: 98 % | RESPIRATION RATE: 18 BRPM | DIASTOLIC BLOOD PRESSURE: 70 MMHG | TEMPERATURE: 98.3 F | HEART RATE: 80 BPM

## 2020-07-17 PROCEDURE — G0495 RN CARE TRAIN/EDU IN HH: HCPCS

## 2020-07-17 ASSESSMENT — ENCOUNTER SYMPTOMS
VOMITING: DENIES
NAUSEA: DENIES

## 2020-07-20 ENCOUNTER — HOME CARE VISIT (OUTPATIENT)
Dept: HOME HEALTH SERVICES | Facility: HOME HEALTHCARE | Age: 85
End: 2020-07-20
Payer: MEDICARE

## 2020-07-20 VITALS
DIASTOLIC BLOOD PRESSURE: 68 MMHG | OXYGEN SATURATION: 99 % | SYSTOLIC BLOOD PRESSURE: 124 MMHG | TEMPERATURE: 98.4 F | RESPIRATION RATE: 18 BRPM | HEART RATE: 84 BPM

## 2020-07-20 PROCEDURE — G0493 RN CARE EA 15 MIN HH/HOSPICE: HCPCS

## 2020-07-20 PROCEDURE — G0152 HHCP-SERV OF OT,EA 15 MIN: HCPCS

## 2020-07-20 ASSESSMENT — ENCOUNTER SYMPTOMS: MUSCLE WEAKNESS: 1

## 2020-07-21 VITALS
TEMPERATURE: 98 F | OXYGEN SATURATION: 99 % | HEART RATE: 85 BPM | SYSTOLIC BLOOD PRESSURE: 124 MMHG | DIASTOLIC BLOOD PRESSURE: 68 MMHG | RESPIRATION RATE: 18 BRPM

## 2020-07-22 ENCOUNTER — HOME CARE VISIT (OUTPATIENT)
Dept: HOME HEALTH SERVICES | Facility: HOME HEALTHCARE | Age: 85
End: 2020-07-22
Payer: MEDICARE

## 2020-07-22 PROCEDURE — G0151 HHCP-SERV OF PT,EA 15 MIN: HCPCS

## 2020-07-22 SDOH — ECONOMIC STABILITY: HOUSING INSECURITY: HOME SAFETY: RECOMMEND REMOVAL OF THROW RUGS.

## 2020-07-22 ASSESSMENT — ACTIVITIES OF DAILY LIVING (ADL)
CURRENT_FUNCTION: INDEPENDENT
TOILETING EQUIPMENT USED: GRAB BAR
DRESSING_LB_CURRENT_FUNCTION: INDEPENDENT
PHYSICAL TRANSFERS ASSESSED: 1
BATHING ASSESSED: 1
TRANSPORTATION: DEPENDENT
AMBULATION ASSISTANCE: 1
TOILETING: INDEPENDENT
GROOMING_CURRENT_FUNCTION: INDEPENDENT
TRANSPORTATION ASSESSED: 1
BATHING_CURRENT_FUNCTION: SUPERVISION
AMBULATION ASSISTANCE: INDEPENDENT
SHOPPING ASSESSED: 1
TOILETING: 1
FEEDING: INDEPENDENT
LIGHT HOUSEKEEPING: INDEPENDENT
DRESSING_UB_CURRENT_FUNCTION: INDEPENDENT
FEEDING ASSESSED: 1
HOUSEKEEPING ASSESSED: 1
PREPARING MEALS: INDEPENDENT
SHOPPING: NEEDS ASSISTANCE
FEEDING_WITHIN_DEFINED_LIMITS: 1
GROOMING ASSESSED: 1

## 2020-07-23 ENCOUNTER — HOME CARE VISIT (OUTPATIENT)
Dept: HOME HEALTH SERVICES | Facility: HOME HEALTHCARE | Age: 85
End: 2020-07-23
Payer: MEDICARE

## 2020-07-23 VITALS
SYSTOLIC BLOOD PRESSURE: 108 MMHG | DIASTOLIC BLOOD PRESSURE: 78 MMHG | OXYGEN SATURATION: 99 % | RESPIRATION RATE: 18 BRPM | TEMPERATURE: 98.8 F | HEART RATE: 83 BPM

## 2020-07-23 PROCEDURE — G0155 HHCP-SVS OF CSW,EA 15 MIN: HCPCS

## 2020-07-27 ENCOUNTER — HOME CARE VISIT (OUTPATIENT)
Dept: HOME HEALTH SERVICES | Facility: HOME HEALTHCARE | Age: 85
End: 2020-07-27
Payer: MEDICARE

## 2020-07-27 VITALS
SYSTOLIC BLOOD PRESSURE: 110 MMHG | TEMPERATURE: 98.3 F | HEART RATE: 65 BPM | DIASTOLIC BLOOD PRESSURE: 68 MMHG | OXYGEN SATURATION: 98 % | RESPIRATION RATE: 18 BRPM

## 2020-07-27 VITALS
TEMPERATURE: 98.3 F | OXYGEN SATURATION: 98 % | HEART RATE: 65 BPM | SYSTOLIC BLOOD PRESSURE: 110 MMHG | DIASTOLIC BLOOD PRESSURE: 68 MMHG | RESPIRATION RATE: 18 BRPM

## 2020-07-27 PROCEDURE — G0152 HHCP-SERV OF OT,EA 15 MIN: HCPCS

## 2020-07-27 PROCEDURE — G0151 HHCP-SERV OF PT,EA 15 MIN: HCPCS

## 2020-07-27 ASSESSMENT — BALANCE ASSESSMENTS
IMMEDIATE STANDING BALANCE FIRST 5 SECONDS: 2 - STEADY WITHOUT WALKER OR OTHER SUPPORT
TURNING 360 DEGREES STEPS: 1 - CONTINUOUS STEPS
BALANCE SCORE: 14
NUDGED: 2 - STEADY
ARISING SCORE: 2
SITTING BALANCE: 1 - STEADY, SAFE
SITTING DOWN: 1 - USES ARMS OR NOT SMOOTH MOTION
STANDING BALANCE: 1 - STEADY BUT WIDE STANCE AND USES CANE OR OTHER SUPPORT
ATTEMPTS TO ARISE: 2 - ABLE TO RISE, ONE ATTEMPT
EYES CLOSED AT MAXIMUM POSITION NUDGED: 1 - STEADY
ARISES: 2 - ABLE WITHOUT USING ARMS
NUDGED SCORE: 2

## 2020-07-27 ASSESSMENT — GAIT ASSESSMENTS
INITIATION OF GAIT IMMEDIATELY AFTER GO: 1 - NO HESITANCY
TRUNK: 1 - NO SWAY BUT FLEXION OF KNEES OR BACK OR SPREADS ARMS WHILE WALKING
PATH: 1 - MILD/MODERATE DEVIATION OR USES WALKING AID
STEP SYMMETRY: 1 - RIGHT AND LEFT STEP LENGTH APPEAR EQUAL
STEP CONTINUITY: 1 - STEPS APPEAR CONTINUOUS
BALANCE AND GAIT SCORE: 24
TRUNK SCORE: 1
WALKING STANCE: 1 - HEELS ALMOST TOUCHING WHILE WALKING
GAIT SCORE: 10
PATH SCORE: 1

## 2020-07-29 ENCOUNTER — HOME CARE VISIT (OUTPATIENT)
Dept: HOME HEALTH SERVICES | Facility: HOME HEALTHCARE | Age: 85
End: 2020-07-29
Payer: MEDICARE

## 2020-07-29 VITALS
HEART RATE: 68 BPM | RESPIRATION RATE: 16 BRPM | DIASTOLIC BLOOD PRESSURE: 68 MMHG | SYSTOLIC BLOOD PRESSURE: 108 MMHG | OXYGEN SATURATION: 98 % | TEMPERATURE: 99.4 F

## 2020-07-29 PROCEDURE — G0493 RN CARE EA 15 MIN HH/HOSPICE: HCPCS

## 2020-07-29 ASSESSMENT — ENCOUNTER SYMPTOMS: MUSCLE WEAKNESS: 1

## 2020-07-30 ENCOUNTER — HOME CARE VISIT (OUTPATIENT)
Dept: HOME HEALTH SERVICES | Facility: HOME HEALTHCARE | Age: 85
End: 2020-07-30
Payer: MEDICARE

## 2020-08-03 ENCOUNTER — HOME CARE VISIT (OUTPATIENT)
Dept: HOME HEALTH SERVICES | Facility: HOME HEALTHCARE | Age: 85
End: 2020-08-03
Payer: MEDICARE

## 2020-08-03 VITALS
TEMPERATURE: 98.2 F | DIASTOLIC BLOOD PRESSURE: 65 MMHG | OXYGEN SATURATION: 98 % | RESPIRATION RATE: 18 BRPM | HEART RATE: 75 BPM | SYSTOLIC BLOOD PRESSURE: 110 MMHG

## 2020-08-03 PROCEDURE — G0152 HHCP-SERV OF OT,EA 15 MIN: HCPCS

## 2020-08-05 ENCOUNTER — HOME CARE VISIT (OUTPATIENT)
Dept: HOME HEALTH SERVICES | Facility: HOME HEALTHCARE | Age: 85
End: 2020-08-05
Payer: MEDICARE

## 2020-08-05 PROCEDURE — G0493 RN CARE EA 15 MIN HH/HOSPICE: HCPCS

## 2020-08-06 VITALS
DIASTOLIC BLOOD PRESSURE: 82 MMHG | RESPIRATION RATE: 18 BRPM | HEART RATE: 68 BPM | TEMPERATURE: 99 F | OXYGEN SATURATION: 97 % | SYSTOLIC BLOOD PRESSURE: 138 MMHG

## 2020-08-06 ASSESSMENT — ACTIVITIES OF DAILY LIVING (ADL)
HOME_HEALTH_OASIS: 00
OASIS_M1830: 01

## 2020-08-06 ASSESSMENT — PATIENT HEALTH QUESTIONNAIRE - PHQ9: CLINICAL INTERPRETATION OF PHQ2 SCORE: 0

## 2021-01-01 ENCOUNTER — HOSPITAL ENCOUNTER (EMERGENCY)
Facility: MEDICAL CENTER | Age: 86
End: 2021-11-26
Attending: EMERGENCY MEDICINE
Payer: MEDICARE

## 2021-01-01 ENCOUNTER — APPOINTMENT (OUTPATIENT)
Dept: RADIOLOGY | Facility: MEDICAL CENTER | Age: 86
DRG: 065 | End: 2021-01-01
Attending: EMERGENCY MEDICINE
Payer: MEDICARE

## 2021-01-01 ENCOUNTER — HOSPITAL ENCOUNTER (EMERGENCY)
Facility: MEDICAL CENTER | Age: 86
End: 2021-06-10
Attending: EMERGENCY MEDICINE | Admitting: EMERGENCY MEDICINE
Payer: MEDICARE

## 2021-01-01 ENCOUNTER — APPOINTMENT (OUTPATIENT)
Dept: RADIOLOGY | Facility: IMAGING CENTER | Age: 86
End: 2021-01-01
Attending: FAMILY MEDICINE
Payer: MEDICARE

## 2021-01-01 ENCOUNTER — HOME CARE VISIT (OUTPATIENT)
Dept: HOME HEALTH SERVICES | Facility: HOME HEALTHCARE | Age: 86
End: 2021-01-01
Payer: MEDICARE

## 2021-01-01 ENCOUNTER — OFFICE VISIT (OUTPATIENT)
Dept: URGENT CARE | Facility: CLINIC | Age: 86
End: 2021-01-01
Payer: MEDICARE

## 2021-01-01 ENCOUNTER — HOSPITAL ENCOUNTER (INPATIENT)
Facility: MEDICAL CENTER | Age: 86
LOS: 3 days | DRG: 065 | End: 2021-12-28
Attending: EMERGENCY MEDICINE | Admitting: HOSPITALIST
Payer: MEDICARE

## 2021-01-01 ENCOUNTER — APPOINTMENT (OUTPATIENT)
Dept: RADIOLOGY | Facility: MEDICAL CENTER | Age: 86
DRG: 065 | End: 2021-01-01
Attending: INTERNAL MEDICINE
Payer: MEDICARE

## 2021-01-01 ENCOUNTER — TELEPHONE (OUTPATIENT)
Dept: HEALTH INFORMATION MANAGEMENT | Facility: OTHER | Age: 86
End: 2021-01-01

## 2021-01-01 ENCOUNTER — PATIENT MESSAGE (OUTPATIENT)
Dept: HEALTH INFORMATION MANAGEMENT | Facility: OTHER | Age: 86
End: 2021-01-01

## 2021-01-01 ENCOUNTER — OFFICE VISIT (OUTPATIENT)
Dept: URGENT CARE | Facility: PHYSICIAN GROUP | Age: 86
End: 2021-01-01
Payer: MEDICARE

## 2021-01-01 ENCOUNTER — APPOINTMENT (OUTPATIENT)
Dept: CARDIOLOGY | Facility: MEDICAL CENTER | Age: 86
DRG: 065 | End: 2021-01-01
Attending: HOSPITALIST
Payer: MEDICARE

## 2021-01-01 ENCOUNTER — APPOINTMENT (OUTPATIENT)
Dept: RADIOLOGY | Facility: MEDICAL CENTER | Age: 86
End: 2021-01-01
Attending: EMERGENCY MEDICINE
Payer: MEDICARE

## 2021-01-01 ENCOUNTER — NON-PROVIDER VISIT (OUTPATIENT)
Dept: CARDIOLOGY | Facility: MEDICAL CENTER | Age: 86
End: 2021-01-01
Payer: MEDICARE

## 2021-01-01 ENCOUNTER — HOME HEALTH ADMISSION (OUTPATIENT)
Dept: HOME HEALTH SERVICES | Facility: HOME HEALTHCARE | Age: 86
End: 2021-01-01
Payer: MEDICARE

## 2021-01-01 VITALS
SYSTOLIC BLOOD PRESSURE: 121 MMHG | WEIGHT: 101.41 LBS | HEIGHT: 61 IN | TEMPERATURE: 98 F | DIASTOLIC BLOOD PRESSURE: 81 MMHG | HEART RATE: 68 BPM | OXYGEN SATURATION: 98 % | BODY MASS INDEX: 19.15 KG/M2 | RESPIRATION RATE: 16 BRPM

## 2021-01-01 VITALS
TEMPERATURE: 97.7 F | BODY MASS INDEX: 18.13 KG/M2 | HEART RATE: 74 BPM | SYSTOLIC BLOOD PRESSURE: 124 MMHG | HEIGHT: 59 IN | RESPIRATION RATE: 16 BRPM | OXYGEN SATURATION: 99 % | DIASTOLIC BLOOD PRESSURE: 78 MMHG | WEIGHT: 89.95 LBS

## 2021-01-01 VITALS
OXYGEN SATURATION: 92 % | DIASTOLIC BLOOD PRESSURE: 60 MMHG | HEIGHT: 61 IN | TEMPERATURE: 97.9 F | SYSTOLIC BLOOD PRESSURE: 100 MMHG | RESPIRATION RATE: 16 BRPM | HEART RATE: 87 BPM | WEIGHT: 112 LBS | BODY MASS INDEX: 21.14 KG/M2

## 2021-01-01 VITALS
SYSTOLIC BLOOD PRESSURE: 136 MMHG | WEIGHT: 110.23 LBS | DIASTOLIC BLOOD PRESSURE: 75 MMHG | HEIGHT: 61 IN | BODY MASS INDEX: 20.81 KG/M2 | TEMPERATURE: 97 F | HEART RATE: 80 BPM | OXYGEN SATURATION: 97 % | RESPIRATION RATE: 16 BRPM

## 2021-01-01 VITALS
HEART RATE: 90 BPM | SYSTOLIC BLOOD PRESSURE: 122 MMHG | HEIGHT: 61 IN | DIASTOLIC BLOOD PRESSURE: 64 MMHG | RESPIRATION RATE: 14 BRPM | OXYGEN SATURATION: 98 % | TEMPERATURE: 96.9 F | WEIGHT: 112 LBS | BODY MASS INDEX: 21.14 KG/M2

## 2021-01-01 VITALS
DIASTOLIC BLOOD PRESSURE: 62 MMHG | OXYGEN SATURATION: 96 % | HEART RATE: 62 BPM | TEMPERATURE: 98.5 F | SYSTOLIC BLOOD PRESSURE: 108 MMHG | RESPIRATION RATE: 12 BRPM

## 2021-01-01 DIAGNOSIS — R11.0 NAUSEA: ICD-10-CM

## 2021-01-01 DIAGNOSIS — N17.9 AKI (ACUTE KIDNEY INJURY) (HCC): ICD-10-CM

## 2021-01-01 DIAGNOSIS — R53.81 MALAISE AND FATIGUE: ICD-10-CM

## 2021-01-01 DIAGNOSIS — M54.9 DORSALGIA: ICD-10-CM
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
## 2021-01-01 DIAGNOSIS — I63.40 CEREBROVASCULAR ACCIDENT (CVA) DUE TO EMBOLISM OF CEREBRAL ARTERY (HCC): ICD-10-CM

## 2021-01-01 DIAGNOSIS — R51.9 CHRONIC NONINTRACTABLE HEADACHE, UNSPECIFIED HEADACHE TYPE: ICD-10-CM

## 2021-01-01 DIAGNOSIS — I10 ESSENTIAL HYPERTENSION: ICD-10-CM

## 2021-01-01 DIAGNOSIS — Z86.69 HISTORY OF MIGRAINE: ICD-10-CM

## 2021-01-01 DIAGNOSIS — R53.83 MALAISE AND FATIGUE: ICD-10-CM

## 2021-01-01 DIAGNOSIS — I63.10 CEREBROVASCULAR ACCIDENT (CVA) DUE TO EMBOLISM OF PRECEREBRAL ARTERY (HCC): ICD-10-CM

## 2021-01-01 DIAGNOSIS — R09.89 SUSPECTED CEREBROVASCULAR ACCIDENT (CVA): ICD-10-CM

## 2021-01-01 DIAGNOSIS — G89.29 CHRONIC NONINTRACTABLE HEADACHE, UNSPECIFIED HEADACHE TYPE: ICD-10-CM

## 2021-01-01 DIAGNOSIS — F03.90 DEMENTIA WITHOUT BEHAVIORAL DISTURBANCE, UNSPECIFIED DEMENTIA TYPE: ICD-10-CM

## 2021-01-01 DIAGNOSIS — I50.9 CONGESTIVE HEART FAILURE, UNSPECIFIED HF CHRONICITY, UNSPECIFIED HEART FAILURE TYPE (HCC): ICD-10-CM

## 2021-01-01 DIAGNOSIS — S09.90XA CLOSED HEAD INJURY, INITIAL ENCOUNTER: ICD-10-CM

## 2021-01-01 DIAGNOSIS — I48.91 ATRIAL FIBRILLATION WITH RVR (HCC): ICD-10-CM

## 2021-01-01 LAB
ABO + RH BLD: NORMAL
ABO GROUP BLD: NORMAL
ALBUMIN SERPL BCP-MCNC: 2.9 G/DL (ref 3.2–4.9)
ALBUMIN SERPL BCP-MCNC: 3.1 G/DL (ref 3.2–4.9)
ALBUMIN/GLOB SERPL: 0.7 G/DL
ALBUMIN/GLOB SERPL: 0.8 G/DL
ALP SERPL-CCNC: 72 U/L (ref 30–99)
ALP SERPL-CCNC: 78 U/L (ref 30–99)
ALT SERPL-CCNC: 5 U/L (ref 2–50)
ALT SERPL-CCNC: <5 U/L (ref 2–50)
ANION GAP SERPL CALC-SCNC: 14 MMOL/L (ref 7–16)
ANION GAP SERPL CALC-SCNC: 15 MMOL/L (ref 7–16)
ANION GAP SERPL CALC-SCNC: 15 MMOL/L (ref 7–16)
ANION GAP SERPL CALC-SCNC: 17 MMOL/L (ref 7–16)
APTT PPP: 35.1 SEC (ref 24.7–36)
AST SERPL-CCNC: 13 U/L (ref 12–45)
AST SERPL-CCNC: 14 U/L (ref 12–45)
BASOPHILS # BLD AUTO: 0.4 % (ref 0–1.8)
BASOPHILS # BLD AUTO: 0.4 % (ref 0–1.8)
BASOPHILS # BLD: 0.04 K/UL (ref 0–0.12)
BASOPHILS # BLD: 0.05 K/UL (ref 0–0.12)
BILIRUB SERPL-MCNC: 0.3 MG/DL (ref 0.1–1.5)
BILIRUB SERPL-MCNC: 0.4 MG/DL (ref 0.1–1.5)
BLD GP AB SCN SERPL QL: NORMAL
BUN SERPL-MCNC: 50 MG/DL (ref 8–22)
BUN SERPL-MCNC: 51 MG/DL (ref 8–22)
BUN SERPL-MCNC: 53 MG/DL (ref 8–22)
BUN SERPL-MCNC: 57 MG/DL (ref 8–22)
CALCIUM SERPL-MCNC: 8.3 MG/DL (ref 8.4–10.2)
CALCIUM SERPL-MCNC: 8.5 MG/DL (ref 8.4–10.2)
CALCIUM SERPL-MCNC: 8.8 MG/DL (ref 8.4–10.2)
CALCIUM SERPL-MCNC: 9.1 MG/DL (ref 8.4–10.2)
CHLORIDE SERPL-SCNC: 101 MMOL/L (ref 96–112)
CHLORIDE SERPL-SCNC: 103 MMOL/L (ref 96–112)
CHLORIDE SERPL-SCNC: 104 MMOL/L (ref 96–112)
CHLORIDE SERPL-SCNC: 104 MMOL/L (ref 96–112)
CHOLEST SERPL-MCNC: 183 MG/DL (ref 100–199)
CO2 SERPL-SCNC: 17 MMOL/L (ref 20–33)
CO2 SERPL-SCNC: 19 MMOL/L (ref 20–33)
CO2 SERPL-SCNC: 19 MMOL/L (ref 20–33)
CO2 SERPL-SCNC: 20 MMOL/L (ref 20–33)
CREAT SERPL-MCNC: 2.73 MG/DL (ref 0.5–1.4)
CREAT SERPL-MCNC: 3.04 MG/DL (ref 0.5–1.4)
CREAT SERPL-MCNC: 3.04 MG/DL (ref 0.5–1.4)
CREAT SERPL-MCNC: 3.23 MG/DL (ref 0.5–1.4)
EKG IMPRESSION: NORMAL
EOSINOPHIL # BLD AUTO: 0.05 K/UL (ref 0–0.51)
EOSINOPHIL # BLD AUTO: 0.07 K/UL (ref 0–0.51)
EOSINOPHIL NFR BLD: 0.5 % (ref 0–6.9)
EOSINOPHIL NFR BLD: 0.6 % (ref 0–6.9)
ERYTHROCYTE [DISTWIDTH] IN BLOOD BY AUTOMATED COUNT: 52.3 FL (ref 35.9–50)
ERYTHROCYTE [DISTWIDTH] IN BLOOD BY AUTOMATED COUNT: 54.3 FL (ref 35.9–50)
ERYTHROCYTE [DISTWIDTH] IN BLOOD BY AUTOMATED COUNT: 55.4 FL (ref 35.9–50)
ERYTHROCYTE [DISTWIDTH] IN BLOOD BY AUTOMATED COUNT: 55.6 FL (ref 35.9–50)
EST. AVERAGE GLUCOSE BLD GHB EST-MCNC: 120 MG/DL
GLOBULIN SER CALC-MCNC: 3.9 G/DL (ref 1.9–3.5)
GLOBULIN SER CALC-MCNC: 4 G/DL (ref 1.9–3.5)
GLUCOSE SERPL-MCNC: 114 MG/DL (ref 65–99)
GLUCOSE SERPL-MCNC: 115 MG/DL (ref 65–99)
GLUCOSE SERPL-MCNC: 88 MG/DL (ref 65–99)
GLUCOSE SERPL-MCNC: 89 MG/DL (ref 65–99)
HBA1C MFR BLD: 5.8 % (ref 4–5.6)
HCT VFR BLD AUTO: 28 % (ref 37–47)
HCT VFR BLD AUTO: 30.2 % (ref 37–47)
HCT VFR BLD AUTO: 30.9 % (ref 37–47)
HCT VFR BLD AUTO: 36.1 % (ref 37–47)
HDLC SERPL-MCNC: 41 MG/DL
HGB BLD-MCNC: 11 G/DL (ref 12–16)
HGB BLD-MCNC: 9.1 G/DL (ref 12–16)
HGB BLD-MCNC: 9.4 G/DL (ref 12–16)
HGB BLD-MCNC: 9.6 G/DL (ref 12–16)
IMM GRANULOCYTES # BLD AUTO: 0.05 K/UL (ref 0–0.11)
IMM GRANULOCYTES # BLD AUTO: 0.08 K/UL (ref 0–0.11)
IMM GRANULOCYTES NFR BLD AUTO: 0.4 % (ref 0–0.9)
IMM GRANULOCYTES NFR BLD AUTO: 0.8 % (ref 0–0.9)
INR PPP: 1.16 (ref 0.87–1.13)
IRON SATN MFR SERPL: 11 % (ref 15–55)
IRON SERPL-MCNC: 17 UG/DL (ref 40–170)
LDLC SERPL CALC-MCNC: 118 MG/DL
LIPASE SERPL-CCNC: 32 U/L (ref 7–58)
LV EJECT FRACT  99904: 70
LV EJECT FRACT MOD 2C 99903: 63.92
LV EJECT FRACT MOD 4C 99902: 67.84
LV EJECT FRACT MOD BP 99901: 66.33
LYMPHOCYTES # BLD AUTO: 2.01 K/UL (ref 1–4.8)
LYMPHOCYTES # BLD AUTO: 2.12 K/UL (ref 1–4.8)
LYMPHOCYTES NFR BLD: 17.5 % (ref 22–41)
LYMPHOCYTES NFR BLD: 20 % (ref 22–41)
MAGNESIUM SERPL-MCNC: 2.4 MG/DL (ref 1.5–2.5)
MAGNESIUM SERPL-MCNC: 2.5 MG/DL (ref 1.5–2.5)
MAGNESIUM SERPL-MCNC: 2.6 MG/DL (ref 1.5–2.5)
MCH RBC QN AUTO: 27.8 PG (ref 27–33)
MCH RBC QN AUTO: 28.2 PG (ref 27–33)
MCH RBC QN AUTO: 28.2 PG (ref 27–33)
MCH RBC QN AUTO: 28.5 PG (ref 27–33)
MCHC RBC AUTO-ENTMCNC: 30.5 G/DL (ref 33.6–35)
MCHC RBC AUTO-ENTMCNC: 31.1 G/DL (ref 33.6–35)
MCHC RBC AUTO-ENTMCNC: 31.1 G/DL (ref 33.6–35)
MCHC RBC AUTO-ENTMCNC: 32.5 G/DL (ref 33.6–35)
MCV RBC AUTO: 87.8 FL (ref 81.4–97.8)
MCV RBC AUTO: 90.7 FL (ref 81.4–97.8)
MCV RBC AUTO: 90.9 FL (ref 81.4–97.8)
MCV RBC AUTO: 91.2 FL (ref 81.4–97.8)
MONOCYTES # BLD AUTO: 0.75 K/UL (ref 0–0.85)
MONOCYTES # BLD AUTO: 0.77 K/UL (ref 0–0.85)
MONOCYTES NFR BLD AUTO: 6.5 % (ref 0–13.4)
MONOCYTES NFR BLD AUTO: 7.3 % (ref 0–13.4)
NEUTROPHILS # BLD AUTO: 7.55 K/UL (ref 2–7.15)
NEUTROPHILS # BLD AUTO: 8.57 K/UL (ref 2–7.15)
NEUTROPHILS NFR BLD: 71 % (ref 44–72)
NEUTROPHILS NFR BLD: 74.6 % (ref 44–72)
NRBC # BLD AUTO: 0 K/UL
NRBC # BLD AUTO: 0 K/UL
NRBC BLD-RTO: 0 /100 WBC
NRBC BLD-RTO: 0 /100 WBC
NT-PROBNP SERPL IA-MCNC: 8342 PG/ML (ref 0–125)
NT-PROBNP SERPL IA-MCNC: ABNORMAL PG/ML (ref 0–125)
OSMOLALITY SERPL: 302 MOSM/KG H2O (ref 278–298)
PLATELET # BLD AUTO: 356 K/UL (ref 164–446)
PLATELET # BLD AUTO: 361 K/UL (ref 164–446)
PLATELET # BLD AUTO: 375 K/UL (ref 164–446)
PLATELET # BLD AUTO: 391 K/UL (ref 164–446)
PMV BLD AUTO: 9.4 FL (ref 9–12.9)
PMV BLD AUTO: 9.5 FL (ref 9–12.9)
POTASSIUM SERPL-SCNC: 4.2 MMOL/L (ref 3.6–5.5)
POTASSIUM SERPL-SCNC: 4.3 MMOL/L (ref 3.6–5.5)
POTASSIUM SERPL-SCNC: 4.4 MMOL/L (ref 3.6–5.5)
POTASSIUM SERPL-SCNC: 4.6 MMOL/L (ref 3.6–5.5)
PROT SERPL-MCNC: 6.8 G/DL (ref 6–8.2)
PROT SERPL-MCNC: 7.1 G/DL (ref 6–8.2)
PROTHROMBIN TIME: 13.9 SEC (ref 12–14.6)
RBC # BLD AUTO: 3.19 M/UL (ref 4.2–5.4)
RBC # BLD AUTO: 3.33 M/UL (ref 4.2–5.4)
RBC # BLD AUTO: 3.4 M/UL (ref 4.2–5.4)
RBC # BLD AUTO: 3.96 M/UL (ref 4.2–5.4)
RH BLD: NORMAL
SODIUM SERPL-SCNC: 135 MMOL/L (ref 135–145)
SODIUM SERPL-SCNC: 137 MMOL/L (ref 135–145)
SODIUM SERPL-SCNC: 138 MMOL/L (ref 135–145)
SODIUM SERPL-SCNC: 138 MMOL/L (ref 135–145)
TIBC SERPL-MCNC: 159 UG/DL (ref 250–450)
TRIGL SERPL-MCNC: 119 MG/DL (ref 0–149)
TROPONIN T SERPL-MCNC: 47 NG/L (ref 6–19)
TROPONIN T SERPL-MCNC: 53 NG/L (ref 6–19)
TROPONIN T SERPL-MCNC: 55 NG/L (ref 6–19)
UIBC SERPL-MCNC: 142 UG/DL (ref 110–370)
WBC # BLD AUTO: 10.6 K/UL (ref 4.8–10.8)
WBC # BLD AUTO: 11.5 K/UL (ref 4.8–10.8)
WBC # BLD AUTO: 7.8 K/UL (ref 4.8–10.8)
WBC # BLD AUTO: 8.3 K/UL (ref 4.8–10.8)

## 2021-01-01 PROCEDURE — 36415 COLL VENOUS BLD VENIPUNCTURE: CPT

## 2021-01-01 PROCEDURE — G0151 HHCP-SERV OF PT,EA 15 MIN: HCPCS

## 2021-01-01 PROCEDURE — 97166 OT EVAL MOD COMPLEX 45 MIN: CPT

## 2021-01-01 PROCEDURE — 700111 HCHG RX REV CODE 636 W/ 250 OVERRIDE (IP): Performed by: HOSPITALIST

## 2021-01-01 PROCEDURE — 99233 SBSQ HOSP IP/OBS HIGH 50: CPT | Performed by: INTERNAL MEDICINE

## 2021-01-01 PROCEDURE — 700102 HCHG RX REV CODE 250 W/ 637 OVERRIDE(OP): Performed by: INTERNAL MEDICINE

## 2021-01-01 PROCEDURE — 83735 ASSAY OF MAGNESIUM: CPT

## 2021-01-01 PROCEDURE — 93306 TTE W/DOPPLER COMPLETE: CPT | Mod: 26 | Performed by: INTERNAL MEDICINE

## 2021-01-01 PROCEDURE — 99214 OFFICE O/P EST MOD 30 MIN: CPT | Performed by: NURSE PRACTITIONER

## 2021-01-01 PROCEDURE — 83550 IRON BINDING TEST: CPT

## 2021-01-01 PROCEDURE — 97162 PT EVAL MOD COMPLEX 30 MIN: CPT

## 2021-01-01 PROCEDURE — 93880 EXTRACRANIAL BILAT STUDY: CPT

## 2021-01-01 PROCEDURE — 86850 RBC ANTIBODY SCREEN: CPT

## 2021-01-01 PROCEDURE — 700111 HCHG RX REV CODE 636 W/ 250 OVERRIDE (IP): Performed by: EMERGENCY MEDICINE

## 2021-01-01 PROCEDURE — 83690 ASSAY OF LIPASE: CPT

## 2021-01-01 PROCEDURE — 86901 BLOOD TYPING SEROLOGIC RH(D): CPT

## 2021-01-01 PROCEDURE — 70551 MRI BRAIN STEM W/O DYE: CPT | Mod: ME

## 2021-01-01 PROCEDURE — 85610 PROTHROMBIN TIME: CPT

## 2021-01-01 PROCEDURE — A9270 NON-COVERED ITEM OR SERVICE: HCPCS | Performed by: EMERGENCY MEDICINE

## 2021-01-01 PROCEDURE — 84484 ASSAY OF TROPONIN QUANT: CPT

## 2021-01-01 PROCEDURE — 83880 ASSAY OF NATRIURETIC PEPTIDE: CPT

## 2021-01-01 PROCEDURE — 80061 LIPID PANEL: CPT

## 2021-01-01 PROCEDURE — 83036 HEMOGLOBIN GLYCOSYLATED A1C: CPT

## 2021-01-01 PROCEDURE — 93306 TTE W/DOPPLER COMPLETE: CPT

## 2021-01-01 PROCEDURE — 70450 CT HEAD/BRAIN W/O DYE: CPT | Mod: MG

## 2021-01-01 PROCEDURE — 80048 BASIC METABOLIC PNL TOTAL CA: CPT

## 2021-01-01 PROCEDURE — 99239 HOSP IP/OBS DSCHRG MGMT >30: CPT | Performed by: INTERNAL MEDICINE

## 2021-01-01 PROCEDURE — 71045 X-RAY EXAM CHEST 1 VIEW: CPT

## 2021-01-01 PROCEDURE — 80053 COMPREHEN METABOLIC PANEL: CPT | Mod: 91

## 2021-01-01 PROCEDURE — 94760 N-INVAS EAR/PLS OXIMETRY 1: CPT

## 2021-01-01 PROCEDURE — 770020 HCHG ROOM/CARE - TELE (206)

## 2021-01-01 PROCEDURE — A9270 NON-COVERED ITEM OR SERVICE: HCPCS | Performed by: HOSPITALIST

## 2021-01-01 PROCEDURE — 85027 COMPLETE CBC AUTOMATED: CPT

## 2021-01-01 PROCEDURE — 99214 OFFICE O/P EST MOD 30 MIN: CPT | Performed by: FAMILY MEDICINE

## 2021-01-01 PROCEDURE — 83930 ASSAY OF BLOOD OSMOLALITY: CPT

## 2021-01-01 PROCEDURE — 99284 EMERGENCY DEPT VISIT MOD MDM: CPT

## 2021-01-01 PROCEDURE — 700111 HCHG RX REV CODE 636 W/ 250 OVERRIDE (IP): Performed by: INTERNAL MEDICINE

## 2021-01-01 PROCEDURE — 93005 ELECTROCARDIOGRAM TRACING: CPT

## 2021-01-01 PROCEDURE — 84484 ASSAY OF TROPONIN QUANT: CPT | Mod: 91

## 2021-01-01 PROCEDURE — A9270 NON-COVERED ITEM OR SERVICE: HCPCS | Performed by: INTERNAL MEDICINE

## 2021-01-01 PROCEDURE — 85730 THROMBOPLASTIN TIME PARTIAL: CPT

## 2021-01-01 PROCEDURE — 93005 ELECTROCARDIOGRAM TRACING: CPT | Performed by: EMERGENCY MEDICINE

## 2021-01-01 PROCEDURE — 700102 HCHG RX REV CODE 250 W/ 637 OVERRIDE(OP): Performed by: EMERGENCY MEDICINE

## 2021-01-01 PROCEDURE — 665001 SOC-HOME HEALTH

## 2021-01-01 PROCEDURE — 700102 HCHG RX REV CODE 250 W/ 637 OVERRIDE(OP): Performed by: HOSPITALIST

## 2021-01-01 PROCEDURE — 99285 EMERGENCY DEPT VISIT HI MDM: CPT

## 2021-01-01 PROCEDURE — 86900 BLOOD TYPING SEROLOGIC ABO: CPT

## 2021-01-01 PROCEDURE — 83540 ASSAY OF IRON: CPT

## 2021-01-01 PROCEDURE — 0042T CT-CEREBRAL PERFUSION ANALYSIS: CPT

## 2021-01-01 PROCEDURE — 99223 1ST HOSP IP/OBS HIGH 75: CPT | Performed by: HOSPITALIST

## 2021-01-01 PROCEDURE — 99283 EMERGENCY DEPT VISIT LOW MDM: CPT

## 2021-01-01 PROCEDURE — 85025 COMPLETE CBC W/AUTO DIFF WBC: CPT | Mod: 91

## 2021-01-01 PROCEDURE — 72125 CT NECK SPINE W/O DYE: CPT | Mod: ME

## 2021-01-01 PROCEDURE — 72100 X-RAY EXAM L-S SPINE 2/3 VWS: CPT | Mod: TC,FY | Performed by: RADIOLOGY

## 2021-01-01 RX ORDER — OXYCODONE HYDROCHLORIDE 5 MG/1
2.5 TABLET ORAL
Status: DISCONTINUED | OUTPATIENT
Start: 2021-01-01 | End: 2021-01-01 | Stop reason: HOSPADM

## 2021-01-01 RX ORDER — ONDANSETRON 4 MG/1
4 TABLET, ORALLY DISINTEGRATING ORAL EVERY 4 HOURS PRN
Status: DISCONTINUED | OUTPATIENT
Start: 2021-01-01 | End: 2021-01-01 | Stop reason: HOSPADM

## 2021-01-01 RX ORDER — B-COMPLEX WITH VITAMIN C
TABLET ORAL
COMMUNITY
End: 2021-01-01

## 2021-01-01 RX ORDER — AMOXICILLIN 250 MG
2 CAPSULE ORAL 2 TIMES DAILY
Status: DISCONTINUED | OUTPATIENT
Start: 2021-01-01 | End: 2021-01-01 | Stop reason: HOSPADM

## 2021-01-01 RX ORDER — ASPIRIN 81 MG/1
81 TABLET, CHEWABLE ORAL DAILY
Qty: 100 TABLET | Status: ON HOLD | COMMUNITY
Start: 2021-01-01 | End: 2022-01-01

## 2021-01-01 RX ORDER — OXYBUTYNIN CHLORIDE 5 MG/1
5 TABLET ORAL 3 TIMES DAILY
COMMUNITY
End: 2021-01-01

## 2021-01-01 RX ORDER — BISACODYL 10 MG
10 SUPPOSITORY, RECTAL RECTAL
Status: DISCONTINUED | OUTPATIENT
Start: 2021-01-01 | End: 2021-01-01 | Stop reason: HOSPADM

## 2021-01-01 RX ORDER — IBUPROFEN 200 MG
400 TABLET ORAL ONCE
Status: COMPLETED | OUTPATIENT
Start: 2021-01-01 | End: 2021-01-01

## 2021-01-01 RX ORDER — IRBESARTAN AND HYDROCHLOROTHIAZIDE 150; 12.5 MG/1; MG/1
1 TABLET, FILM COATED ORAL DAILY
COMMUNITY
End: 2021-01-01

## 2021-01-01 RX ORDER — OXYCODONE HYDROCHLORIDE 5 MG/1
5 TABLET ORAL
Status: DISCONTINUED | OUTPATIENT
Start: 2021-01-01 | End: 2021-01-01 | Stop reason: HOSPADM

## 2021-01-01 RX ORDER — ATORVASTATIN CALCIUM 40 MG/1
40 TABLET, FILM COATED ORAL EVERY EVENING
Status: DISCONTINUED | OUTPATIENT
Start: 2021-01-01 | End: 2021-01-01 | Stop reason: HOSPADM

## 2021-01-01 RX ORDER — ASPIRIN 81 MG/1
81 TABLET, CHEWABLE ORAL DAILY
Status: DISCONTINUED | OUTPATIENT
Start: 2021-01-01 | End: 2021-01-01 | Stop reason: HOSPADM

## 2021-01-01 RX ORDER — LORAZEPAM 2 MG/ML
0.5 INJECTION INTRAMUSCULAR 2 TIMES DAILY PRN
Status: DISCONTINUED | OUTPATIENT
Start: 2021-01-01 | End: 2021-01-01 | Stop reason: HOSPADM

## 2021-01-01 RX ORDER — ACETAMINOPHEN 500 MG
500 TABLET ORAL ONCE
Status: COMPLETED | OUTPATIENT
Start: 2021-01-01 | End: 2021-01-01

## 2021-01-01 RX ORDER — HEPARIN SODIUM 5000 [USP'U]/ML
5000 INJECTION, SOLUTION INTRAVENOUS; SUBCUTANEOUS EVERY 12 HOURS
Status: DISCONTINUED | OUTPATIENT
Start: 2021-01-01 | End: 2021-01-01 | Stop reason: HOSPADM

## 2021-01-01 RX ORDER — ACETAMINOPHEN 325 MG/1
650 TABLET ORAL EVERY 6 HOURS PRN
Status: DISCONTINUED | OUTPATIENT
Start: 2021-01-01 | End: 2021-01-01 | Stop reason: HOSPADM

## 2021-01-01 RX ORDER — OXYBUTYNIN CHLORIDE 5 MG/1
5 TABLET, EXTENDED RELEASE ORAL DAILY
Status: SHIPPED | COMMUNITY
End: 2021-01-01

## 2021-01-01 RX ORDER — FUROSEMIDE 10 MG/ML
20 INJECTION INTRAMUSCULAR; INTRAVENOUS
Status: DISCONTINUED | OUTPATIENT
Start: 2021-01-01 | End: 2021-01-01

## 2021-01-01 RX ORDER — ONDANSETRON 2 MG/ML
4 INJECTION INTRAMUSCULAR; INTRAVENOUS EVERY 4 HOURS PRN
Status: DISCONTINUED | OUTPATIENT
Start: 2021-01-01 | End: 2021-01-01 | Stop reason: HOSPADM

## 2021-01-01 RX ORDER — ONDANSETRON 4 MG/1
4 TABLET, ORALLY DISINTEGRATING ORAL EVERY 8 HOURS PRN
Qty: 10 TABLET | Refills: 0 | Status: SHIPPED | OUTPATIENT
Start: 2021-01-01 | End: 2021-01-01

## 2021-01-01 RX ORDER — POLYETHYLENE GLYCOL 3350 17 G/17G
1 POWDER, FOR SOLUTION ORAL
Status: DISCONTINUED | OUTPATIENT
Start: 2021-01-01 | End: 2021-01-01 | Stop reason: HOSPADM

## 2021-01-01 RX ORDER — LABETALOL HYDROCHLORIDE 5 MG/ML
10 INJECTION, SOLUTION INTRAVENOUS EVERY 4 HOURS PRN
Status: DISCONTINUED | OUTPATIENT
Start: 2021-01-01 | End: 2021-01-01 | Stop reason: HOSPADM

## 2021-01-01 RX ORDER — FUROSEMIDE 10 MG/ML
20 INJECTION INTRAMUSCULAR; INTRAVENOUS ONCE
Status: COMPLETED | OUTPATIENT
Start: 2021-01-01 | End: 2021-01-01

## 2021-01-01 RX ORDER — ONDANSETRON 4 MG/1
4 TABLET, ORALLY DISINTEGRATING ORAL ONCE
Status: COMPLETED | OUTPATIENT
Start: 2021-01-01 | End: 2021-01-01

## 2021-01-01 RX ORDER — TRAZODONE HYDROCHLORIDE 50 MG/1
50 TABLET ORAL NIGHTLY
COMMUNITY
End: 2021-01-01

## 2021-01-01 RX ORDER — AMLODIPINE BESYLATE 10 MG/1
10 TABLET ORAL DAILY
COMMUNITY
End: 2021-01-01

## 2021-01-01 RX ORDER — MORPHINE SULFATE 4 MG/ML
2 INJECTION INTRAVENOUS
Status: DISCONTINUED | OUTPATIENT
Start: 2021-01-01 | End: 2021-01-01 | Stop reason: HOSPADM

## 2021-01-01 RX ORDER — ATORVASTATIN CALCIUM 40 MG/1
40 TABLET, FILM COATED ORAL EVERY EVENING
Qty: 30 TABLET | Refills: 2 | Status: ON HOLD | OUTPATIENT
Start: 2021-01-01 | End: 2022-01-01

## 2021-01-01 RX ADMIN — LORAZEPAM 0.5 MG: 2 INJECTION INTRAMUSCULAR; INTRAVENOUS at 17:40

## 2021-01-01 RX ADMIN — IBUPROFEN 400 MG: 200 TABLET, FILM COATED ORAL at 08:09

## 2021-01-01 RX ADMIN — ONDANSETRON 4 MG: 4 TABLET, ORALLY DISINTEGRATING ORAL at 08:10

## 2021-01-01 RX ADMIN — HEPARIN SODIUM 5000 UNITS: 5000 INJECTION, SOLUTION INTRAVENOUS; SUBCUTANEOUS at 05:22

## 2021-01-01 RX ADMIN — FUROSEMIDE 20 MG: 10 INJECTION, SOLUTION INTRAMUSCULAR; INTRAVENOUS at 13:25

## 2021-01-01 RX ADMIN — FUROSEMIDE 20 MG: 10 INJECTION, SOLUTION INTRAVENOUS at 21:58

## 2021-01-01 RX ADMIN — ASPIRIN 81 MG CHEWABLE TABLET 81 MG: 81 TABLET CHEWABLE at 16:05

## 2021-01-01 RX ADMIN — HEPARIN SODIUM 5000 UNITS: 5000 INJECTION, SOLUTION INTRAVENOUS; SUBCUTANEOUS at 06:01

## 2021-01-01 RX ADMIN — FUROSEMIDE 20 MG: 10 INJECTION, SOLUTION INTRAMUSCULAR; INTRAVENOUS at 05:03

## 2021-01-01 RX ADMIN — ATORVASTATIN CALCIUM 40 MG: 40 TABLET, FILM COATED ORAL at 17:40

## 2021-01-01 RX ADMIN — HEPARIN SODIUM 5000 UNITS: 5000 INJECTION, SOLUTION INTRAVENOUS; SUBCUTANEOUS at 17:40

## 2021-01-01 RX ADMIN — SENNOSIDES AND DOCUSATE SODIUM 2 TABLET: 8.6; 5 TABLET ORAL at 05:02

## 2021-01-01 RX ADMIN — HEPARIN SODIUM 5000 UNITS: 5000 INJECTION, SOLUTION INTRAVENOUS; SUBCUTANEOUS at 05:03

## 2021-01-01 RX ADMIN — ASPIRIN 81 MG CHEWABLE TABLET 81 MG: 81 TABLET CHEWABLE at 05:22

## 2021-01-01 RX ADMIN — SENNOSIDES AND DOCUSATE SODIUM 2 TABLET: 8.6; 5 TABLET ORAL at 17:41

## 2021-01-01 RX ADMIN — ACETAMINOPHEN 500 MG: 500 TABLET ORAL at 08:10

## 2021-01-01 SDOH — ECONOMIC STABILITY: HOUSING INSECURITY
HOME SAFETY: P.T. RECOMMENDING TO FAMILY A TUB TRANSFER BENCH AND TO REMOVE GLASS DOORS TO SHOWER; ALSO RECOMMENDED COMMODE CHAIR

## 2021-01-01 SDOH — ECONOMIC STABILITY: FOOD INSECURITY: BITES PER DAY: 4

## 2021-01-01 ASSESSMENT — COGNITIVE AND FUNCTIONAL STATUS - GENERAL
TURNING FROM BACK TO SIDE WHILE IN FLAT BAD: UNABLE
EATING MEALS: A LOT
SUGGESTED CMS G CODE MODIFIER MOBILITY: CK
STANDING UP FROM CHAIR USING ARMS: TOTAL
DAILY ACTIVITIY SCORE: 12
MOVING FROM LYING ON BACK TO SITTING ON SIDE OF FLAT BED: UNABLE
MOBILITY SCORE: 6
DRESSING REGULAR UPPER BODY CLOTHING: A LOT
TOILETING: A LITTLE
PERSONAL GROOMING: A LOT
STANDING UP FROM CHAIR USING ARMS: A LITTLE
EATING MEALS: A LITTLE
CLIMB 3 TO 5 STEPS WITH RAILING: A LOT
DAILY ACTIVITIY SCORE: 18
MOVING TO AND FROM BED TO CHAIR: A LITTLE
PERSONAL GROOMING: A LITTLE
DRESSING REGULAR UPPER BODY CLOTHING: A LITTLE
TURNING FROM BACK TO SIDE WHILE IN FLAT BAD: A LITTLE
WALKING IN HOSPITAL ROOM: A LITTLE
SUGGESTED CMS G CODE MODIFIER DAILY ACTIVITY: CK
WALKING IN HOSPITAL ROOM: TOTAL
TOILETING: A LOT
HELP NEEDED FOR BATHING: A LOT
HELP NEEDED FOR BATHING: A LITTLE
DRESSING REGULAR LOWER BODY CLOTHING: A LOT
SUGGESTED CMS G CODE MODIFIER MOBILITY: CN
MOBILITY SCORE: 17
MOVING TO AND FROM BED TO CHAIR: UNABLE
DRESSING REGULAR LOWER BODY CLOTHING: A LITTLE
SUGGESTED CMS G CODE MODIFIER DAILY ACTIVITY: CL
MOVING FROM LYING ON BACK TO SITTING ON SIDE OF FLAT BED: A LITTLE
CLIMB 3 TO 5 STEPS WITH RAILING: TOTAL

## 2021-01-01 ASSESSMENT — PAIN DESCRIPTION - PAIN TYPE
TYPE: ACUTE PAIN

## 2021-01-01 ASSESSMENT — ENCOUNTER SYMPTOMS
DIFFICULTY THINKING: 1
CONSTITUTIONAL NEGATIVE: 1
MYALGIAS: 1
LOSS OF CONSCIOUSNESS: 0
CHANGE IN APPETITE: DECREASED
APPETITE LEVEL: POOR
NAUSEA: 1
WEAKNESS: 0
FOCAL WEAKNESS: 1
SHORTNESS OF BREATH: 1
SHORTNESS OF BREATH: 1
BACK PAIN: 1
PAIN: 1
POOR JUDGMENT: 1
STOOL FREQUENCY: DAILY
SPEECH CHANGE: 0
SENSORY CHANGE: 0
LOSS OF CONSCIOUSNESS: 0
FALLS: 1
SPEECH CHANGE: 0
VOMITING: DENIES
PERSON REPORTING PAIN: CAREGIVER
HYPERTENSION: 1
HEADACHES: 1
BOWEL PATTERN NORMAL: 1
FALLS: 1
NAUSEA: DENIES
SHORTNESS OF BREATH: T
VOMITING: 0
FOCAL WEAKNESS: 1
WEAKNESS: 1
WEAKNESS: 1
LAST BOWEL MOVEMENT: 66109
FALLS: 1

## 2021-01-01 ASSESSMENT — PATIENT HEALTH QUESTIONNAIRE - PHQ9
2. FEELING DOWN, DEPRESSED, IRRITABLE, OR HOPELESS: NOT AT ALL
2. FEELING DOWN, DEPRESSED, IRRITABLE, OR HOPELESS: NOT AT ALL
SUM OF ALL RESPONSES TO PHQ9 QUESTIONS 1 AND 2: 0
SUM OF ALL RESPONSES TO PHQ9 QUESTIONS 1 AND 2: 0
1. LITTLE INTEREST OR PLEASURE IN DOING THINGS: NOT AT ALL
1. LITTLE INTEREST OR PLEASURE IN DOING THINGS: NOT AT ALL

## 2021-01-01 ASSESSMENT — LIFESTYLE VARIABLES
TOTAL SCORE: 0
EVER FELT BAD OR GUILTY ABOUT YOUR DRINKING: NO
TOTAL SCORE: 0
ALCOHOL_USE: NO
HAVE YOU EVER FELT YOU SHOULD CUT DOWN ON YOUR DRINKING: NO
ON A TYPICAL DAY WHEN YOU DRINK ALCOHOL HOW MANY DRINKS DO YOU HAVE: 0
TOTAL SCORE: 0
HAVE PEOPLE ANNOYED YOU BY CRITICIZING YOUR DRINKING: NO
EVER HAD A DRINK FIRST THING IN THE MORNING TO STEADY YOUR NERVES TO GET RID OF A HANGOVER: NO
HOW MANY TIMES IN THE PAST YEAR HAVE YOU HAD 5 OR MORE DRINKS IN A DAY: 0
CONSUMPTION TOTAL: NEGATIVE
AVERAGE NUMBER OF DAYS PER WEEK YOU HAVE A DRINK CONTAINING ALCOHOL: 0

## 2021-01-01 ASSESSMENT — ACTIVITIES OF DAILY LIVING (ADL)
PHYSICAL TRANSFERS ASSESSED: 1
TOILETING: REQUIRES ASSIST
OASIS_M1830: 06
CURRENT_FUNCTION: ONE PERSON
TRANSPORTATION COMMENTS: PT REQUIRES ASSIST AND ASSISTIVE DEVICE TO LEAVE HOME; FALL RISK
CURRENT_FUNCTION: MODERATE ASSIST
CURRENT_FUNCTION: MAXIMUM ASSIST

## 2021-01-01 ASSESSMENT — FIBROSIS 4 INDEX
FIB4 SCORE: 2.63
FIB4 SCORE: 1.57
FIB4 SCORE: 2.63
FIB4 SCORE: 1.7
FIB4 SCORE: 1.7
FIB4 SCORE: 2.6
FIB4 SCORE: 1.64
FIB4 SCORE: 2.6
FIB4 SCORE: 2.63

## 2021-01-01 ASSESSMENT — GAIT ASSESSMENTS
GAIT LEVEL OF ASSIST: UNABLE TO PARTICIPATE
DISTANCE (FEET): 1

## 2021-01-01 ASSESSMENT — PAIN SCALES - WONG BAKER: WONGBAKER_NUMERICALRESPONSE: DOESN'T HURT AT ALL

## 2021-01-01 ASSESSMENT — PAIN SCALES - GENERAL: PAINLEVEL: 8=MODERATE-SEVERE PAIN

## 2021-01-01 ASSESSMENT — VISUAL ACUITY: OU: 1

## 2021-01-01 ASSESSMENT — PAIN DESCRIPTION - DESCRIPTORS: DESCRIPTORS: ACHING

## 2021-01-11 DIAGNOSIS — Z23 NEED FOR VACCINATION: ICD-10-CM

## 2021-01-27 ENCOUNTER — HOSPITAL ENCOUNTER (OUTPATIENT)
Dept: LAB | Facility: MEDICAL CENTER | Age: 86
End: 2021-01-27
Attending: INTERNAL MEDICINE
Payer: MEDICARE

## 2021-01-27 LAB
25(OH)D3 SERPL-MCNC: 15 NG/ML (ref 30–100)
ALBUMIN SERPL BCP-MCNC: 4 G/DL (ref 3.2–4.9)
ALBUMIN/GLOB SERPL: 1.2 G/DL
ALP SERPL-CCNC: 133 U/L (ref 30–99)
ALT SERPL-CCNC: 14 U/L (ref 2–50)
ANION GAP SERPL CALC-SCNC: 10 MMOL/L (ref 7–16)
AST SERPL-CCNC: 24 U/L (ref 12–45)
BASOPHILS # BLD AUTO: 0.8 % (ref 0–1.8)
BASOPHILS # BLD: 0.05 K/UL (ref 0–0.12)
BILIRUB SERPL-MCNC: 0.3 MG/DL (ref 0.1–1.5)
BUN SERPL-MCNC: 24 MG/DL (ref 8–22)
CALCIUM SERPL-MCNC: 9 MG/DL (ref 8.5–10.5)
CHLORIDE SERPL-SCNC: 108 MMOL/L (ref 96–112)
CO2 SERPL-SCNC: 19 MMOL/L (ref 20–33)
CREAT SERPL-MCNC: 1.43 MG/DL (ref 0.5–1.4)
EOSINOPHIL # BLD AUTO: 0.13 K/UL (ref 0–0.51)
EOSINOPHIL NFR BLD: 2 % (ref 0–6.9)
ERYTHROCYTE [DISTWIDTH] IN BLOOD BY AUTOMATED COUNT: 48 FL (ref 35.9–50)
EST. AVERAGE GLUCOSE BLD GHB EST-MCNC: 108 MG/DL
GLOBULIN SER CALC-MCNC: 3.3 G/DL (ref 1.9–3.5)
GLUCOSE SERPL-MCNC: 100 MG/DL (ref 65–99)
HBA1C MFR BLD: 5.4 % (ref 0–5.6)
HCT VFR BLD AUTO: 36.5 % (ref 37–47)
HGB BLD-MCNC: 12 G/DL (ref 12–16)
IMM GRANULOCYTES # BLD AUTO: 0.01 K/UL (ref 0–0.11)
IMM GRANULOCYTES NFR BLD AUTO: 0.2 % (ref 0–0.9)
LYMPHOCYTES # BLD AUTO: 2.52 K/UL (ref 1–4.8)
LYMPHOCYTES NFR BLD: 39.2 % (ref 22–41)
MCH RBC QN AUTO: 31.2 PG (ref 27–33)
MCHC RBC AUTO-ENTMCNC: 32.9 G/DL (ref 33.6–35)
MCV RBC AUTO: 94.8 FL (ref 81.4–97.8)
MONOCYTES # BLD AUTO: 0.39 K/UL (ref 0–0.85)
MONOCYTES NFR BLD AUTO: 6.1 % (ref 0–13.4)
NEUTROPHILS # BLD AUTO: 3.33 K/UL (ref 2–7.15)
NEUTROPHILS NFR BLD: 51.7 % (ref 44–72)
NRBC # BLD AUTO: 0 K/UL
NRBC BLD-RTO: 0 /100 WBC
PLATELET # BLD AUTO: 276 K/UL (ref 164–446)
PMV BLD AUTO: 9.5 FL (ref 9–12.9)
POTASSIUM SERPL-SCNC: 3.5 MMOL/L (ref 3.6–5.5)
PROT SERPL-MCNC: 7.3 G/DL (ref 6–8.2)
RBC # BLD AUTO: 3.85 M/UL (ref 4.2–5.4)
SODIUM SERPL-SCNC: 137 MMOL/L (ref 135–145)
T4 SERPL-MCNC: 6.2 UG/DL (ref 4–12)
TSH SERPL DL<=0.005 MIU/L-ACNC: 1.65 UIU/ML (ref 0.38–5.33)
WBC # BLD AUTO: 6.4 K/UL (ref 4.8–10.8)

## 2021-01-27 PROCEDURE — 80053 COMPREHEN METABOLIC PANEL: CPT

## 2021-01-27 PROCEDURE — 83036 HEMOGLOBIN GLYCOSYLATED A1C: CPT

## 2021-01-27 PROCEDURE — 82306 VITAMIN D 25 HYDROXY: CPT

## 2021-01-27 PROCEDURE — 84436 ASSAY OF TOTAL THYROXINE: CPT

## 2021-01-27 PROCEDURE — 36415 COLL VENOUS BLD VENIPUNCTURE: CPT

## 2021-01-27 PROCEDURE — 85025 COMPLETE CBC W/AUTO DIFF WBC: CPT

## 2021-01-27 PROCEDURE — 84443 ASSAY THYROID STIM HORMONE: CPT

## 2021-03-16 ENCOUNTER — APPOINTMENT (OUTPATIENT)
Dept: RADIOLOGY | Facility: MEDICAL CENTER | Age: 86
End: 2021-03-16
Attending: EMERGENCY MEDICINE
Payer: MEDICARE

## 2021-03-16 ENCOUNTER — HOSPITAL ENCOUNTER (EMERGENCY)
Facility: MEDICAL CENTER | Age: 86
End: 2021-03-16
Attending: EMERGENCY MEDICINE
Payer: MEDICARE

## 2021-03-16 VITALS
HEIGHT: 60 IN | TEMPERATURE: 98.2 F | OXYGEN SATURATION: 95 % | SYSTOLIC BLOOD PRESSURE: 136 MMHG | RESPIRATION RATE: 20 BRPM | DIASTOLIC BLOOD PRESSURE: 78 MMHG | HEART RATE: 82 BPM | WEIGHT: 112.43 LBS | BODY MASS INDEX: 22.07 KG/M2

## 2021-03-16 DIAGNOSIS — M54.9 MUSCULOSKELETAL BACK PAIN: ICD-10-CM

## 2021-03-16 DIAGNOSIS — E87.1 HYPONATREMIA: ICD-10-CM

## 2021-03-16 LAB
ALBUMIN SERPL BCP-MCNC: 3.6 G/DL (ref 3.2–4.9)
ALBUMIN/GLOB SERPL: 0.9 G/DL
ALP SERPL-CCNC: 100 U/L (ref 30–99)
ALT SERPL-CCNC: 11 U/L (ref 2–50)
ANION GAP SERPL CALC-SCNC: 10 MMOL/L (ref 7–16)
APPEARANCE UR: CLEAR
APTT PPP: 33.3 SEC (ref 24.7–36)
AST SERPL-CCNC: 24 U/L (ref 12–45)
BACTERIA #/AREA URNS HPF: NEGATIVE /HPF
BASOPHILS # BLD AUTO: 0.4 % (ref 0–1.8)
BASOPHILS # BLD: 0.05 K/UL (ref 0–0.12)
BILIRUB SERPL-MCNC: 0.7 MG/DL (ref 0.1–1.5)
BILIRUB UR QL STRIP.AUTO: NEGATIVE
BUN SERPL-MCNC: 27 MG/DL (ref 8–22)
CALCIUM SERPL-MCNC: 8.9 MG/DL (ref 8.5–10.5)
CHLORIDE SERPL-SCNC: 102 MMOL/L (ref 96–112)
CO2 SERPL-SCNC: 18 MMOL/L (ref 20–33)
COLOR UR: YELLOW
CREAT SERPL-MCNC: 1.09 MG/DL (ref 0.5–1.4)
EOSINOPHIL # BLD AUTO: 0.05 K/UL (ref 0–0.51)
EOSINOPHIL NFR BLD: 0.4 % (ref 0–6.9)
EPI CELLS #/AREA URNS HPF: NEGATIVE /HPF
ERYTHROCYTE [DISTWIDTH] IN BLOOD BY AUTOMATED COUNT: 45.7 FL (ref 35.9–50)
GLOBULIN SER CALC-MCNC: 4.1 G/DL (ref 1.9–3.5)
GLUCOSE SERPL-MCNC: 102 MG/DL (ref 65–99)
GLUCOSE UR STRIP.AUTO-MCNC: NEGATIVE MG/DL
HCT VFR BLD AUTO: 36.2 % (ref 37–47)
HGB BLD-MCNC: 11.4 G/DL (ref 12–16)
HYALINE CASTS #/AREA URNS LPF: NORMAL /LPF
IMM GRANULOCYTES # BLD AUTO: 0.09 K/UL (ref 0–0.11)
IMM GRANULOCYTES NFR BLD AUTO: 0.7 % (ref 0–0.9)
INR PPP: 1.07 (ref 0.87–1.13)
KETONES UR STRIP.AUTO-MCNC: NEGATIVE MG/DL
LEUKOCYTE ESTERASE UR QL STRIP.AUTO: NEGATIVE
LYMPHOCYTES # BLD AUTO: 1.88 K/UL (ref 1–4.8)
LYMPHOCYTES NFR BLD: 15.6 % (ref 22–41)
MCH RBC QN AUTO: 30.2 PG (ref 27–33)
MCHC RBC AUTO-ENTMCNC: 31.5 G/DL (ref 33.6–35)
MCV RBC AUTO: 96 FL (ref 81.4–97.8)
MICRO URNS: ABNORMAL
MONOCYTES # BLD AUTO: 0.93 K/UL (ref 0–0.85)
MONOCYTES NFR BLD AUTO: 7.7 % (ref 0–13.4)
NEUTROPHILS # BLD AUTO: 9.08 K/UL (ref 2–7.15)
NEUTROPHILS NFR BLD: 75.2 % (ref 44–72)
NITRITE UR QL STRIP.AUTO: NEGATIVE
NRBC # BLD AUTO: 0 K/UL
NRBC BLD-RTO: 0 /100 WBC
PH UR STRIP.AUTO: 5.5 [PH] (ref 5–8)
PLATELET # BLD AUTO: 256 K/UL (ref 164–446)
PMV BLD AUTO: 9.2 FL (ref 9–12.9)
POTASSIUM SERPL-SCNC: 4.8 MMOL/L (ref 3.6–5.5)
PROT SERPL-MCNC: 7.7 G/DL (ref 6–8.2)
PROT UR QL STRIP: 30 MG/DL
PROTHROMBIN TIME: 14.3 SEC (ref 12–14.6)
RBC # BLD AUTO: 3.77 M/UL (ref 4.2–5.4)
RBC # URNS HPF: NORMAL /HPF
RBC UR QL AUTO: NEGATIVE
SODIUM SERPL-SCNC: 130 MMOL/L (ref 135–145)
SP GR UR STRIP.AUTO: 1.01
UROBILINOGEN UR STRIP.AUTO-MCNC: 0.2 MG/DL
WBC # BLD AUTO: 12.1 K/UL (ref 4.8–10.8)
WBC #/AREA URNS HPF: NORMAL /HPF

## 2021-03-16 PROCEDURE — 81001 URINALYSIS AUTO W/SCOPE: CPT

## 2021-03-16 PROCEDURE — 96374 THER/PROPH/DIAG INJ IV PUSH: CPT

## 2021-03-16 PROCEDURE — 51701 INSERT BLADDER CATHETER: CPT

## 2021-03-16 PROCEDURE — 72131 CT LUMBAR SPINE W/O DYE: CPT | Mod: ME

## 2021-03-16 PROCEDURE — 85025 COMPLETE CBC W/AUTO DIFF WBC: CPT

## 2021-03-16 PROCEDURE — 36415 COLL VENOUS BLD VENIPUNCTURE: CPT

## 2021-03-16 PROCEDURE — 96375 TX/PRO/DX INJ NEW DRUG ADDON: CPT

## 2021-03-16 PROCEDURE — 85610 PROTHROMBIN TIME: CPT

## 2021-03-16 PROCEDURE — 80053 COMPREHEN METABOLIC PANEL: CPT

## 2021-03-16 PROCEDURE — 99285 EMERGENCY DEPT VISIT HI MDM: CPT

## 2021-03-16 PROCEDURE — 85730 THROMBOPLASTIN TIME PARTIAL: CPT

## 2021-03-16 PROCEDURE — 700111 HCHG RX REV CODE 636 W/ 250 OVERRIDE (IP): Performed by: EMERGENCY MEDICINE

## 2021-03-16 PROCEDURE — 71045 X-RAY EXAM CHEST 1 VIEW: CPT

## 2021-03-16 RX ORDER — MORPHINE SULFATE 4 MG/ML
2 INJECTION, SOLUTION INTRAMUSCULAR; INTRAVENOUS ONCE
Status: COMPLETED | OUTPATIENT
Start: 2021-03-16 | End: 2021-03-16

## 2021-03-16 RX ORDER — ONDANSETRON 2 MG/ML
4 INJECTION INTRAMUSCULAR; INTRAVENOUS ONCE
Status: COMPLETED | OUTPATIENT
Start: 2021-03-16 | End: 2021-03-16

## 2021-03-16 RX ORDER — CYCLOBENZAPRINE HCL 5 MG
5 TABLET ORAL 3 TIMES DAILY PRN
Qty: 20 TABLET | Refills: 0 | Status: SHIPPED | OUTPATIENT
Start: 2021-03-16 | End: 2021-01-01

## 2021-03-16 RX ADMIN — ONDANSETRON 4 MG: 2 INJECTION INTRAMUSCULAR; INTRAVENOUS at 11:45

## 2021-03-16 RX ADMIN — MORPHINE SULFATE 2 MG: 4 INJECTION INTRAVENOUS at 11:57

## 2021-03-16 ASSESSMENT — FIBROSIS 4 INDEX: FIB4 SCORE: 2.14

## 2021-03-16 ASSESSMENT — LIFESTYLE VARIABLES: DO YOU DRINK ALCOHOL: NO

## 2021-03-16 ASSESSMENT — PAIN DESCRIPTION - PAIN TYPE: TYPE: ACUTE PAIN

## 2021-03-16 NOTE — ED NOTES
Repeat green top tube drawn and sent to lab per lab request. Straight cath performed per orders using sterile technique - patient tolerated well with no complaints of discomfort. 30 mL clear yellow urine returned - sent to lab per orders. Patient assisted into position of comfort on gurney. Denies further needs. Call bell within reach. Family remains at bedside.

## 2021-03-16 NOTE — ED NOTES
Patient discharged in stable condition per orders. IV access removed - bandage applied. Wristband removed per protocol. Patient verbalized understanding of all discharge instructions. All belongings accounted for. Patient to lobby via wheelchair accompanied by ED RN and son.

## 2021-03-16 NOTE — PROGRESS NOTES
Spiritual Care Note    Patient Information     Patient's Name: Julito Mckenzie   MRN: [id number]    YOB: 1928   Age and Gender: 89 y.o. female   Service Area: ED [facility]   Room (and Bed):  17/17 SUJATA   Ethnicity or Nationality: Not listed    Primary Language: English   Christianity/Spiritual preference: None   Place of Residence: [place]   Family/Friends/Others Present: Yes, son   Clinical Team Present: No   Medical Diagnosis(-es)/Procedure(s): Generalized Body Aches   Code Status: Prior    Date of Admission: 3/16/2021   Length of Stay: 0 days      Spiritual Care Provider Information:  Name of Spiritual Care Provider: Radha Tong  Title of Spiritual Care Provider: Associate   Phone Number: [phone]  E-mail: [email or web address]  Total time : 5 minutes    Encounter/Request Information  Encounter/Request Type   Visited With: Patient and family together  Nature of the Visit: Initial, On shift  General Visit: Yes  Referral From/ Origin of Request: [place] management rounds, Verbal staff    Spiritual Assessment     Spiritual Care Encounters    Interacton/Conversation: RN referred the  to this pt, with son at the bedside, who stated that she is feeling better and looking forward to going home.    Plan: Visit Upon Request    Notes:

## 2021-03-16 NOTE — ED PROVIDER NOTES
ED Provider Note    Scribed for Hammad Oliveira M.D. by Clement Salas. 3/16/2021  11:07 AM    Primary care provider: Mike Guajardo M.D.  Means of arrival: Wheel chair  History obtained from: Patient  History limited by: None    CHIEF COMPLAINT  Chief Complaint   Patient presents with   • Generalized Body Aches     onset this am   • Shortness of Breath     worse w/ exertion       HPI  Julito Mckenzie is a 89 y.o. female who presents to the Emergency Department for acute, generalized weakness and body aches onset this morning. Patient states this morning she was unable to get out of bed due to feeling weak. She additionally describes feeling short of breath with generalized aches to her whole body, especially to her lower back. There are no known alleviating or exacerbating factors. Patient denies any associated fevers, nausea, vomiting, dysgeusia, or dysosmia. She reportedly had her first COVID vaccination 2 weeks ago.     REVIEW OF SYSTEMS  Pertinent negatives include no fevers, nausea, vomiting, dysgeusia, or dysosmia. As above, all other systems reviewed and are negative.   See HPI for further details.     PAST MEDICAL HISTORY   has a past medical history of Chronic migraine and Hypertension.    SURGICAL HISTORY  patient denies any surgical history    SOCIAL HISTORY  Social History     Tobacco Use   • Smoking status: Never Smoker   • Smokeless tobacco: Never Used   Substance Use Topics   • Alcohol use: No   • Drug use: No      Social History     Substance and Sexual Activity   Drug Use No       FAMILY HISTORY  History reviewed. No pertinent family history.    CURRENT MEDICATIONS  Home Medications     Reviewed by Paola Walker R.N. (Registered Nurse) on 03/16/21 at [facility]  Med List Status: Partial   Medication Last Dose Status   acetaminophen (TYLENOL) 650 MG CR tablet  Active   asa/apap/caffeine (EXCEDRIN EXTRA STRENGTH) 250-250-65 MG Tab  Active   doxylamine (UNISOM) 25 MG Tab tablet  Active    hydrocortisone 1 % Cream  Active   Menthol-Methyl Salicylate (BENGAY ARTHRITIS FORMULA EX)  Active   Mirabegron ER (MYRBETRIQ) 25 MG TABLET SR 24 HR  Active   Multiple Vitamins-Minerals (CENTRUM SILVER) Tab  Active                ALLERGIES  No Known Allergies    PHYSICAL EXAM  VITAL SIGNS: /78   Pulse 100   Temp 36.3 °C (97.4 °F) (Temporal)   Resp 20   Ht 1.524 m (5')   Wt 51 kg (112 lb 7 oz)   SpO2 95%   BMI 21.96 kg/m²     Constitutional: Well developed, Well nourished, No acute distress, Non-toxic appearance.   HENT: Normocephalic, Atraumatic, Bilateral external ears normal, Oropharynx is clear mucous membranes are moist. No oral exudates or nasal discharge.   Eyes: Pupils are equal round and reactive, EOMI, Conjunctiva normal, No discharge.   Neck: Normal range of motion, No tenderness, Supple, No stridor. No meningismus.  Lymphatic: No lymphadenopathy noted.   Cardiovascular: Regular rate and rhythm without murmur rub or gallop.  Thorax & Lungs: Clear breath sounds bilaterally without wheezes, rhonchi or rales. There is no chest wall tenderness.   Abdomen: Soft non-tender non-distended. There is no rebound or guarding. No organomegaly is appreciated. Bowel sounds are normal.  Skin: Normal without rash.   Back: Midlines tenderness to the upper lumbar spine.   Extremities: Intact distal pulses, No edema, No tenderness, No cyanosis, No clubbing. Capillary refill is less than 2 seconds.  Musculoskeletal: Good range of motion in all major joints. No tenderness to palpation or major deformities noted.   Neurologic: Alert & oriented x 3, Normal motor function, Normal sensory function, No focal deficits noted. Reflexes are normal.  Psychiatric: Affect normal, Judgment normal, Mood normal. There is no suicidal ideation or patient reported hallucinations.       DIAGNOSTIC STUDIES / PROCEDURES    LABS  Labs Reviewed   CBC WITH DIFFERENTIAL - Abnormal; Notable for the following components:       Result Value     WBC 12.1 (*)     RBC 3.77 (*)     Hemoglobin 11.4 (*)     Hematocrit 36.2 (*)     MCHC 31.5 (*)     Neutrophils-Polys 75.20 (*)     Lymphocytes 15.60 (*)     Neutrophils (Absolute) 9.08 (*)     Monos (Absolute) 0.93 (*)     All other components within normal limits    Narrative:     Indicate which anticoagulants the patient is on:->NONE   URINALYSIS - Abnormal; Notable for the following components:    Protein 30 (*)     All other components within normal limits   APTT    Narrative:     Indicate which anticoagulants the patient is on:->NONE   PROTHROMBIN TIME    Narrative:     Indicate which anticoagulants the patient is on:->NONE   COMP METABOLIC PANEL    Narrative:     Recollect: Specimen hemolyzed.  Notified: [id number]   URINE MICROSCOPIC (W/UA)      All labs reviewed by me.    RADIOLOGY  DX-CHEST-PORTABLE (1 VIEW)   Final Result      No acute cardiopulmonary process is seen.      Stable mild cardiomegaly.      Atherosclerotic plaque.         CT-LSPINE W/O PLUS RECONS   Final Result      1.  There is no acute fracture or malalignment of the lumbar spine.   2.  There is osteopenia.   3.  There is degenerative disc disease and arthropathy most prominent in the lower levels of the lumbar spine with no canal stenosis.        The radiologist's interpretation of all radiological studies have been reviewed by me.    COURSE & MEDICAL DECISION MAKING  Nursing notes, VS, PMSFHx reviewed in chart.    11:07 AM Patient seen and examined at bedside. Ordered for CT-LSpine w/o, DX-chest, CBC w/ diff, CMP, APTT, PTT to evaluate. Patient was treated with morphine 4 mg and Zofran 4 mg for her symptoms.      There is mild leukocytosis with no significant shift.      12:42 PM Imaging was negative for acute spinal fracture; UA ordered. Urinalysis is unremarkable.    1:53 PM Patient was reevaluated at bedside. Discussed lab and radiology results with the patient and informed them they are reassuring. Awaiting CMP results.  The CMP was  unfortunately hemolyzed multiple times and we did a final draw at 1424 hrs. for analysis    After a number of significant delays the metabolic panel returned as mildly hyponatremic at 130 but no other significant abnormalities.  I do not think this is the cause of her lower back pain and suggest that she has a visit with her physician in follow-up to discuss    The patient will return for new or worsening symptoms and is stable at the time of discharge.    DISPOSITION:  Patient will be discharged home in stable condition.  She will take Flexeril as needed for pain and spasm at home follow-up with her doctor if she is in functional decline    FINAL IMPRESSION  1. Musculoskeletal back pain    2. Hyponatremia          Clement ORTEGA (Scribe), am scribing for, and in the presence of, Hammad Oliveira M.D..    Electronically signed by: Clement Salas (Juli), 3/16/2021    Hammad ORTEGA M.D. personally performed the services described in this documentation, as scribed by Clement Salas in my presence, and it is both accurate and complete.    The note accurately reflects work and decisions made by me.  Hammad Oliveira M.D.  3/16/2021  2:10 PM

## 2021-03-16 NOTE — ED NOTES
"Report received from DEANNA Paulson.    Patient resting comfortably on gurney. No acute distress. Denies pain, nausea, dizziness, lightheadedness, visual changes, chills. No SOB/dyspnea noted. No cough noted. Speaking in full sentences without difficulty. States \"I feel fine now.\" Assisted into position of comfort on gurney. Denies needs. Call radha within reach. Family at bedside. Patient and family updated regarding plan of care.  "

## 2021-03-16 NOTE — DISCHARGE INSTRUCTIONS
Please sit in chair with good lumbar support  Flexeril as needed for pain and spasm along with Tylenol  Return if significant change in symptoms and in functional decline

## 2021-03-16 NOTE — ED TRIAGE NOTES
Pt to triage in WC w/ son.  Chief Complaint   Patient presents with   • Generalized Body Aches     onset this am   • Shortness of Breath     worse w/ exertion     Pt states she felt fine yesterday but could not get out of bed this am. No known exposure to to covid. Has received 1 shot of 2 shot series 2wks ago.

## 2021-03-17 ENCOUNTER — HOSPITAL ENCOUNTER (EMERGENCY)
Facility: MEDICAL CENTER | Age: 86
End: 2021-03-17
Attending: EMERGENCY MEDICINE
Payer: MEDICARE

## 2021-03-17 VITALS
TEMPERATURE: 98 F | DIASTOLIC BLOOD PRESSURE: 57 MMHG | RESPIRATION RATE: 18 BRPM | BODY MASS INDEX: 21.87 KG/M2 | WEIGHT: 112 LBS | HEART RATE: 77 BPM | OXYGEN SATURATION: 96 % | SYSTOLIC BLOOD PRESSURE: 105 MMHG

## 2021-03-17 DIAGNOSIS — S39.012A STRAIN OF LUMBAR REGION, INITIAL ENCOUNTER: ICD-10-CM

## 2021-03-17 PROCEDURE — 700102 HCHG RX REV CODE 250 W/ 637 OVERRIDE(OP): Performed by: EMERGENCY MEDICINE

## 2021-03-17 PROCEDURE — A9270 NON-COVERED ITEM OR SERVICE: HCPCS | Performed by: EMERGENCY MEDICINE

## 2021-03-17 PROCEDURE — 99285 EMERGENCY DEPT VISIT HI MDM: CPT

## 2021-03-17 RX ORDER — ACETAMINOPHEN 325 MG/1
650 TABLET ORAL ONCE
Status: COMPLETED | OUTPATIENT
Start: 2021-03-17 | End: 2021-03-17

## 2021-03-17 RX ADMIN — ACETAMINOPHEN 650 MG: 325 TABLET ORAL at 09:46

## 2021-03-17 ASSESSMENT — FIBROSIS 4 INDEX: FIB4 SCORE: 2.6

## 2021-03-17 NOTE — ED TRIAGE NOTES
89 y/o female bib ambulance from home for evaluation of mid back pain and generalized body aches. Pt was here yesterday for the same, she had a full work up and was discharged to home. Pt pain was somewhat resolved after taking tylenol yesterday but she did not take tylenol this morning. Pt states pain increases with movement.

## 2021-03-17 NOTE — ED NOTES
Pt discharged to home with son. Pt and son verbalized understanding of all instructions for discharge pt is wheeled out of ED and assisted into son's vehicle.

## 2021-03-17 NOTE — ED NOTES
Pt dressed and assisted into wheelchair, brought to restroom. Able to urinate without difficulty. Waiting for son to arrive for discharge to home.

## 2021-03-17 NOTE — ED NOTES
Spoke to pt granddaughter regarding likely discharge to home. Aware that pt is resting more comfortably now.

## 2021-03-17 NOTE — ED PROVIDER NOTES
ED Provider Note    CHIEF COMPLAINT  Chief Complaint   Patient presents with   • Back Pain   • Body Aches       HPI  Julito Mckenzie is a 89 y.o. female who presents for evaluation of some mild low back pain.  The patient is quite healthy 89-year-old female.  She does live alone.  She was seen here yesterday and had extensive work-up including laboratory studies urinalysis and a CT scan of the lumbosacral spine.  There is no report of any new trauma fevers or chills.  She got better after Tylenol yesterday no Tylenol was administered in the last 12 hours.  She denies any fevers or chills numbness weakness or tingling    REVIEW OF SYSTEMS  See HPI for further details.  No high fevers chills night sweats weight loss all other systems are negative.     PAST MEDICAL HISTORY  Past Medical History:   Diagnosis Date   • Chronic migraine    • Hypertension        FAMILY HISTORY  Noncontributory    SOCIAL HISTORY  Social History     Socioeconomic History   • Marital status:      Spouse name: Not on file   • Number of children: Not on file   • Years of education: Not on file   • Highest education level: Not on file   Occupational History   • Not on file   Tobacco Use   • Smoking status: Never Smoker   • Smokeless tobacco: Never Used   Substance and Sexual Activity   • Alcohol use: No   • Drug use: No   • Sexual activity: Not on file   Other Topics Concern   • Not on file   Social History Narrative   • Not on file     Social Determinants of Health     Financial Resource Strain:    • Difficulty of Paying Living Expenses:    Food Insecurity:    • Worried About Running Out of Food in the Last Year:    • Ran Out of Food in the Last Year:    Transportation Needs:    • Lack of Transportation (Medical):    • Lack of Transportation (Non-Medical):    Physical Activity:    • Days of Exercise per Week:    • Minutes of Exercise per Session:    Stress:    • Feeling of Stress :    Social Connections:    • Frequency of  Communication with Friends and Family:    • Frequency of Social Gatherings with Friends and Family:    • Attends Christianity Services:    • Active Member of Clubs or Organizations:    • Attends Club or Organization Meetings:    • Marital Status:    Intimate Partner Violence:    • Fear of Current or Ex-Partner:    • Emotionally Abused:    • Physically Abused:    • Sexually Abused:        SURGICAL HISTORY  No past surgical history on file.    CURRENT MEDICATIONS  Home Medications    **Home medications have not yet been reviewed for this encounter**     No current facility-administered medications for this encounter.    Current Outpatient Medications:   •  cyclobenzaprine (FLEXERIL) 5 mg tablet, Take 1 tablet by mouth 3 times a day as needed., Disp: 20 tablet, Rfl: 0  •  Menthol-Methyl Salicylate (BENGAY ARTHRITIS FORMULA EX), 5 % by Apply externally route as needed (for pain relief)., Disp: , Rfl:   •  hydrocortisone 1 % Cream, Apply 1 Each to affected area(s) 3 times a day. For itchiness  Indications: Itching, Disp: , Rfl:   •  asa/apap/caffeine (EXCEDRIN EXTRA STRENGTH) 250-250-65 MG Tab, Take 1 Tab by mouth every 6 hours as needed for Headache. Indications: Headache, Disp: , Rfl:   •  Mirabegron ER (MYRBETRIQ) 25 MG TABLET SR 24 HR, Take 1 Tab by mouth every day. Indications: Frequent Urination, Urinary Incontinence, Disp: , Rfl:   •  doxylamine (UNISOM) 25 MG Tab tablet, Take 25 mg by mouth every bedtime. Indications: Trouble Sleeping, Disp: , Rfl:   •  acetaminophen (TYLENOL) 650 MG CR tablet, Take 650 mg by mouth every 8 hours as needed. Indications: Pain, Disp: , Rfl:   •  Multiple Vitamins-Minerals (CENTRUM SILVER) Tab, Take 1 Tab by mouth every day. Indications: supplement, Disp: , Rfl:       ALLERGIES  No Known Allergies    PHYSICAL EXAM  VITAL SIGNS: /58   Pulse 85   Temp 36.7 °C (98 °F) (Temporal)   Resp 18   Wt 50.8 kg (112 lb)   SpO2 96%   BMI 21.87 kg/m²       Constitutional: Well developed,  Well nourished, No acute distress, Non-toxic appearance.   HENT: Normocephalic, Atraumatic, Oropharynx moist, No oral exudates, Nose normal.   Eyes: PERRLA, EOMI, Conjunctiva normal, No discharge.   Neck:  No stridor.    Cardiovascular: Normal heart rate, Normal rhythm, No murmurs, No rubs, No gallops.   Thorax & Lungs: Normal breath sounds, No respiratory distress, No wheezing, No chest tenderness.   Abdomen: Bowel sounds normal, Soft, No tenderness, No masses, No pulsatile masses.   Skin: Warm, Dry, No erythema, No rash.   Back: Minimal discomfort at L2-L3 without step-off.   Extremities: Intact distal pulses, No edema, No tenderness, No cyanosis, No clubbing.   Neurologic: Alert & oriented x 3, Normal motor function, Normal sensory function, No focal deficits noted.   Psychiatric: Affect normal, Judgment normal, Mood normal.         COURSE & MEDICAL DECISION MAKING  Pertinent Labs & Imaging studies reviewed. (See chart for details)  I reviewed the patient's extensive work-up from yesterday including CBC metabolic panel urinalysis and CT scan.  She has no new trauma vital signs are stable and normal.  Did not feel that doing repeat studies was clinically indicated.  She was given Tylenol and a cup of tea and feels much better.  I counseled the family that she likely has some chronic musculoskeletal back pain and they can liberally treat her with Tylenol and appropriate doses.  She will be discharged home into the care of her family    FINAL IMPRESSION  1.  Chronic low back pain      Electronically signed by: Braxton Mann M.D., 3/17/2021 10:14 AM

## 2021-06-09 NOTE — PROGRESS NOTES
Subjective:     Julito Mckenzie is a 89 y.o. female who presents for Migraine ([id number] )       Headache   This is a new problem. The pain quality is similar to prior headaches. The pain is moderate. Associated symptoms include nausea. Pertinent negatives include no vomiting or weakness.     Son present. History collected from patient and son.    Patient reports headache x 3-4 days with nausea. History of chronic, recurrent headaches. Symptoms feel similar.    Patient concerned that she is taking too many prescription medications and is wondering if this may be the cause of her headache.    Has been taking OTC acetaminophen and migraine medication which helps temporarily.    Sees PCP. Has not seen neurology.    Patient was screened prior to rooming and they denied COVID-19 diagnosis or contact with a person who has been diagnosed or is suspected to have COVID-19. During this visit, appropriate PPE was worn, hand hygiene was performed, and the patient and any visitors were masked.     PMH:  has a past medical history of Chronic migraine and Hypertension.    MEDS:   Current Outpatient Medications:   •  amLODIPine (NORVASC) 10 MG Tab, Take 10 mg by mouth every day., Disp: , Rfl:   •  irbesartan-hydrochlorothiazide (AVALIDE) 150-12.5 MG per tablet, Take 1 tablet by mouth every day., Disp: , Rfl:   •  Vitamin B Complex-C Cap, Take  by mouth., Disp: , Rfl:   •  traZODone (DESYREL) 50 MG Tab, Take 50 mg by mouth every evening., Disp: , Rfl:   •  oxybutynin (DITROPAN) 5 MG Tab, Take 5 mg by mouth 3 times a day., Disp: , Rfl:   •  cyclobenzaprine (FLEXERIL) 5 mg tablet, Take 1 tablet by mouth 3 times a day as needed., Disp: 20 tablet, Rfl: 0  •  asa/apap/caffeine (EXCEDRIN EXTRA STRENGTH) 250-250-65 MG Tab, Take 1 Tab by mouth every 6 hours as needed for Headache. Indications: Headache, Disp: , Rfl:   •  acetaminophen (TYLENOL) 650 MG CR tablet, Take 650 mg by mouth every 8 hours as needed. Indications: Pain, Disp: ,  "Rfl:   •  Menthol-Methyl Salicylate (BENGAY ARTHRITIS FORMULA EX), 5 % by Apply externally route as needed (for pain relief). (Patient not taking: Reported on 6/9/2021), Disp: , Rfl:   •  hydrocortisone 1 % Cream, Apply 1 Each to affected area(s) 3 times a day. For itchiness  Indications: Itching (Patient not taking: Reported on 6/9/2021), Disp: , Rfl:   •  Mirabegron ER (MYRBETRIQ) 25 MG TABLET SR 24 HR, Take 1 Tab by mouth every day. Indications: Frequent Urination, Urinary Incontinence (Patient not taking: Reported on 6/9/2021), Disp: , Rfl:   •  doxylamine (UNISOM) 25 MG Tab tablet, Take 25 mg by mouth every bedtime. Indications: Trouble Sleeping (Patient not taking: Reported on 6/9/2021), Disp: , Rfl:   •  Multiple Vitamins-Minerals (CENTRUM SILVER) Tab, Take 1 Tab by mouth every day. Indications: supplement (Patient not taking: Reported on 6/9/2021), Disp: , Rfl:     ALLERGIES: No Known Allergies    SURGHX: History reviewed. No pertinent surgical history.    SOCHX:  reports that she has never smoked. She has never used smokeless tobacco. She reports that she does not drink alcohol and does not use drugs.     FH: Reviewed with patient, not pertinent to this visit.    Review of Systems   Constitutional: Negative.  Negative for malaise/fatigue.   Gastrointestinal: Positive for nausea. Negative for vomiting.   Neurological: Positive for headaches. Negative for sensory change and weakness.   All other systems reviewed and are negative.    Additional details per HPI.      Objective:     /64   Pulse 90   Temp 36.1 °C (96.9 °F) (Temporal)   Resp 14   Ht 1.549 m (5' 1\")   Wt 50.8 kg (112 lb)   SpO2 98%   BMI 21.16 kg/m²     Physical Exam  Vitals reviewed.   Constitutional:       General: She is not in acute distress.     Appearance: She is well-developed. She is not ill-appearing or toxic-appearing.   HENT:      Head: Normocephalic.      Right Ear: External ear normal.      Left Ear: External ear normal. " "  Eyes:      General: Vision grossly intact.      Extraocular Movements: Extraocular movements intact.      Conjunctiva/sclera: Conjunctivae normal.   Cardiovascular:      Rate and Rhythm: Normal rate.   Pulmonary:      Effort: Pulmonary effort is normal. No respiratory distress.   Musculoskeletal:         General: No deformity. Normal range of motion.      Cervical back: Normal range of motion.   Skin:     Coloration: Skin is not pale.   Neurological:      Mental Status: She is alert and oriented to person, place, and time.      Sensory: No sensory deficit.      Motor: No weakness.      Gait: Gait normal.   Psychiatric:         Behavior: Behavior normal. Behavior is cooperative.       Assessment/Plan:     1. History of migraine    2. Chronic nonintractable headache, unspecified headache type    Chronic, recurrent problem. Patient reports similar symptoms as past episodes. Vital signs stable, afebrile, no acute distress at this time. Patient was primarily concerned about her medication regimen and thinks her headaches may be related to that. Patient is currently on antihypertensives and appears to also be on medication for OAB, supplements, and OTC topical/oral analgesics. Doubtful that these are contributing to her headache. They deny recent change in medications. Advised to continue.    Prior external notes from unique source dated 3/6/2020 reviewed: Patient hospitalized March of 2020 for headache with history of HTN, chronic dizziness, and migraine. Headache resolved and MRI was normal and patient was discharged to follow up with her PCP.    Unique test result dated 3/6/2020 reviewed: MRI of brain \"within normal limits for age with moderate atrophy and advanced white matter changes.\"    Suggest consulting with neurology for chronic headache; they defer at this time and will follow up with PCP.    The following treatment plan was discussed with the patient/son and also provided on the printed after-visit summary " (also available on [employer]).    · It is important to continue to take your routine prescription medication as directed by your primary care provider  · May take over-the-counter medication (e.g. acetaminophen) as needed for headache  · Follow up with your primary care provider regarding chronic headaches/migraines; may need to see neurology  · Return if headache persists for more than 1 week  · Report to the ER if headache worsens or other symptoms occur    Differential diagnosis, natural history, supportive care, over-the-counter symptom management per 's instructions, close monitoring, and indications for immediate follow-up discussed.     Patient and son advised to: Return for 1) Symptoms that worsen/don't improve, or go to ER, 2) Follow up with primary care in 7-10 days.    All questions answered. Patient and son agree with the plan of care.

## 2021-06-09 NOTE — PATIENT INSTRUCTIONS
Migraine Headache  A migraine headache is an intense, throbbing pain on one side or both sides of the head. Migraine headaches may also cause other symptoms, such as nausea, vomiting, and sensitivity to light and noise. A migraine headache can last from 4 hours to 3 days. Talk with your doctor about what things may bring on (trigger) your migraine headaches.  What are the causes?  The exact cause of this condition is not known. However, a migraine may be caused when nerves in the brain become irritated and release chemicals that cause inflammation of blood vessels. This inflammation causes pain. This condition may be triggered or caused by:  · Drinking alcohol.  · Smoking.  · Taking medicines, such as:  ? Medicine used to treat chest pain (nitroglycerin).  ? Birth control pills.  ? Estrogen.  ? Certain blood pressure medicines.  · Eating or drinking products that contain nitrates, glutamate, aspartame, or tyramine. Aged cheeses, chocolate, or caffeine may also be triggers.  · Doing physical activity.  Other things that may trigger a migraine headache include:  · Menstruation.  · Pregnancy.  · Hunger.  · Stress.  · Lack of sleep or too much sleep.  · Weather changes.  · Fatigue.  What increases the risk?  The following factors may make you more likely to experience migraine headaches:  · Being a certain age. This condition is more common in people who are 25-55 years old.  · Being female.  · Having a family history of migraine headaches.  · Being .  · Having a mental health condition, such as depression or anxiety.  · Being obese.  What are the signs or symptoms?  The main symptom of this condition is pulsating or throbbing pain. This pain may:  · Happen in any area of the head, such as on one side or both sides.  · Interfere with daily activities.  · Get worse with physical activity.  · Get worse with exposure to bright lights or loud noises.  Other symptoms may  include:  · Nausea.  · Vomiting.  · Dizziness.  · General sensitivity to bright lights, loud noises, or smells.  Before you get a migraine headache, you may get warning signs (an aura). An aura may include:  · Seeing flashing lights or having blind spots.  · Seeing bright spots, halos, or zigzag lines.  · Having tunnel vision or blurred vision.  · Having numbness or a tingling feeling.  · Having trouble talking.  · Having muscle weakness.  Some people have symptoms after a migraine headache (postdromal phase), such as:  · Feeling tired.  · Difficulty concentrating.  How is this diagnosed?  A migraine headache can be diagnosed based on:  · Your symptoms.  · A physical exam.  · Tests, such as:  ? CT scan or an MRI of the head. These imaging tests can help rule out other causes of headaches.  ? Taking fluid from the spine (lumbar puncture) and analyzing it (cerebrospinal fluid analysis, or CSF analysis).  How is this treated?  This condition may be treated with medicines that:  · Relieve pain.  · Relieve nausea.  · Prevent migraine headaches.  Treatment for this condition may also include:  · Acupuncture.  · Lifestyle changes like avoiding foods that trigger migraine headaches.  · Biofeedback.  · Cognitive behavioral therapy.  Follow these instructions at home:  Medicines  · Take over-the-counter and prescription medicines only as told by your health care provider.  · Ask your health care provider if the medicine prescribed to you:  ? Requires you to avoid driving or using heavy machinery.  ? Can cause constipation. You may need to take these actions to prevent or treat constipation:  § Drink enough fluid to keep your urine pale yellow.  § Take over-the-counter or prescription medicines.  § Eat foods that are high in fiber, such as beans, whole grains, and fresh fruits and vegetables.  § Limit foods that are high in fat and processed sugars, such as fried or sweet foods.  Lifestyle  · Do not drink alcohol.  · Do not  use any products that contain nicotine or tobacco, such as cigarettes, e-cigarettes, and chewing tobacco. If you need help quitting, ask your health care provider.  · Get at least 8 hours of sleep every night.  · Find ways to manage stress, such as meditation, deep breathing, or yoga.  General instructions         · Keep a journal to find out what may trigger your migraine headaches. For example, write down:  ? What you eat and drink.  ? How much sleep you get.  ? Any change to your diet or medicines.  · If you have a migraine headache:  ? Avoid things that make your symptoms worse, such as bright lights.  ? It may help to lie down in a dark, quiet room.  ? Do not drive or use heavy machinery.  ? Ask your health care provider what activities are safe for you while you are experiencing symptoms.  · Keep all follow-up visits as told by your health care provider. This is important.  Contact a health care provider if:  · You develop symptoms that are different or more severe than your usual migraine headache symptoms.  · You have more than 15 headache days in one month.  Get help right away if:  · Your migraine headache becomes severe.  · Your migraine headache lasts longer than 72 hours.  · You have a fever.  · You have a stiff neck.  · You have vision loss.  · Your muscles feel weak or like you cannot control them.  · You start to lose your balance often.  · You have trouble walking.  · You faint.  · You have a seizure.  Summary  · A migraine headache is an intense, throbbing pain on one side or both sides of the head. Migraines may also cause other symptoms, such as nausea, vomiting, and sensitivity to light and noise.  · This condition may be treated with medicines and lifestyle changes. You may also need to avoid certain things that trigger a migraine headache.  · Keep a journal to find out what may trigger your migraine headaches.  · Contact your health care provider if you have more than 15 headache days in a  month or you develop symptoms that are different or more severe than your usual migraine headache symptoms.  This information is not intended to replace advice given to you by your health care provider. Make sure you discuss any questions you have with your health care provider.  Document Released: 12/18/2006 Document Revised: 04/10/2020 Document Reviewed: 01/30/2020  [employer] Patient Education © 2020 [employer]      · It is important to continue to take your routine prescription medication as directed by your primary care provider  · May take over-the-counter medication (e.g. acetaminophen) as needed for headache  · Follow up with your primary care provider regarding chronic headaches/migraines; may need to see neurology  · Return if headache persists for more than 1 week  · Report to the ER if headache worsens or other symptoms occur

## 2021-06-10 NOTE — ED TRIAGE NOTES
Julito Mckenzie  89 y.o.  female  Chief Complaint   Patient presents with   • Migraine     Hx migraine, frontal, 8/10 for 2 weeks. Today woke with nausea & was dry heaving. Denies abdominal pain. Denies photosensitivity. PERRLA. Steady gait.       No neuro deficits. Pt normally takes tylenol for headache & arthritis pain but none today due to nausea. Granddaughter with pt. Pt educated on triage process & returned to lobby.

## 2021-06-10 NOTE — ED PROVIDER NOTES
ED Provider Note    Scribed for Stu Hermosillo M.D. by Clement Salas. 6/10/2021  7:54 AM    Primary care provider: Mike Guajardo M.D.  Means of arrival: Walk-in  History obtained from: Patient  History limited by: None    CHIEF COMPLAINT  Chief Complaint   Patient presents with    Migraine     Hx migraine, frontal, 8/10 for 2 weeks. Today woke with nausea & was dry heaving. Denies abdominal pain. Denies photosensitivity. PERRLA. Steady gait.       HPI  Julito Mckenzie is a 89 y.o. female with a history of migraines who presents to the Emergency Department for continued, intermittent migraine headache onset 2 weeks ago. Patient reports having a history of chronic migraines, and her current episode started 2 weeks ago. She describes her headache as located in the front. Patient normally takes Tylenol for her headaches, but this morning began to feel nauseous and has been unable to tolerate her medications. She denies any acute vision changes, diarrhea, fevers, or vomiting.     REVIEW OF SYSTEMS  Pertinent positives include headache and nausea.   Pertinent negatives include no vision changes, diarrhea, fevers, or vomiting.    All other systems reviewed and negative. See HPI for further details.       PAST MEDICAL HISTORY   has a past medical history of Chronic migraine and Hypertension.    SURGICAL HISTORY  patient denies any surgical history    SOCIAL HISTORY  Social History     Tobacco Use    Smoking status: Never Smoker    Smokeless tobacco: Never Used   Vaping Use    Vaping Use: Never used   Substance Use Topics    Alcohol use: No    Drug use: No      Social History     Substance and Sexual Activity   Drug Use No       FAMILY HISTORY  No family history on file.    CURRENT MEDICATIONS  Current Outpatient Medications   Medication Instructions    acetaminophen (TYLENOL) 650 mg, Oral, EVERY 8 HOURS PRN    amLODIPine (NORVASC) 10 mg, Oral, DAILY    asa/apap/caffeine (EXCEDRIN EXTRA STRENGTH) 250-250-65 MG Tab  "1 tablet, Oral, EVERY 6 HOURS PRN    cyclobenzaprine (FLEXERIL) 5 mg, Oral, 3 TIMES DAILY PRN    doxylamine (UNISOM) 25 mg, EVERY BEDTIME    hydrocortisone 1 % Cream 1 Each, 3 TIMES DAILY    irbesartan-hydrochlorothiazide (AVALIDE) 150-12.5 MG per tablet 1 tablet, Oral, DAILY    Menthol-Methyl Salicylate (BENGAY ARTHRITIS FORMULA EX) 5 %, PRN    Mirabegron ER (MYRBETRIQ) 25 MG TABLET SR 24 HR 1 tablet, DAILY    Multiple Vitamins-Minerals (CENTRUM SILVER) Tab 1 tablet, DAILY    oxybutynin (DITROPAN) 5 mg, Oral, 3 TIMES DAILY    traZODone (DESYREL) 50 mg, Oral, NIGHTLY    Vitamin B Complex-C Cap Oral         ALLERGIES  No Known Allergies    PHYSICAL EXAM  VITAL SIGNS: /74   Pulse 83   Temp 36.1 °C (97 °F) (Temporal)   Resp 14   Ht 1.549 m (5' 1\")   Wt 50 kg (110 lb 3.7 oz)   SpO2 96%   BMI 20.83 kg/m²   Nursing note and vitals reviewed.  Constitutional: Well-developed and well-nourished. No distress.   HENT: No tenderness over the temporal artery. Head is normocephalic and atraumatic. Oropharynx is clear and moist without exudate or erythema.   Eyes: Pupils are equal, round, and reactive to light. Conjunctiva are normal.   Cardiovascular: Normal rate and regular rhythm. No murmur heard. Normal radial pulses.  Pulmonary/Chest: Breath sounds normal. No wheezes or rales.   Abdominal: Soft and non-tender. No distention    Musculoskeletal: Extremities exhibit normal range of motion without edema or tenderness.   Neurological: Awake, alert and oriented to person, place, and time. No focal deficits noted.  Skin: Skin is warm and dry. No rash.   Psychiatric: Normal mood and affect. Appropriate for clinical situation.    COURSE & MEDICAL DECISION MAKING  Nursing notes, VS, PMSFHx reviewed in chart.     7:54 AM - Patient seen and examined at bedside. Patient will be treated with Zofran ODT 4 mg, Motrin 400 mg, and Tylenol 500 mg.      9:20 AM - On repeat evaluation, patient is feeling improved. Return precautions " "discussed and patient was cleared for discharge.     Patient presents today with a headache.  She has chronic headaches.  She was unable to take her normal medications today because of nausea.  Her nausea was treated in the emergency department.  She has a benign abdominal exam.  She has no \"red flags\".  I feel that she has appropriate for discharge.  Feels better after taking her medications in the emergency department.    The patient will return for new or worsening symptoms and is stable at the time of discharge.    The patient is referred to a primary physician for blood pressure management, diabetic screening, and for all other preventative health concerns.    DISPOSITION:  Patient will be discharged home in stable condition.    FOLLOW UP:  Mike Guajardo M.D.  [address] [id number] [phone]    Schedule an appointment as soon as possible for a visit       [facility], Emergency Dept  [address] [facility] [address] [phone]    If symptoms worsen      OUTPATIENT MEDICATIONS:  New Prescriptions    No medications on file       FINAL IMPRESSION  1. Chronic nonintractable headache, unspecified headache type    2. Nausea          Clement ORTEGA (Juli), am scribing for, and in the presence of, Stu Hermosillo M.D..    Electronically signed by: Clement Cortez), 6/10/2021    Stu ORTEGA M.D. personally performed the services described in this documentation, as scribed by Clement Salas in my presence, and it is both accurate and complete.    The note accurately reflects work and decisions made by me.  Stu Hermosillo M.D.  6/10/2021  11:24 AM  "

## 2021-06-10 NOTE — ED NOTES
Pt ambulated to room from [facility]. Changed into gown. Connected to monitor. Agree with triage note. Chart up for ERP. Call light in reach.

## 2021-06-10 NOTE — ED NOTES
Reviewed discharge paperwork and prescription with pt and family member. Pt verbalized understanding. Pt VSS prior to d/c. Ambulatory. Transported with family.

## 2021-11-08 NOTE — TELEPHONE ENCOUNTER
Outcome: Scheduled Comprehensive Health Assessment     Please transfer to Patient Outreach Team at [phone] when patient returns call.    [facility] Verified: yes    Attempt # 2

## 2021-11-26 NOTE — PROGRESS NOTES
"Lilibeth Mckenzie is a 89 y.o. female who presents with Fall (x 3 weeks / fell back/ ) and Back Pain (everyday/ lower )      - This is a pleasant  89 y.o. female who is not best historian and has been brought in by relatives to the local out-patient walk-in clinic today for 2-3 weeks w. Malaise-fatigue and had a fall 2-3 wks ago and hurt her back. She does not recall details about fall. Complains of bad lower back pain and says feels generally weak and says cant walk for more then a few steps and has to stop and rest.       ALLERGIES:  Patient has no known allergies.     PMH:  Past Medical History:   Diagnosis Date   • Chronic migraine    • Hypertension         PSH:  History reviewed. No pertinent surgical history.    MEDS:    Current Outpatient Medications:   •  amLODIPine (NORVASC) 10 MG Tab, Take 10 mg by mouth every day., Disp: , Rfl:   •  cyclobenzaprine (FLEXERIL) 5 mg tablet, Take 1 tablet by mouth 3 times a day as needed., Disp: 20 tablet, Rfl: 0  •  asa/apap/caffeine (EXCEDRIN EXTRA STRENGTH) 250-250-65 MG Tab, Take 1 Tab by mouth every 6 hours as needed for Headache. Indications: Headache, Disp: , Rfl:   •  acetaminophen (TYLENOL) 650 MG CR tablet, Take 650 mg by mouth every 8 hours as needed. Indications: Pain, Disp: , Rfl:     ** I have documented what I find to be significant in regards to past medical, social, family and surgical history  in my HPI or under PMH/PSH/FH review section, otherwise it is noncontributory **           HPI    Review of Systems   Musculoskeletal: Positive for back pain, falls and myalgias.   All other systems reviewed and are negative.             Objective     /60 (BP Location: Left arm, Patient Position: Sitting, BP Cuff Size: Adult)   Pulse 87   Temp 36.6 °C (97.9 °F) (Temporal)   Resp 16   Ht 1.549 m (5' 1\")   Wt 50.8 kg (112 lb)   SpO2 92%   BMI 21.16 kg/m²      Physical Exam  Vitals and nursing note reviewed.   Constitutional:       General: She " is not in acute distress.     Appearance: Normal appearance. She is well-developed.   HENT:      Head: Normocephalic and atraumatic.      Mouth/Throat:      Mouth: Mucous membranes are dry.      Pharynx: No posterior oropharyngeal erythema.   Eyes:      General: No scleral icterus.  Cardiovascular:      Heart sounds: Normal heart sounds. No murmur heard.      Pulmonary:      Effort: Pulmonary effort is normal. No respiratory distress.      Breath sounds: Normal breath sounds.   Abdominal:      Palpations: Abdomen is soft.      Tenderness: There is no abdominal tenderness.   Musculoskeletal:        Legs:       Comments: Bilateral lower extremity strength and sensory intact.  Negative straight leg raise.   Can toe and heel walk  Some pain to palp/rom    Skin:     Coloration: Skin is not jaundiced or pale.   Neurological:      Mental Status: She is alert.      Motor: No abnormal muscle tone.   Psychiatric:         Mood and Affect: Mood normal.         Behavior: Behavior normal.             Assessment & Plan         1. Fall from ground level  DX-LUMBAR SPINE-2 OR 3 VIEWS   2. Dorsalgia  DX-LUMBAR SPINE-2 OR 3 VIEWS   3. Malaise and fatigue       Xray: no acute findings by MY READ. RADIOLOGY READ PENDING.     - Dx, plan & d/c instructions discussed (asked to go to ER today for further evaluation of her malaise/fatigue. Concerned she may dehydrated w/ JOHANA)      Today's radiology imaging personally reviewed by me today on day of visit and Radiology readings reviewed and discussed w/ patient today.     Pertinent prior lab work and/or imaging studies in Epic have been reviewed by me today on day of this visit.      Pertinent prior office visit notes in [facility] have been reviewed by me today on day of this visit.

## 2021-11-27 NOTE — ED PROVIDER NOTES
ED Provider Note    Scribed for Dr. Jonathan Lara M.D. by Noni Kuo. 11/26/2021  7:32 PM    Primary care provider: Mkie Guajardo M.D.  Means of arrival: walk-in  History obtained from: patient and family  History limited by: none    CHIEF COMPLAINT  Chief Complaint   Patient presents with   • GLF   • Memory Loss       HPI  Julito Mckenzie is a 89 y.o. female who presents to the Emergency Department for evaluation of injuries following a fall onset 2 days ago. Two days ago the patient had a fall. She denies loss of consciousness. When the patient was in the waiting room she was complaining of a headache. She denies hitting her head however her family believes she did hit her head. According to her family she has been extremely confused. She denies nausea, vomiting, or changes in her vision. No alleviating factors were reported. She does not use blood thinners.    REVIEW OF SYSTEMS  Pertinent positives include headache, confusion, old ground level fall. Pertinent negatives include no loss of consciousness, vomiting, vision changes, or  nausea. As above, all other systems reviewed and are negative. See HPI for further details.     PAST MEDICAL HISTORY   has a past medical history of Chronic migraine and Hypertension.    SURGICAL HISTORY  patient denies any surgical history    SOCIAL HISTORY  Social History     Tobacco Use   • Smoking status: Never Smoker   • Smokeless tobacco: Never Used   Vaping Use   • Vaping Use: Never used   Substance Use Topics   • Alcohol use: No   • Drug use: No      Social History     Substance and Sexual Activity   Drug Use No       FAMILY HISTORY  History reviewed. No pertinent family history.    CURRENT MEDICATIONS  Home Medications     Reviewed by Nichelle Moreau R.N. (Registered Nurse) on 11/26/21 at 1723  Med List Status: Not Addressed   Medication Last Dose Status   acetaminophen (TYLENOL) 650 MG CR tablet  Active   amLODIPine (NORVASC) 10 MG Tab  Active  "  asa/apap/caffeine (EXCEDRIN EXTRA STRENGTH) 250-250-65 MG Tab  Active   cyclobenzaprine (FLEXERIL) 5 mg tablet  Active                ALLERGIES  No Known Allergies    PHYSICAL EXAM  VITAL SIGNS: /70   Pulse 95   Temp 36.8 °C (98.2 °F) (Temporal)   Resp 18   Ht 1.549 m (5' 1\")   Wt 46 kg (101 lb 6.6 oz)   SpO2 96%   BMI 19.16 kg/m²     Constitutional: Well developed, Well nourished, No distress, Non-toxic appearance but mildly confused.    HENT: Normocephalic, Atraumatic, Bilateral external ears normal, Oropharynx moist, No oral exudates.   Eyes: PERRLA, EOMI, Conjunctiva normal, No discharge.   Neck: No tenderness, Supple, No stridor.   Lymphatic: No lymphadenopathy noted.   Cardiovascular: Normal heart rate, Normal rhythm.   Thorax & Lungs: Clear to auscultation bilaterally, No respiratory distress, No wheezing, No crackles.   Abdomen: Soft, No tenderness, No masses, No pulsatile masses.   Skin: Warm, Dry, No erythema, No rash.   Extremities:, No edema No cyanosis.   Musculoskeletal: No tenderness to palpation or major deformities noted.  Intact distal pulses  Neurologic: Awake, alert. Moves all extremities spontaneously.  Psychiatric: Mildly confused, did not know place or date.      RADIOLOGY  CT-CSPINE WITHOUT PLUS RECONS   Final Result         1. No acute cervical spine fracture.   2. Multilevel spondylosis, worst at C3-C4, C5-C6 and C6-C7.   3. Minimal anterolisthesis of C3 on C4 and C4 on C5 and retrolisthesis of C5 on C6      CT-HEAD W/O   Final Result      1.  Cerebral atrophy.      2.  White matter lucencies most consistent with small vessel ischemic change versus demyelination or gliosis.      3.  Otherwise, Head CT without contrast with no acute findings. No evidence of acute cerebral infarction, hemorrhage or mass lesion.           The radiologist's interpretation of all radiological studies have been reviewed by me.    COURSE & MEDICAL DECISION MAKING  Pertinent Labs & Imaging studies " reviewed. (See chart for details)    7:32 PM - Patient seen and examined at bedside. Ordered CT-Head and CT-Cspine to evaluate her symptoms. The differential diagnoses include but are not limited to: closed head injury vs intracranial hemorrhage vs dementia    8:56 PM Patient was reevaluated at bedside. Discussed radiology results with the patient and informed them that there CT looks fine. I addressed the families concerns. I informed them of my plan for discharge. Patient verbalizes understanding and agreement to this plan of care.        Decision Making:  Patient with a ground-level fall who has some dementia seems worsened I am not finding any evidence of trauma to the head we did do a CT scan of the head which is negative at this point I think the patient can just be observed at home I do not really have any definite injury of head significant head injury will observe for head injury precautions    The patient will return for new or worsening symptoms and is stable at the time of discharge.    The patient is referred to a primary physician for blood pressure management, diabetic screening, and for all other preventative health concerns.    DISPOSITION:  Patient will be discharged home in stable condition.      FINAL IMPRESSION  1. Closed head injury, initial encounter    2. Dementia without behavioral disturbance, unspecified dementia type (HCC)          Noni ORTEGA (Juli), am scribing for, and in the presence of, Jonathan Lara M.D..    Electronically signed by: Noni Kuo (Juli), 11/26/2021    Jonathan ORTEGA M.D. personally performed the services described in this documentation, as scribed by Noni Kuo in my presence, and it is both accurate and complete.    The note accurately reflects work and decisions made by me.  Jonathan Lara M.D.  11/26/2021  9:22 PM

## 2021-11-27 NOTE — ED TRIAGE NOTES
Pt presents with mother from home for two falls and head injury 2 days ago. No LOC. No bld thinners. Denies dizziness prior to falls. Seen at urgent care with neg back x ray. Confusion began 2 days ago. A&Ox3 and ambulatory with walker. Cannot report accurate year.    Has this patient been vaccinated for COVID yes

## 2021-12-22 PROBLEM — E16.2 HYPOGLYCEMIA: Status: RESOLVED | Noted: 2019-03-06 | Resolved: 2021-01-01

## 2021-12-22 PROBLEM — R74.8 ELEVATED ALKALINE PHOSPHATASE LEVEL: Status: ACTIVE | Noted: 2021-01-01

## 2021-12-22 PROBLEM — E87.20 METABOLIC ACIDOSIS: Status: RESOLVED | Noted: 2020-06-11 | Resolved: 2021-01-01

## 2021-12-22 PROBLEM — R55 NEAR SYNCOPE: Status: RESOLVED | Noted: 2019-03-06 | Resolved: 2021-01-01

## 2021-12-22 PROBLEM — E55.9 VITAMIN D DEFICIENCY: Status: ACTIVE | Noted: 2021-01-01

## 2021-12-22 PROBLEM — G47.00 INSOMNIA: Status: ACTIVE | Noted: 2021-01-01

## 2021-12-22 PROBLEM — I70.0 ATHEROSCLEROSIS OF AORTA (HCC): Status: ACTIVE | Noted: 2021-01-01

## 2021-12-22 PROBLEM — F39 MOOD DISORDER (HCC): Status: ACTIVE | Noted: 2021-01-01

## 2021-12-22 PROBLEM — N18.31 STAGE 3A CHRONIC KIDNEY DISEASE: Status: ACTIVE | Noted: 2020-06-11

## 2021-12-22 PROBLEM — N32.81 OVERACTIVE BLADDER: Status: ACTIVE | Noted: 2021-01-01

## 2021-12-22 PROBLEM — K85.90 PANCREATITIS: Status: RESOLVED | Noted: 2020-06-11 | Resolved: 2021-01-01

## 2021-12-22 PROBLEM — R73.9 HYPERGLYCEMIA: Status: RESOLVED | Noted: 2019-03-06 | Resolved: 2021-01-01

## 2021-12-22 PROBLEM — E87.1: Status: ACTIVE | Noted: 2021-01-01

## 2021-12-25 PROBLEM — R06.02 SOB (SHORTNESS OF BREATH): Status: ACTIVE | Noted: 2021-01-01

## 2021-12-25 PROBLEM — N17.9 AKI (ACUTE KIDNEY INJURY) (HCC): Status: ACTIVE | Noted: 2021-01-01

## 2021-12-26 PROBLEM — R53.1 WEAKNESS: Status: ACTIVE | Noted: 2021-01-01

## 2021-12-26 NOTE — ED NOTES
Med Rec completed per patient family interview with patient's granddaughter   Allergies reviewed  No ORAL antibiotics in last 30 days  Ok per patient to discuss medications with visitors present  home pharmacy: [id number] [facility] and [place] [id number]

## 2021-12-26 NOTE — ASSESSMENT & PLAN NOTE
Suspect to be related to new onset heart failure  DC Lasix  Echocardiogram showing intermediate diastolic dysfunction and EF of 70%

## 2021-12-26 NOTE — PROGRESS NOTES
"Hospital Medicine Daily Progress Note    Date of Service  12/26/2021    Chief Complaint  Julito Hinton is a 89 y.o. female admitted 12/25/2021 with weakness and shortness of breath    Hospital Course  Per notes, \"89 y.o. female with hx HTN and dementia, brought in by grandDTR for worsening SOB and some edema. She had not been diagnosed with kidney or heart issues in the past, she lives at home with her family and they take care of her. No recent illness, no f/c, no chest pain. Only SOB, slight edema LEs BL on exam.\"    Patient was admitted and evaluated overnight for shortness of breath and edema.  On evaluation next morning, 12/26, patient's family did report that she had had a fall little over a week ago, and one of the initial reason they brought her to the hospital was because she seemed weaker on left side, they had also noticed some facial droop on her left side and they were concerned for stroke.  She did have CT scans on admission, no acute findings but was eventually admitted for volume overload and possible CHF work-up.      Interval Problem Update  Patient is alert and oriented on exam, very weak bilaterally but did seem to have more weakness on the left side and some pronator drift.  I do not think a full  stroke work-up is necessary at this time as the symptoms seem to be subacute and she is out of the treatment.  For any acute treatments.  However, I will get an MRI today to rule out recent stroke for secondary prevention treatment purposes.  Additionally PT/OT will see her.  Patient passed a bedside swallow test to speech therapy necessary at this time.    Also on exam found to have bilateral lower extremity edema and some respiratory crackles, pending echocardiogram.    I have personally seen and examined the patient at bedside. I discussed the plan of care with patient, family, bedside RN, charge RN and .    Consultants/Specialty  None    Code " Status  DNAR/DNI    Disposition  Patient is not medically cleared for discharge.   Anticipate discharge to to home with organized home healthcare and close outpatient follow-up.  I have placed the appropriate orders for post-discharge needs.    Review of Systems  Review of Systems   Respiratory: Positive for shortness of breath.    Musculoskeletal: Positive for falls.   Neurological: Positive for focal weakness and weakness. Negative for speech change and loss of consciousness.   All other systems reviewed and are negative.       Physical Exam  Temp:  [36.4 °C (97.6 °F)-36.9 °C (98.5 °F)] 36.5 °C (97.7 °F)  Pulse:  [59-92] 79  Resp:  [18] 18  BP: (114-136)/(72-90) 118/72  SpO2:  [95 %-100 %] 99 %    Physical Exam  Vitals and nursing note reviewed.   Constitutional:       Appearance: Normal appearance.      Comments: Muscle wasting, alert, not in acute distress.   Cardiovascular:      Pulses: Normal pulses.      Heart sounds: Normal heart sounds.   Pulmonary:      Effort: Pulmonary effort is normal.      Breath sounds: Normal breath sounds.      Comments: Bibasilar crackles  Abdominal:      General: Abdomen is flat. Bowel sounds are normal.   Musculoskeletal:      Right lower leg: Edema present.      Left lower leg: Edema present.   Skin:     General: Skin is warm and dry.   Neurological:      General: No focal deficit present.      Mental Status: She is alert and oriented to person, place, and time. Mental status is at baseline.      Motor: Weakness present.      Comments: Did seem to have more weakness on left side, but difficult to assess.  Also seem to have a pronator drift on left side again difficult to assess due to global weakness.  No other focalized deficits         Fluids    Intake/Output Summary (Last 24 hours) at 12/26/2021 1216  Last data filed at 12/26/2021 1000  Gross per 24 hour   Intake 200 ml   Output 400 ml   Net -200 ml       Laboratory  Recent Labs     12/25/21  1830 12/25/21  2330   WBC 10.6  11.5*   RBC 3.40* 3.19*   HEMOGLOBIN 9.6* 9.1*   HEMATOCRIT 30.9* 28.0*   MCV 90.9 87.8   MCH 28.2 28.5   MCHC 31.1* 32.5*   RDW 54.3* 52.3*   PLATELETCT 356 361   MPV 9.4 9.5     Recent Labs     12/25/21  1830 12/25/21  2330   SODIUM 137 135   POTASSIUM 4.6 4.3   CHLORIDE 103 104   CO2 19* 17*   GLUCOSE 115* 114*   BUN 51* 50*   CREATININE 3.04* 2.73*   CALCIUM 8.5 8.3*     Recent Labs     12/25/21  1830   APTT 35.1   INR 1.16*         Recent Labs     12/25/21  2330   TRIGLYCERIDE 119   HDL 41   *       Imaging  EC-ECHOCARDIOGRAM COMPLETE W/O CONT         CT-CEREBRAL PERFUSION ANALYSIS   Final Result      Nondiagnostic study, limited by motion artifact.      DX-CHEST-PORTABLE (1 VIEW)   Final Result      No acute cardiopulmonary disease.      CT-HEAD W/O   Final Result      Diffuse atrophy and white matter changes.   No acute intracranial hemorrhage or territorial infarct.            MR-BRAIN-W/O    (Results Pending)        Assessment/Plan  * SOB (shortness of breath)  Assessment & Plan  Suspect to be related to new onset heart failure  We will continue with IV Lasix 40 mg daily  Follow-up on echocardiogram    Weakness- (present on admission)  Assessment & Plan  Patient is alert and oriented on exam, very weak bilaterally but did seem to have more weakness on the left side and some pronator drift.  I do not think a full stroke work-up is necessary at this time as the symptoms seem to be subacute and she is out of the treatment period for any acute treatments.    CT head on admission showed diffuse atrophy and white matter changes.  No acute intracranial hemorrhage or territorial infarct  However, I will get an MRI today to rule out recent stroke for secondary prevention treatment purposes.  Follow-up with PT/OT  Monitor neurological status      JOHANA (acute kidney injury) (HCC)- (present on admission)  Assessment & Plan  Suspect primarily prerenal with poor p.o. intake also some contribution to possible heart  failure  Check serum osm and urine sodium  Consider nephrology consultation although confirmed with granddaughter that patient and family would not want dialysis  Avoid nephrotoxins  Improving continue to monitor with labs    Vitamin D deficiency- (present on admission)  Assessment & Plan  Noted    Body mass index (BMI) less than 19 in adult- (present on admission)  Assessment & Plan  Noted    Stage 3a chronic kidney disease (HCC)- (present on admission)  Assessment & Plan  History of    Normocytic anemia- (present on admission)  Assessment & Plan  Check iron panel    Essential hypertension- (present on admission)  Assessment & Plan  Holding home medications for now given soft systolic blood pressure and initiation of Lasix  As needed medications ordered however       VTE prophylaxis: SCDs/TEDs and heparin ppx    I have performed a physical exam and reviewed and updated ROS and Plan today (12/26/2021). In review of yesterday's note (12/25/2021), there are no changes except as documented above.

## 2021-12-26 NOTE — PROGRESS NOTES
Bedside report received from DEANNA Olivo. Assumed pt care. Pt is AOx4. Denies any pain or other distress at this time. Discussed POC including pending echo, fall risk/precuations, call light usage, PO fluid intake. Pt verbalized understanding. Hourly rounding in place. Fall precautions in place and call light within reach.

## 2021-12-26 NOTE — ED NOTES
US guided IV + 2  infiltrated by CT. Perfusion test incomplete. Pt was brought back to room. # 22 started to LFA with blood work to lab. ERP aware.

## 2021-12-26 NOTE — ED PROVIDER NOTES
ED Provider Note    ER Provider Note         CHIEF COMPLAINT  Chief Complaint   Patient presents with   • Shortness of Breath   • Abdominal Pain       HPI  Julito Hinton is a 89 y.o. female who presents to the Emergency Department with an episode of weakness in her left arm that happened around 9 AM.  The patient is also had some facial droop that started at this time.  This was seen by the daughter.  Patient is also been having worsening shortness of breath that has been going on for the past many weeks.  She says the walking around is very difficult for her.  In addition just prior to arrival the patient started having epigastric pain there is no come and went.  There is no nausea or vomiting associated with this.  There is no fever or chills.  There is no dysuria.  She denies any falls.  She denies any chest pain at this time.  There is no report of numbness or weakness at this time.    REVIEW OF SYSTEMS  See HPI for further details. All other systems are negative.     PAST MEDICAL HISTORY   has a past medical history of Chronic migraine and Hypertension.    SURGICAL HISTORY  patient denies any surgical history    SOCIAL HISTORY  Social History     Tobacco Use   • Smoking status: Never Smoker   • Smokeless tobacco: Never Used   Vaping Use   • Vaping Use: Never used   Substance Use Topics   • Alcohol use: No   • Drug use: No      Social History     Substance and Sexual Activity   Drug Use No       FAMILY HISTORY  History reviewed. No pertinent family history.    CURRENT MEDICATIONS  Home Medications     Reviewed by Fady Sanders R.N. (Registered Nurse) on 12/25/21 at 2201  Med List Status: Complete   Medication Last Dose Status   acetaminophen (TYLENOL) 650 MG CR tablet 12/25/2021 Active   diclofenac sodium (VOLTAREN) 1 % Gel PRN Active   irbesartan-hydrochlorothiazide (AVALIDE) 300-12.5 MG per tablet 12/25/2021 Active   oxybutynin (DITROPAN) 5 MG Tab 12/24/2021 Active   traZODone (DESYREL) 50 MG Tab  "12/24/2021 Active                ALLERGIES  No Known Allergies    PHYSICAL EXAM  VITAL SIGNS: /81   Pulse 76   Temp 36.6 °C (97.9 °F) (Oral)   Resp 18   Ht 1.499 m (4' 11\")   Wt 42 kg (92 lb 9.5 oz)   SpO2 97%   BMI 18.70 kg/m²      Constitutional: Alert in no apparent distress.  HENT: No signs of trauma, Bilateral external ears normal, Nose normal.   Eyes: Pupils are equal, Conjunctiva normal, Non-icteric.   Neck: Normal range of motion, No tenderness, Supple, No stridor.   Lymphatic: No lymphadenopathy noted.   Cardiovascular: Regular rate and rhythm, nondisplaced PMI  Thorax & Lungs: No respiratory distress,  No chest tenderness.  Slightly diminished lung sounds.  Abdomen: Bowel sounds normal, Soft, No tenderness, No masses, No pulsatile masses. No peritoneal signs.  Skin: Warm, Dry, No erythema, No rash.   Back: No bony tenderness, No CVA tenderness.   Extremities: Intact distal pulses, No edema, No tenderness, No cyanosis.  Musculoskeletal: Good range of motion in all major joints. No tenderness to palpation or major deformities noted.   Neurologic: Alert , Normal motor function, Normal sensory function, No focal deficits noted.   Psychiatric: Affect normal, Judgment normal, Mood normal.     DIAGNOSTIC STUDIES / PROCEDURES    EKG Interpretation:  Interpreted by me  Sinus rate of 69 with no ST elevation, depression or T wave inversion.       LABS  Labs Reviewed   COMP METABOLIC PANEL - Abnormal; Notable for the following components:       Result Value    Co2 19 (*)     Glucose 115 (*)     Bun 51 (*)     Creatinine 3.04 (*)     Albumin 3.1 (*)     Globulin 4.0 (*)     All other components within normal limits    Narrative:     Collected By:  Indicate which anticoagulants the patient is on:->UNKNOWN   PROTHROMBIN TIME - Abnormal; Notable for the following components:    INR 1.16 (*)     All other components within normal limits    Narrative:     Collected By:  Indicate which anticoagulants the patient " is on:->UNKNOWN   TROPONIN - Abnormal; Notable for the following components:    Troponin T 55 (*)     All other components within normal limits    Narrative:     Collected By:  Indicate which anticoagulants the patient is on:->UNKNOWN   PROBRAIN NATRIURETIC PEPTIDE, NT - Abnormal; Notable for the following components:    NT-proBNP 48058 (*)     All other components within normal limits    Narrative:     Collected By:  Indicate which anticoagulants the patient is on:->UNKNOWN   CBC WITH DIFFERENTIAL - Abnormal; Notable for the following components:    RBC 3.40 (*)     Hemoglobin 9.6 (*)     Hematocrit 30.9 (*)     MCHC 31.1 (*)     RDW 54.3 (*)     Lymphocytes 20.00 (*)     Neutrophils (Absolute) 7.55 (*)     All other components within normal limits   ESTIMATED GFR - Abnormal; Notable for the following components:    GFR If  17 (*)     GFR If Non  14 (*)     All other components within normal limits    Narrative:     Collected By:  Indicate which anticoagulants the patient is on:->UNKNOWN   LIPASE    Narrative:     Collected By:  Indicate which anticoagulants the patient is on:->UNKNOWN   APTT    Narrative:     Collected By:  Indicate which anticoagulants the patient is on:->UNKNOWN   COD (ADULT)    Narrative:     Collected By:   MAGNESIUM    Narrative:     Collected By:   IRON/TOTAL IRON BIND    Narrative:     Collected By:   ABO RH CONFIRM   URINE SODIUM RANDOM   LIPID PROFILE   COMP METABOLIC PANEL   CBC WITH DIFFERENTIAL   TROPONIN   OSMOLALITY SERUM       All labs reviewed by me.    RADIOLOGY  CT-CEREBRAL PERFUSION ANALYSIS   Final Result      Nondiagnostic study, limited by motion artifact.      DX-CHEST-PORTABLE (1 VIEW)   Final Result      No acute cardiopulmonary disease.      CT-HEAD W/O   Final Result      Diffuse atrophy and white matter changes.   No acute intracranial hemorrhage or territorial infarct.            EC-ECHOCARDIOGRAM COMPLETE W/O CONT    (Results Pending)         The radiologist's interpretation of all radiological studies have been reviewed by me.    COURSE & MEDICAL DECISION MAKING  Pertinent Labs & Imaging studies reviewed. (See chart for details)    This is a 89 y.o. female that presents with what appeared to be a potential stroke versus congestive heart failure versus acute coronary syndrome versus pancreatitis.  The patient does have a NIH of 1.  She is outside the window for TPA.  The patient is a DNR.  I will get an x-ray to evaluate for pneumonia versus congestive heart failure.  A screening EKG shows no ischemia.  I will get a troponin as well as a proBNP.  A lipase to evaluate for pancreatitis.  We will get a CT of the patient's head as well as angios of the head and neck to evaluate for potential large vessel occlusion.  We will reassess after this.  On examination patient's abdomen she has no tenderness..     5:28 PM - Patient seen and examined at bedside.     Patient had a negative CT scan of the head.  The perfusion study was nondiagnostic.  The chest x-ray was negative.  The patient was found to have acute kidney injury.  The patient had multiple attempts to obtain CTAs of the head neck however the pains continue to below.  I do not believe that the patient has a large vessel occlusion and I believe these are necessary.  The patient is a slightly elevated troponin at 55 elevated proBNP of 15,000 as well as an JOHANA.  She will need to be admitted to the hospitalist in guarded condition.      The total critical care time on this patient is 40 minutes, resuscitating patient, speaking with admitting physician, and deciphering test results. This  is exclusive of separately billable procedures.      FINAL IMPRESSION  1. JOHANA (acute kidney injury) (HCC)    2. Congestive heart failure, unspecified HF chronicity, unspecified heart failure type (HCC)    3. Suspected cerebrovascular accident (CVA)              Electronically signed by: Ion Espitia M.D.,  12/25/2021

## 2021-12-26 NOTE — ASSESSMENT & PLAN NOTE
Suspect primarily prerenal with poor p.o. intake also some contribution to possible heart failure  Check serum osm and urine sodium  Consider nephrology consultation although confirmed with granddaughter that patient and family would not want dialysis  Avoid nephrotoxins  Improving continue to monitor with labs

## 2021-12-26 NOTE — PROGRESS NOTES
Tele strip printed at 1353     Rhythm: SR   HR: 77  Measurements: 0.16/0.08/0.44        Telemetry Shift Summary     Rhythm: SR  HR Range: 70's-80's  Ectopy: Occasional PVC/Couplets/Triplets, Rare PAC, 4 beats VT     Telemetry monitoring strips placed in pt's chart.

## 2021-12-26 NOTE — ED TRIAGE NOTES
"Pt completed a scheduled annual health assessment 3 days ago with no apparent changes since the previous evaluation ( \"not sure when\").  Granddaughter describes observing pt's inability to raise her left arm earlier this AM, and resolving \"within the hour.\"  Pt C/O acute onset of SOB throughout the day, and generalized mid abdominal pain lasting for the past 30 minutes.  Chief Complaint   Patient presents with   • Shortness of Breath   • Abdominal Pain   /77   Pulse 79   Temp 36.4 °C (97.6 °F) (Temporal)   Resp 18   Ht 1.499 m (4' 11\")   Wt 42 kg (92 lb 9.5 oz)   SpO2 99%   BMI 18.70 kg/m²   Has this patient been vaccinated for COVID YES  If not, would they like to be vaccinated while in the ER if eligible?  N/A  Would the patient like to speak with the ERP about the possibility of receiving the COVID vaccine today before making a decision? N/A    "

## 2021-12-26 NOTE — ED NOTES
ERP at bedside. Pt refuses finger stick. AIDET acknowledged with patient. Pt is a difficult stick. US guided IV started. Pt taken to CT.

## 2021-12-26 NOTE — PROGRESS NOTES
Telemetry Shift Summary     Rhythm: SR  Rate: 67-81  Measurements: .16/.08/.42  Ectopy (reported by Monitor Tech): r-o PVC     Normal Values  Rhythm: Sinus  HR:   Measurements: 0.12-0.20/0.06-0.10/0.30-0.52

## 2021-12-26 NOTE — CARE PLAN
The patient is Stable - Low risk of patient condition declining or worsening    Shift Goals  Clinical Goals: Diurese patient   Patient Goals: Go Home  Family Goals: Support    Progress made toward(s) clinical / shift goals:  Patient receiving lasix for diureses. No reports of pain. Patient was able to urinate using bedside commode. Patient aware of echocardiogram planned for today.       Problem: Knowledge Deficit - Standard  Goal: Patient and family/care givers will demonstrate understanding of plan of care, disease process/condition, diagnostic tests and medications  Outcome: Progressing     Problem: Skin Integrity  Goal: Skin integrity is maintained or improved  Outcome: Progressing     Problem: Fall Risk  Goal: Patient will remain free from falls  Outcome: Progressing       Patient is not progressing towards the following goals:

## 2021-12-26 NOTE — ASSESSMENT & PLAN NOTE
Her medications were held admission given soft systolic blood pressure.  She did have an acute stroke, however she is out of the 24-hour period for permissive hypertension.  As needed medications ordered however

## 2021-12-26 NOTE — H&P
Hospital Medicine History & Physical Note    Date of Service  12/25/2021    PCP: Mike Guajardo M.D.    CC:  SOB    HPI:   This is a 89 y.o. female with hx HTN and dementia, brought in by grandDTR for worsening SOB and some edema. She had not been diagnosed with kidney or heart issues in the past, she lives at home with her family and they take care of her. No recent illness, no f/c, no chest pain. Only SOB, slight edema LEs BL on exam.     Trop 55, BNP 15k  JOHANA Cr 3    I discussed this patient's case with Dr Blanc of the emergency department.     EKG is pending    Consultants:   None    ROS: As above. The remainder of a complete review of systems is negative in all systems except as noted.    PMHx:   has a past medical history of Chronic migraine and Hypertension.    PSHx:   has no past surgical history on file.    SHx:   reports that she has never smoked. She has never used smokeless tobacco. She reports that she does not drink alcohol and does not use drugs.    FHx:  Reviewed with patient, no pertinent family history noted.    Allergies:  No Known Allergies    Medications:  No current facility-administered medications on file prior to encounter.     Current Outpatient Medications on File Prior to Encounter   Medication Sig Dispense Refill   • diclofenac sodium (VOLTAREN) 1 % Gel Apply 1 g topically 1 time a day as needed (leg pain).     • traZODone (DESYREL) 50 MG Tab Take 50 mg by mouth every evening.     • oxybutynin (DITROPAN) 5 MG Tab Take 5 mg by mouth every evening.     • irbesartan-hydrochlorothiazide (AVALIDE) 300-12.5 MG per tablet Take 1 Tablet by mouth every day.     • acetaminophen (TYLENOL) 650 MG CR tablet Take 650 mg by mouth every morning. Indications: Pain         Objective Exam:  Vitals:    12/25/21 1631 12/25/21 1634 12/25/21 1845 12/25/21 2130   BP:  114/77 114/84 116/81   Pulse:  79 66 76   Resp:  18  18   Temp:  36.4 °C (97.6 °F)  36.6 °C (97.9 °F)   TempSrc:  Temporal  Oral   SpO2:  99%  "98% 97%   Weight: 42 kg (92 lb 9.5 oz)      Height: 1.499 m (4' 11\")          Physical Exam  Vitals and nursing note reviewed.   Constitutional:       General: She is not in acute distress.     Appearance: She is not ill-appearing or diaphoretic.   HENT:      Head: Normocephalic.      Nose: No congestion.      Mouth/Throat:      Mouth: Mucous membranes are moist.   Eyes:      General: No scleral icterus.     Conjunctiva/sclera: Conjunctivae normal.   Cardiovascular:      Rate and Rhythm: Normal rate and regular rhythm.      Heart sounds: Normal heart sounds.   Pulmonary:      Effort: Pulmonary effort is normal. No respiratory distress.      Breath sounds: No stridor. No wheezing.   Abdominal:      General: There is no distension.      Palpations: Abdomen is soft.      Tenderness: There is no guarding.   Musculoskeletal:         General: No tenderness or deformity.      Cervical back: Normal range of motion. No rigidity.      Right lower leg: Edema present.      Left lower leg: Edema present.   Skin:     General: Skin is warm and dry.      Findings: No erythema or rash.   Neurological:      Mental Status: She is alert. She is disoriented.      Motor: No weakness.         Laboratory--reviewed personally and are as follows:  Lab Results   Component Value Date/Time    WBC 10.6 12/25/2021 06:30 PM    RBC 3.40 (L) 12/25/2021 06:30 PM    HEMOGLOBIN 9.6 (L) 12/25/2021 06:30 PM    HEMATOCRIT 30.9 (L) 12/25/2021 06:30 PM    MCV 90.9 12/25/2021 06:30 PM    MCH 28.2 12/25/2021 06:30 PM    MCHC 31.1 (L) 12/25/2021 06:30 PM    MPV 9.4 12/25/2021 06:30 PM    NEUTSPOLYS 71.00 12/25/2021 06:30 PM    LYMPHOCYTES 20.00 (L) 12/25/2021 06:30 PM    MONOCYTES 7.30 12/25/2021 06:30 PM    EOSINOPHILS 0.50 12/25/2021 06:30 PM    BASOPHILS 0.40 12/25/2021 06:30 PM      Lab Results   Component Value Date/Time    SODIUM 137 12/25/2021 06:30 PM    POTASSIUM 4.6 12/25/2021 06:30 PM    CHLORIDE 103 12/25/2021 06:30 PM    CO2 19 (L) 12/25/2021 " 06:30 PM    GLUCOSE 115 (H) 12/25/2021 06:30 PM    BUN 51 (H) 12/25/2021 06:30 PM    CREATININE 3.04 (H) 12/25/2021 06:30 PM    CREATININE 1.2 02/10/2009 04:15 PM      Lab Results   Component Value Date/Time    PROTHROMBTM 13.9 12/25/2021 06:30 PM    INR 1.16 (H) 12/25/2021 06:30 PM        Radiology  CT-CEREBRAL PERFUSION ANALYSIS   Final Result      Nondiagnostic study, limited by motion artifact.      DX-CHEST-PORTABLE (1 VIEW)   Final Result      No acute cardiopulmonary disease.      CT-HEAD W/O   Final Result      Diffuse atrophy and white matter changes.   No acute intracranial hemorrhage or territorial infarct.            CT-CTA HEAD WITH & W/O-POST PROCESS    (Results Pending)   CT-CTA NECK WITH & W/O-POST PROCESSING    (Results Pending)   EC-ECHOCARDIOGRAM COMPLETE W/O CONT    (Results Pending)       Assessment/Plan:  * SOB (shortness of breath)  Assessment & Plan  Suspect to be related to new onset heart failure  Trial Lasix tonight, reassess in the a.m.  Check echo    JOHANA (acute kidney injury) (HCC)- (present on admission)  Assessment & Plan  Suspect primarily prerenal with poor p.o. intake also some contribution to possible heart failure  Check serum osm and urine sodium  Trial of Lasix 20 mg IV tonight, reassess in the a.m. and follow labs  Consider nephrology consultation although confirmed with granddaughter that patient and family would not want dialysis  Avoid nephrotoxins    Vitamin D deficiency- (present on admission)  Assessment & Plan  Noted    Body mass index (BMI) less than 19 in adult- (present on admission)  Assessment & Plan  Noted    Stage 3a chronic kidney disease (HCC)- (present on admission)  Assessment & Plan  History of    Normocytic anemia- (present on admission)  Assessment & Plan  Check iron panel    Essential hypertension- (present on admission)  Assessment & Plan  Holding home medications for now given soft systolic blood pressure and initiation of Lasix  As needed medications  ordered however       It is expected patient will stay beyond 2 midnights.    VTE prophylaxis: heparin

## 2021-12-26 NOTE — PROGRESS NOTES
Assumed report and patient care from DEANNA Shrestha. Patient is resting comfortably in bed in no signs of pain or distress. No questions or concerns at this time. Safety measures in place, call light within reach.

## 2021-12-26 NOTE — ASSESSMENT & PLAN NOTE
Patient is alert and oriented on exam, very weak bilaterally but did seem to have more weakness on the left side and some pronator drift.  I do not think a full stroke work-up is necessary at this time as the symptoms seem to be subacute and she is out of the treatment period for any acute treatments.    CT head on admission showed diffuse atrophy and white matter changes.  No acute intracranial hemorrhage or territorial infarct  See MRI results above  Follow-up with PT/OT  Monitor neurological status

## 2021-12-27 PROBLEM — I63.9 CEREBROVASCULAR ACCIDENT (CVA) DUE TO EMBOLISM (HCC): Status: ACTIVE | Noted: 2021-01-01

## 2021-12-27 PROBLEM — R45.851 SUICIDAL IDEATIONS: Status: ACTIVE | Noted: 2021-01-01

## 2021-12-27 NOTE — PROGRESS NOTES
"Hospital Medicine Daily Progress Note    Date of Service  12/27/2021    Chief Complaint  Julito Hinton is a 89 y.o. female admitted 12/25/2021 with weakness and shortness of breath    Hospital Course  Per notes, \"89 y.o. female with hx HTN and dementia, brought in by grandDTR for worsening SOB and some edema. She had not been diagnosed with kidney or heart issues in the past, she lives at home with her family and they take care of her. No recent illness, no f/c, no chest pain. Only SOB, slight edema LEs BL on exam.\"    Patient was admitted and evaluated overnight for shortness of breath and edema.  On evaluation next morning, 12/26, patient's family did report that she had had a fall little over a week ago, and one of the initial reason they brought her to the hospital was because she seemed weaker on left side, they had also noticed some facial droop on her left side and they were concerned for stroke.  She did have CT scans on admission, no acute findings but was eventually admitted for volume overload and possible CHF work-up.      Interval Problem Update  12/26: Patient is alert and oriented on exam, very weak bilaterally but did seem to have more weakness on the left side and some pronator drift.  I do not think a full  stroke work-up is necessary at this time as the symptoms seem to be subacute and she is out of the treatment.  For any acute treatments.  However, I will get an MRI today to rule out recent stroke for secondary prevention treatment purposes.  Additionally PT/OT will see her.  Patient passed a bedside swallow test to speech therapy necessary at this time.    Also on exam found to have bilateral lower extremity edema and some respiratory crackles, pending echocardiogram.    12/27: MRI positive for stroke, likely embolic. Discussed with neurology, recommended completing stroke work-up, not a candidate for contrast studies so we will get Dopplers. Likely has undiagnosed A. fib and will need " "monitor upon discharge. Patient seems very depressed today, states she wishes to \"die \"because she no longer wishes to be in the hospital. She does not have a plan and I do not think needs a hold at this time as she is very debilitated. However we will get psychiatric evaluation and one-to-one sitter for suicide precautions until she can be cleared by psychiatry. Will likely need skilled nursing, referred. We will also get palliative consultation as she is a possible hospice candidate if she no longer wishes to be in the hospital for medical management     I have personally seen and examined the patient at bedside. I discussed the plan of care with patient, family, bedside RN, charge RN and .    Consultants/Specialty  Neurology  Psychiatry    Code Status  DNAR/DNI    Disposition  Patient is not medically cleared for discharge.   Anticipate discharge to to home with organized home healthcare and close outpatient follow-up.  I have placed the appropriate orders for post-discharge needs.    Review of Systems  Review of Systems   Respiratory: Positive for shortness of breath.    Musculoskeletal: Positive for falls.   Neurological: Positive for focal weakness and weakness. Negative for speech change and loss of consciousness.   All other systems reviewed and are negative.       Physical Exam  Temp:  [36.3 °C (97.3 °F)-36.9 °C (98.4 °F)] 36.4 °C (97.5 °F)  Pulse:  [79-84] 79  Resp:  [16-18] 17  BP: ()/(65-83) 109/65  SpO2:  [95 %-99 %] 98 %    Physical Exam  Vitals and nursing note reviewed.   Constitutional:       Appearance: Normal appearance.      Comments: Muscle wasting, alert, not in acute distress.   Cardiovascular:      Pulses: Normal pulses.      Heart sounds: Normal heart sounds.   Pulmonary:      Effort: Pulmonary effort is normal.      Breath sounds: Normal breath sounds.      Comments: Bibasilar crackles  Abdominal:      General: Abdomen is flat. Bowel sounds are normal.   Musculoskeletal: "      Right lower leg: Edema present.      Left lower leg: Edema present.   Skin:     General: Skin is warm and dry.   Neurological:      General: No focal deficit present.      Mental Status: She is alert and oriented to person, place, and time. Mental status is at baseline.      Motor: Weakness present.      Comments: Did seem to have more weakness on left side, but difficult to assess.  Also seem to have a pronator drift on left side again difficult to assess due to global weakness.  No other focalized deficits         Fluids    Intake/Output Summary (Last 24 hours) at 12/27/2021 1529  Last data filed at 12/27/2021 0600  Gross per 24 hour   Intake 340 ml   Output 200 ml   Net 140 ml       Laboratory  Recent Labs     12/25/21 1830 12/25/21 2330 12/27/21  0523   WBC 10.6 11.5* 7.8   RBC 3.40* 3.19* 3.96*   HEMOGLOBIN 9.6* 9.1* 11.0*   HEMATOCRIT 30.9* 28.0* 36.1*   MCV 90.9 87.8 91.2   MCH 28.2 28.5 27.8   MCHC 31.1* 32.5* 30.5*   RDW 54.3* 52.3* 55.6*   PLATELETCT 356 361 391   MPV 9.4 9.5 9.5     Recent Labs     12/25/21 1830 12/25/21 2330 12/27/21  0523   SODIUM 137 135 138   POTASSIUM 4.6 4.3 4.2   CHLORIDE 103 104 101   CO2 19* 17* 20   GLUCOSE 115* 114* 88   BUN 51* 50* 53*   CREATININE 3.04* 2.73* 3.04*   CALCIUM 8.5 8.3* 9.1     Recent Labs     12/25/21 1830   APTT 35.1   INR 1.16*         Recent Labs     12/25/21 2330   TRIGLYCERIDE 119   HDL 41   *       Imaging  US-CAROTID DOPPLER BILAT   Final Result      MR-BRAIN-W/O   Final Result      1.  'S of acute ischemia in the BILATERAL hilar RIGHT frontal lobe   2.  No hemorrhage   3.  Atrophy   4.  White matter changes      EC-ECHOCARDIOGRAM COMPLETE W/O CONT   Final Result      CT-CEREBRAL PERFUSION ANALYSIS   Final Result      Nondiagnostic study, limited by motion artifact.      DX-CHEST-PORTABLE (1 VIEW)   Final Result      No acute cardiopulmonary disease.      CT-HEAD W/O   Final Result      Diffuse atrophy and white matter changes.   No  "acute intracranial hemorrhage or territorial infarct.                 Assessment/Plan  * SOB (shortness of breath)  Assessment & Plan  Suspect to be related to new onset heart failure  DC Lasix  Echocardiogram showing intermediate diastolic dysfunction and EF of 70%    Suicidal ideations  Assessment & Plan  Patient reporting tenderness she wishes to \"die \"because she normally wishes to be in the hospital.   Does not have a plan for suicide but is asking the nurse to give her medication so she can \"go \"  One-to-one sitter  -Note that she needs legal hold as she cannot physically leave hospital but will get psychiatry evaluation  -We will also ask for palliative care is is possible she just no longer wishes for medical management.    Cerebrovascular accident (CVA) due to embolism (HCC)- (present on admission)  Assessment & Plan  MRI on 12/26 ordered after patient patient's family reported unilateral weakness prior to admission. She was no longer in the timeframe for any acute treatment. MRI hospital for acute ischemia in bilateral hilar white frontal lobe.  Discussed with neurology, Dr. Doty, recommended aspirin, atorvastatin, Dopplers and follow-up in the stroke clinic  PT/OT recommending SNF  .    Weakness- (present on admission)  Assessment & Plan  Patient is alert and oriented on exam, very weak bilaterally but did seem to have more weakness on the left side and some pronator drift.  I do not think a full stroke work-up is necessary at this time as the symptoms seem to be subacute and she is out of the treatment period for any acute treatments.    CT head on admission showed diffuse atrophy and white matter changes.  No acute intracranial hemorrhage or territorial infarct  See MRI results above  Follow-up with PT/OT  Monitor neurological status      JOHANA (acute kidney injury) (HCC)- (present on admission)  Assessment & Plan  Suspect primarily prerenal with poor p.o. intake also some contribution to possible heart " failure  Check serum osm and urine sodium  Consider nephrology consultation although confirmed with granddaughter that patient and family would not want dialysis  Avoid nephrotoxins  Improving continue to monitor with labs    Vitamin D deficiency- (present on admission)  Assessment & Plan  Noted    Body mass index (BMI) less than 19 in adult- (present on admission)  Assessment & Plan  Noted    Stage 3a chronic kidney disease (HCC)- (present on admission)  Assessment & Plan  History of    Normocytic anemia- (present on admission)  Assessment & Plan  Check iron panel    Essential hypertension- (present on admission)  Assessment & Plan  Her medications were held admission given soft systolic blood pressure.  She did have an acute stroke, however she is out of the 24-hour period for permissive hypertension.  As needed medications ordered however       VTE prophylaxis: SCDs/TEDs and heparin ppx    I have performed a physical exam and reviewed and updated ROS and Plan today (12/27/2021). In review of yesterday's note (12/26/2021), there are no changes except as documented above.

## 2021-12-27 NOTE — CARE PLAN
The patient is Stable - Low risk of patient condition declining or worsening    Shift Goals  Clinical Goals: safety, fall prevention  Patient Goals: go home  Family Goals: Support    Progress made toward(s) clinical / shift goals:      Problem: Knowledge Deficit - Standard  Goal: Patient and family/care givers will demonstrate understanding of plan of care, disease process/condition, diagnostic tests and medications  Outcome: Progressing     Problem: Fall Risk  Goal: Patient will remain free from falls  Outcome: Progressing     Problem: Communication  Goal: The ability to communicate needs accurately and effectively will improve  Outcome: Progressing       Patient is not progressing towards the following goals:      Problem: Gastrointestinal Irritability  Goal: Diarrhea will be absent or improved  Outcome: Not Met   Patient having episodes of diarrhea this am. MD aware.   Patient calls appropriately to ask for assistance to use the restroom.

## 2021-12-27 NOTE — ASSESSMENT & PLAN NOTE
"Patient reporting tenderness she wishes to \"die \"because she normally wishes to be in the hospital.   Does not have a plan for suicide but is asking the nurse to give her medication so she can \"go \"  One-to-one sitter  -Note that she needs legal hold as she cannot physically leave hospital but will get psychiatry evaluation  -We will also ask for palliative care is is possible she just no longer wishes for medical management.  "

## 2021-12-27 NOTE — PROGRESS NOTES
Pt repeatedly informing this RN that she wants to go home. Pt is AOx4, VS stable, though she is experiencing a moderate to severe amount of anxiety. Granddaughter at bedside. Physician notified, new orders received.

## 2021-12-27 NOTE — PROGRESS NOTES
Patients daughter Nae called for updates.   Patient said it was ok for RN to provide updates to daughter, Nae.

## 2021-12-27 NOTE — ASSESSMENT & PLAN NOTE
MRI on 12/26 ordered after patient patient's family reported unilateral weakness prior to admission. She was no longer in the timeframe for any acute treatment. MRI hospital for acute ischemia in bilateral hilar white frontal lobe.  Discussed with neurology, Dr. Doty, recommended aspirin, atorvastatin, Dopplers and follow-up in the stroke clinic  PT/OT recommending SNF  .

## 2021-12-27 NOTE — PROGRESS NOTES
MD notified patient having frequent loose mucousy stools this am.   MD holding senna at this time. No further orders received.

## 2021-12-27 NOTE — PROGRESS NOTES
Pt had a very quiet night and voiced no complaints,  Very pleasant to care for,  No changes to note

## 2021-12-27 NOTE — PROGRESS NOTES
Report received from DEANNA Fenton. Pt resting comfortably in bed at the time of report. Plan of care discussed at bedside. Safety precautions in place and call light within reach, no needs at this time.

## 2021-12-27 NOTE — PROGRESS NOTES
"Patient stated to this RN \"I am going to kill myself, give me all the medicine I will take all the medicine\".   RN spoke with patient and asked why she is having these thoughts and the patient said \"I want to kill myself, I hate being here\".    MD Sanchez notified that patient is having thoughts of suicide with plan to do so. No new orders received at this time.  Charge RN also notified.   "

## 2021-12-27 NOTE — THERAPY
"Occupational Therapy   Initial Evaluation     Patient Name: Julito Hinton  Age:  89 y.o., Sex:  female  Medical Record #: [id number]  Today's Date: 12/27/2021     Precautions  Precautions: Fall Risk    Assessment  Patient is 89 y.o. female with a diagnosis of GLF about a week ago, family noted L sided weakness, LLE appeared to be buckling, and LUE appeared to be weaker.  Pt was unable to walk at home.  Pt has h/o dementia, family looks after her at home.  Currently pt does present with some subtle L sided weakness in LUE, and generalized weakness and significant PAIN in the sole of her LEFT FOOT which is limiting her ability to stand and take steps.  Pt currently is max/total assist for all self care tasks, and is unable to take steps or transfer.  She requires 2 person assist to stand at bedside.  At this time, pt is well below baseline and is NOT safe for d/c home.  Pt will benefit from further inpt post acute therapy prior to home.  Also needs further assessment of pain in sole of L foot as this may impact her Rehab potential.  Pt denies pain in ankle- just reports pain on bottom of foot during standing/weightbearing.  OT will follow while in house.    Plan    Recommend Occupational Therapy 3 times per week until therapy goals are met for the following treatments:  Adaptive Equipment, Neuro Re-Education / Balance, Self Care/Activities of Daily Living, Therapeutic Activities and Therapeutic Exercises.    DC Equipment Recommendations: Unable to determine at this time  Discharge Recommendations: Recommend post-acute placement for additional occupational therapy services prior to discharge home     Subjective    \"I am at the hospital\"     Objective       12/27/21 1125   Prior Living Situation   Prior Services Intermittent Physical Support for ADL Per Family   Housing / Facility 1 Story Apartment / Condo   Steps Into Home 0   Steps In Home 0   Bathroom Set up Bathtub / Shower Combination;Shower Chair  (refuses to " use shower chair, likes tub baths)   Equipment Owned 4-Wheel Walker;Tub / Shower Seat   Lives with - Patient's Self Care Capacity Adult Children;Other (Comments)   Comments Pt lives with her daughter and her son.  Son was present in beginning of session and reports that someone is home nearly all the time   Prior Level of ADL Function   Self Feeding Independent   Grooming / Hygiene Requires Assist   Bathing Requires Assist   Dressing Requires Assist   Toileting Requires Assist   Comments Brother reports they assist with in and out of the bathtub, dressing and that she is incontinent and wears Depends at baseline   Prior Level of IADL Function   Medication Management Requires Assist   Laundry Requires Assist   Kitchen Mobility Requires Assist   Finances Requires Assist   Home Management Requires Assist   Shopping Requires Assist   Prior Level Of Mobility Supervision With Device in Home  (4ww in the home)   Driving / Transportation Relatives / Others Provide Transportation   Occupation (Pre-Hospital Vocational) Retired Due To Age   Leisure Interests Unable To Determine At This Time   History of Falls   History of Falls Yes   Date of Last Fall   (approx 1 week ago per chart)   Precautions   Precautions Fall Risk   Vitals   O2 Delivery Device None - Room Air   Pain 0 - 10 Group   Location Foot   Location Orientation Left   Description Sore;Tender   Therapist Pain Assessment During Activity;Nurse Notified  (Pt complained on pain on sole of foot on L in standing)   Cognition    Cognition / Consciousness X   Level of Consciousness Alert   Comments Pt oriented to hospital, states she lives in [place],  Was able to report that she lives with dtr, Brother and a cat.  She has dementia at baseline, questionable historian   Active ROM Upper Body   Active ROM Upper Body  X   Dominant Hand Right   Comments L shoulder flexion less than R, limited by pain   Strength Upper Body   Upper Body Strength  X   Comments Slightly  "decreased strength LUE compared to RUE - notably with triceps and  strength, otherwise grossly symmetrical   Sensation Upper Body   Upper Extremity Sensation    (grossly intat BUE)   Upper Body Muscle Tone   Upper Body Muscle Tone  WDL   Coordination Upper Body   Coordination Not Tested   Comments Pt required signficant encouragement to participate in eval, limited compliance with UE testing due to being cold and wanting to stay wrapped in a blanket   Balance Assessment   Sitting Balance (Static) Fair -   Sitting Balance (Dynamic) Poor +   Standing Balance (Static) Poor -   Standing Balance (Dynamic) Trace   Weight Shift Sitting Poor   Weight Shift Standing Poor   Comments MAX A for standing balance, not tolerating WB on L foot 2/2 pain   Bed Mobility    Supine to Sit Maximal Assist   Sit to Supine Maximal Assist   Scooting Maximal Assist   ADL Assessment   Grooming Maximal Assist   Upper Body Dressing Maximal Assist   Lower Body Dressing Maximal Assist   Toileting Total Assist   Comments pt has been excessively incontinent of urine and stool through the am per CNA, now with brief and purewik in place.  Was able to verbalize \"I am peeing\"   How much help from another person does the patient currently need...   Putting on and taking off regular lower body clothing? 2   Bathing (including washing, rinsing, and drying)? 2   Toileting, which includes using a toilet, bedpan, or urinal? 2   Putting on and taking off regular upper body clothing? 2   Taking care of personal grooming such as brushing teeth? 2   Eating meals? 2   6 Clicks Daily Activity Score 12   Modified [place] (mRS)   Modified [place] Score 4   Functional Mobility   Sit to Stand Maximal Assist  (x2 person assist)   Bed, Chair, Wheelchair Transfer Unable to Participate   Toilet Transfers Unable to Participate   Mobility stood EOB, MaxA to wt shift and move feet slightly to R   Distance (Feet) 1   # of Times Distance was Traveled 1   Visual Perception "   Comments appears grossly intact   Activity Tolerance   Sitting Edge of Bed 5 min   Standing 1 min   Comments limited by pain and fatigue   Patient / Family Goals   Patient / Family Goal #1 family wants her better   Short Term Goals   Short Term Goal # 1 Pt will transfer to commode with Constanza in 4 visits   Short Term Goal # 2 Pt will groom with Constanza seated in 4 visits   Short Term Goal # 3 Pt will dress UB and LB with modA in supported sitting in 5 visits

## 2021-12-27 NOTE — CARE PLAN
The patient is     Shift Goals  Clinical Goals: safety/prevent falls  Patient Goals: go home  Family Goals: Support    Progress made toward(s) clinical / shift goals:     Patient is not progressing towards the following goals:

## 2021-12-27 NOTE — CARE PLAN
The patient is Stable - Low risk of patient condition declining or worsening    Shift Goals  Clinical Goals: safety/prevent falls  Patient Goals: go home    Progress made toward(s) clinical / shift goals:  Pt educated of fall risk/precautions including bed alarms, need to call before getting up. Collaborated with physician for PT/OT eval.     Patient is not progressing towards the following goals: n/a

## 2021-12-28 NOTE — CARE PLAN
"  Problem: Skin Integrity  Goal: Skin integrity is maintained or improved  Outcome: Progressing     Problem: Fall Risk  Goal: Patient will remain free from falls  Outcome: Progressing     Problem: Psychosocial  Goal: Patient's level of anxiety will decrease  Outcome: Progressing     Problem: Communication  Goal: The ability to communicate needs accurately and effectively will improve  Outcome: Progressing     Problem: Dysphagia  Goal: Dysphagia will improve  Outcome: Progressing  Note: Pt did not have any episodes of aspiration or difficulty swallowing. Pt did not eat during shift, slept most of the time. With am meds, sat up to 90' for 81mg aspirin, coached to take small sips of water, no signs of symptoms of aspiration.      Problem: Infection - Standard  Goal: Patient will remain free from infection  Outcome: Progressing   The patient is Stable - Low risk of patient condition declining or worsening    Shift Goals  Clinical Goals: monitor neuro status, no falls, safety, rest  Patient Goals: monitor neuro status, no falls, gohome   Family Goals: Support    Progress made toward(s) clinical / shift goals:  No increased neuro S/S; scored 2 on NIHSS mostly R/T slight L sided weakness with arm and leg drift, compared to R. Pt was also very tired and \"weakness\" could have been related to fatigue.     Patient is not progressing towards the following goals:      "

## 2021-12-28 NOTE — DISCHARGE INSTRUCTIONS
Discharge Instructions    Discharged to home by car with relative. Discharged via wheelchair, hospital escort: Yes.  Special equipment needed: Not Applicable    Be sure to schedule a follow-up appointment with your primary care doctor or any specialists as instructed.     Discharge Plan:   Diet Plan: Discussed  Activity Level: Discussed  Confirmed Follow up Appointment: Appointment Scheduled  Confirmed Symptoms Management: Discussed  Medication Reconciliation Updated: Yes  Influenza Vaccine Indication: Patient Refuses    I understand that a diet low in cholesterol, fat, and sodium is recommended for good health. Unless I have been given specific instructions below for another diet, I accept this instruction as my diet prescription.   Other diet: as tolerated    Special Instructions:     Stroke/CVA/TIA/Hemorrhagic Ischemia Discharge Instructions  You have had a stroke. Your risk factors have been identified as follows:  Age - Over 55  High blood pressure  It is important that you reduce your risk factors to avoid another stroke in the future. Here are some general guidelines to follow:  · Eat healthy - avoid food high in fat.  · Get regular exercise.  · Maintain a healthy weight.  · Avoid smoking.  · Avoid alcohol and illegal drug use.  · Take your medications as directed.  For more information regarding risk factors, refer to pages 17-19 in your Stroke Patient Education Guide. Stroke Education Guide was given to patient and family member.    Warning signs of a stroke include (which can also be found on page 3 of your Stroke Patient Education Guide):  · Sudden numbness of weakness of the face, arm or leg (especially on one side of the body).  · Sudden confusion, trouble speaking or understanding.  · Sudden trouble seeing in one or both eyes.  · Sudden trouble walking, dizziness, loss of balance or coordination.  · Sudden severe headache with no known cause.  It is very important to get treatment quickly when a stroke  occurs. If you experience any of the above warning signs, call [phone] immediately.     Some patients who have had a stroke will be going home on a blood thinner medication called Warfarin (Coumadin).  This medication requires very close monitoring and follow up.  This follow up can be provided by either your Primary Care Physician or by [facility] Outpatient Anticoagulation Service.  The Outpatient Anticoagulation Service is located at the [facility] ([facility]).  If you do not know when your follow up appointment is scheduled, call [phone] to verify your appointment time.      · Is patient discharged on Warfarin / Coumadin?   No     Depression / Suicide Risk    As you are discharged from this [facility], it is important to learn how to keep safe from harming yourself.    Recognize the warning signs:  · Abrupt changes in personality, positive or negative- including increase in energy   · Giving away possessions  · Change in eating patterns- significant weight changes-  positive or negative  · Change in sleeping patterns- unable to sleep or sleeping all the time   · Unwillingness or inability to communicate  · Depression  · Unusual sadness, discouragement and loneliness  · Talk of wanting to die  · Neglect of personal appearance   · Rebelliousness- reckless behavior  · Withdrawal from people/activities they love  · Confusion- inability to concentrate     If you or a loved one observes any of these behaviors or has concerns about self-harm, here's what you can do:  · Talk about it- your feelings and reasons for harming yourself  · Remove any means that you might use to hurt yourself (examples: pills, rope, extension cords, firearm)  · Get professional help from the community (Mental Health, Substance Abuse, psychological counseling)  · Do not be alone:Call your Safe Contact- someone whom you trust who will be there for you.  · Call your  local CRISIS HOTLINE 001-9871 or 836-604-8807  · Call your local Children's Mobile Crisis Response Team [place] (809) 575-4078 or www.[employer]  · Call the toll free National Suicide Prevention Hotlines   · National Suicide Prevention Lifeline 918-554-KXNB (3054)  · [facility] Line Network 800-SUICIDE (917-6785)      Stroke Prevention  Some medical conditions and lifestyle choices can lead to a higher risk for a stroke. You can help to prevent a stroke by making nutrition, lifestyle, and other changes.  What nutrition changes can be made?    · Eat healthy foods.  ? Choose foods that are high in fiber. These include:  § Fresh fruits.  § Fresh vegetables.  § Whole grains.  ? Eat at least 5 or more servings of fruits and vegetables each day. Try to fill half of your plate at each meal with fruits and vegetables.  ? Choose lean protein foods. These include:  § Lowfat (lean) cuts of meat.  § Chicken without skin.  § Fish.  § Tofu.  § Beans.  § Nuts.  ? Eat low-fat dairy products.  ? Avoid foods that:  § Are high in salt (sodium).  § Have saturated fat.  § Have trans fat.  § Have cholesterol.  § Are processed.  § Are premade.  · Follow eating guidelines as told by your doctor. These may include:  ? Reducing how many calories you eat and drink each day.  ? Limiting how much salt you eat or drink each day to 1,500 milligrams (mg).  ? Using only healthy fats for cooking. These include:  § Olive oil.  § Canola oil.  § Sunflower oil.  ? Counting how many carbohydrates you eat and drink each day.  What lifestyle changes can be made?  · Try to stay at a healthy weight. Talk to your doctor about what a good weight is for you.  · Get at least 30 minutes of moderate physical activity at least 5 days a week. This can include:  ? Fast walking.  ? Biking.  ? Swimming.  · Do not use any products that have nicotine or tobacco. This includes cigarettes and e-cigarettes. If you need help quitting, ask your doctor. Avoid  being around tobacco smoke in general.  · Limit how much alcohol you drink to no more than 1 drink a day for nonpregnant women and 2 drinks a day for men. One drink equals 12 oz of beer, 5 oz of wine, or 1½ oz of hard liquor.  · Do not use drugs.  · Avoid taking birth control pills. Talk to your doctor about the risks of taking birth control pills if:  ? You are over 35 years old.  ? You smoke.  ? You get migraines.  ? You have had a blood clot.  What other changes can be made?  · Manage your cholesterol.  ? It is important to eat a healthy diet.  ? If your cholesterol cannot be managed through your diet, you may also need to take medicines. Take medicines as told by your doctor.  · Manage your diabetes.  ? It is important to eat a healthy diet and to exercise regularly.  ? If your blood sugar cannot be managed through diet and exercise, you may need to take medicines. Take medicines as told by your doctor.  · Control your high blood pressure (hypertension).  ? Try to keep your blood pressure below 130/80. This can help lower your risk of stroke.  ? It is important to eat a healthy diet and to exercise regularly.  ? If your blood pressure cannot be managed through diet and exercise, you may need to take medicines. Take medicines as told by your doctor.  ? Ask your doctor if you should check your blood pressure at home.  ? Have your blood pressure checked every year. Do this even if your blood pressure is normal.  · Talk to your doctor about getting checked for a sleep disorder. Signs of this can include:  ? Snoring a lot.  ? Feeling very tired.  · Take over-the-counter and prescription medicines only as told by your doctor. These may include aspirin or blood thinners (antiplatelets or anticoagulants).  · Make sure that any other medical conditions you have are managed.  Where to find more information  · American Stroke Association: www.strokeassociation.org  · National Stroke Association: www.stroke.org  Get help  "right away if:  · You have any symptoms of stroke. \"BE FAST\" is an easy way to remember the main warning signs:  ? B - Balance. Signs are dizziness, sudden trouble walking, or loss of balance.  ? E - Eyes. Signs are trouble seeing or a sudden change in how you see.  ? F - Face. Signs are sudden weakness or loss of feeling of the face, or the face or eyelid drooping on one side.  ? A - Arms. Signs are weakness or loss of feeling in an arm. This happens suddenly and usually on one side of the body.  ? S - Speech. Signs are sudden trouble speaking, slurred speech, or trouble understanding what people say.  ? T - Time. Time to call emergency services. Write down what time symptoms started.  · You have other signs of stroke, such as:  ? A sudden, very bad headache with no known cause.  ? Feeling sick to your stomach (nausea).  ? Throwing up (vomiting).  ? Jerky movements you cannot control (seizure).  These symptoms may represent a serious problem that is an emergency. Do not wait to see if the symptoms will go away. Get medical help right away. Call your local emergency services (911 in the [place] Do not drive yourself to the hospital.  Summary  · You can prevent a stroke by eating healthy, exercising, not smoking, drinking less alcohol, and treating other health problems, such as diabetes, high blood pressure, or high cholesterol.  · Do not use any products that contain nicotine or tobacco, such as cigarettes and e-cigarettes.  · Get help right away if you have any signs or symptoms of a stroke.  This information is not intended to replace advice given to you by your health care provider. Make sure you discuss any questions you have with your health care provider.  Document Released: 06/18/2013 Document Revised: 02/13/2020 Document Reviewed: 03/21/2018  Elsevier Patient Education © 2020 Elsevier Inc.      Eating Plan After Stroke  A stroke causes damage to the brain cells, which can affect your ability to walk, talk, " and even eat. The impact of a stroke is different for everyone, and so is recovery. A good nutrition plan is important for your recovery. It can also lower your risk of another stroke.  If you have difficulty chewing and swallowing your food, a dietitian or your stroke care team can help so that you can enjoy eating healthy foods.  What are tips for following this plan?    Reading food labels  · Choose foods that have less than 300 milligrams (mg) of sodium per serving. Limit your sodium intake to less than 1,500 mg per day.  · Avoid foods that have saturated fat and trans fat.  · Choose foods that are low in cholesterol. Limit the amount of cholesterol you eat each day to less than 200 mg.  · Choose foods that are high in fiber. Eat 20-30 grams (g) of fiber each day.  · Avoid foods with added sugar. Check the food label for ingredients such as sugar, corn syrup, honey, fructose, molasses, and cane juice.  Shopping  · At the grocery store, buy most of your food from areas near the walls of the store. This includes:  ? Fresh fruits and vegetables.  ? Dry grains, beans, nuts, and seeds.  ? Fresh seafood, poultry, lean meats, and eggs.  ? Low-fat dairy products.  · Buy whole ingredients instead of prepackaged foods.  · Buy fresh, in-season fruits and vegetables from local farmers markets.  · Buy frozen fruits and vegetables in resealable bags.  Cooking  · Prepare foods with very little salt. Use herbs or salt-free spices instead.  · Cook with heart-healthy oils, such as olive, avocado, canola, soybean, or sunflower oil.  · Avoid frying foods. Bake, grill, or broil foods instead.  · Remove visible fat and skin from meat and poultry before eating.  · Modify food textures as told by your health care provider.  Meal planning  · Eat a wide variety of colorful fruits and vegetables. Make sure one-half of your plate is filled with fruits and vegetables at each meal.  · Eat fruits and vegetables that are high in potassium,  such as:  ? Apples, bananas, oranges, and melon.  ? Sweet potatoes, spinach, zucchini, and tomatoes.  · Eat fish that contain heart-healthy fats (omega-3 fats) at least twice a week. These include salmon, tuna, mackerel, and sardines.  · Eat plant foods that are high in omega-3 fats, such as flaxseeds and walnuts. Add these to cereals, yogurt, or pasta dishes.  · Eat several servings of high-fiber foods each day, such as fruits, vegetables, whole grains, and beans.  · Do not put salt at the table for meals.  · When eating out at restaurants:  ? Ask the  about low-salt or salt-free food options.  ? Avoid fried foods. Look for menu items that are grilled, steamed, broiled, or roasted.  ? Ask if your food can be prepared without butter.  ? Ask for condiments, such as salad dressings, gravy, or sauces to be served on the side.  · If you have difficulty swallowing:  ? Choose foods that are softer and easier to chew and swallow.  ? Cut foods into small pieces and chew well before swallowing.  ? Thicken liquids as told by your health care provider or dietitian.  ? Let your health care provider know if your condition does not improve over time. You may need to work with a speech therapist to re-train the muscles that are used for eating.  General recommendations  · Involve your family and friends in your recovery, if possible. It may be helpful to have a slower meal time and to plan meals that include foods everyone in the family can eat.  · Brush your teeth with fluoride toothpaste twice a day, and floss once a day. Keeping a clean mouth can help you swallow and can also help your appetite.  · Drink enough water each day to keep your urine pale yellow. If needed, set reminders or ask your family to help you remember to drink water.  · Limit alcohol intake to no more than 1 drink a day for nonpregnant women and 2 drinks a day for men. One drink equals 12 oz of beer, 5 oz of wine, or 1½ oz of hard  liquor.  Summary  · Following this eating plan can help in your stroke recovery and can decrease your risk for another stroke.  · Let your health care provider know if you have problems with swallowing. You may need to work with a speech therapist.  This information is not intended to replace advice given to you by your health care provider. Make sure you discuss any questions you have with your health care provider.  Document Released: 02/25/2019 Document Revised: 04/09/2020 Document Reviewed: 02/25/2019  [employer] Patient Education © 2020 Elsevier Inc.    Atorvastatin tablets  What is this medicine?  ATORVASTATIN (a TORE va sta tin) is known as a HMG-CoA reductase inhibitor or 'statin'. It lowers the level of cholesterol and triglycerides in the blood. This drug may also reduce the risk of heart attack, stroke, or other health problems in patients with risk factors for heart disease. Diet and lifestyle changes are often used with this drug.  This medicine may be used for other purposes; ask your health care provider or pharmacist if you have questions.  COMMON BRAND NAME(S): Lipitor  What should I tell my health care provider before I take this medicine?  They need to know if you have any of these conditions:  · diabetes  · if you often drink alcohol  · history of stroke  · kidney disease  · liver disease  · muscle aches or weakness  · thyroid disease  · an unusual or allergic reaction to atorvastatin, other medicines, foods, dyes, or preservatives  · pregnant or trying to get pregnant  · breast-feeding  How should I use this medicine?  Take this medicine by mouth with a glass of water. Follow the directions on the prescription label. You can take it with or without food. If it upsets your stomach, take it with food. Do not take with grapefruit juice. Take your medicine at regular intervals. Do not take it more often than directed. Do not stop taking except on your doctor's advice.  Talk to your pediatrician  regarding the use of this medicine in children. While this drug may be prescribed for children as young as 10 for selected conditions, precautions do apply.  Overdosage: If you think you have taken too much of this medicine contact a poison control center or emergency room at once.  NOTE: This medicine is only for you. Do not share this medicine with others.  What if I miss a dose?  If you miss a dose, take it as soon as you can. If your next dose is to be taken in less than 12 hours, then do not take the missed dose. Take the next dose at your regular time. Do not take double or extra doses.  What may interact with this medicine?  Do not take this medicine with any of the following medications:  · dasabuvir; ombitasvir; paritaprevir; ritonavir  · ombitasvir; paritaprevir; ritonavir  · posaconazole  · red yeast rice  This medicine may also interact with the following medications:  · alcohol  · birth control pills  · certain antibiotics like erythromycin and clarithromycin  · certain antivirals for HIV or hepatitis  · certain medicines for cholesterol like fenofibrate, gemfibrozil, and niacin  · certain medicines for fungal infections like ketoconazole and itraconazole  · colchicine  · cyclosporine  · digoxin  · grapefruit juice  · rifampin  This list may not describe all possible interactions. Give your health care provider a list of all the medicines, herbs, non-prescription drugs, or dietary supplements you use. Also tell them if you smoke, drink alcohol, or use illegal drugs. Some items may interact with your medicine.  What should I watch for while using this medicine?  Visit your doctor or health care professional for regular check-ups. You may need regular tests to make sure your liver is working properly.  Your health care professional may tell you to stop taking this medicine if you develop muscle problems. If your muscle problems do not go away after stopping this medicine, contact your health care  professional.  Do not become pregnant while taking this medicine. Women should inform their health care professional if they wish to become pregnant or think they might be pregnant. There is a potential for serious side effects to an unborn child. Talk to your health care professional or pharmacist for more information. Do not breast-feed an infant while taking this medicine.  This medicine may increase blood sugar. Ask your healthcare provider if changes in diet or medicines are needed if you have diabetes.  If you are going to need surgery or other procedure, tell your doctor that you are using this medicine.  This drug is only part of a total heart-health program. Your doctor or a dietician can suggest a low-cholesterol and low-fat diet to help. Avoid alcohol and smoking, and keep a proper exercise schedule.  This medicine may cause a decrease in Co-Enzyme Q-10. You should make sure that you get enough Co-Enzyme Q-10 while you are taking this medicine. Discuss the foods you eat and the vitamins you take with your health care professional.  What side effects may I notice from receiving this medicine?  Side effects that you should report to your doctor or health care professional as soon as possible:  · allergic reactions like skin rash, itching or hives, swelling of the face, lips, or tongue  · fever  · joint pain  · loss of memory  · redness, blistering, peeling or loosening of the skin, including inside the mouth  · signs and symptoms of high blood sugar such as being more thirsty or hungry or having to urinate more than normal. You may also feel very tired or have blurry vision.  · signs and symptoms of liver injury like dark yellow or brown urine; general ill feeling or flu-like symptoms; light-belly pain; unusually weak or tired; yellowing of the eyes or skin  · signs and symptoms of muscle injury like dark urine; trouble passing urine or change in the amount of urine; unusually weak or tired; muscle pain or  side or back pain  Side effects that usually do not require medical attention (report to your doctor or health care professional if they continue or are bothersome):  · diarrhea  · nausea  · stomach pain  · trouble sleeping  · upset stomach  This list may not describe all possible side effects. Call your doctor for medical advice about side effects. You may report side effects to FDA at 3-068-AJA-6820.  Where should I keep my medicine?  Keep out of the reach of children.  Store between 20 and 25 degrees C (68 and 77 degrees F). Throw away any unused medicine after the expiration date.  NOTE: This sheet is a summary. It may not cover all possible information. If you have questions about this medicine, talk to your doctor, pharmacist, or health care provider.  © 2020 Elsevier/Gold Standard (2019-10-09 11:36:16)

## 2021-12-28 NOTE — PROGRESS NOTES
Dr Edge spoke with Pt at bedside and wrote discharge orders. Pt and son educated on the importance of attending follow up appointments and taking medications as prescribed. Pt and son verbalized understanding of instructions with no further questions at this time. Pt escorted off the unit by CNA via wheelchair with belongings in hand.

## 2021-12-28 NOTE — PROGRESS NOTES
Report given to DEANNA Arenas. Patient resting comfortably in bed during time of report. VSS. Bed alarm on, bed locked and in lowest position, call light in reach, no needs at this time. Care relinquished.

## 2021-12-28 NOTE — DISCHARGE PLANNING
Anticipated Discharge Disposition: Home with HH     Action: Pt discussed during IDT rounds. Per Dr. Edge pt anticipated to d/c home with HH. HH order in place.     LSW called POA/granddaughter, Tamara. Pt has [facility] and [facility] contracted with pt's insurance. Tamara provided verbal consent for referral to be sent to [facility].     LSW faxed HH CHOICE to [facility].     LSW notified bedside RNDeepa that referral for HH has been sent. Per granddaughter, Tamara her father will be providing transportation upon d/c.     Barriers to Discharge: None     Plan: LSW to continue to follow and assist as needed

## 2021-12-28 NOTE — DISCHARGE PLANNING
We are currently verifying MD acceptance of your patient. This will be resolved ASAP. Voicemail has been left for office. Thank you for your patience.  Respectfully,   [facility]

## 2021-12-28 NOTE — FACE TO FACE
Face to Face Supporting Documentation - Home Health    The encounter with this patient was in whole or in part the primary reason for home health admission.    Date of encounter:   Patient:                    MRN:                       YOB: 2021  Julito Hinton  [id number]  9/12/1928     Home health to see patient for:  Skilled Nursing care for assessment, interventions & education, Physical Therapy evaluation and treatment and Occupational therapy evaluation and treatment    Skilled need for:  New Onset Medical Diagnosis Acute embolic stroke    Skilled nursing interventions to include:  Comment: .    Homebound status evidenced by:  Needs the assistance of another person in order to leave the home   . Leaving home requires a considerable and taxing effort. There is a normal inability to leave the home.    Community Physician to provide follow up care: Mike Guajardo M.D.     Optional Interventions? No      I certify the face to face encounter for this home health care referral meets the CMS requirements and the encounter/clinical assessment with the patient was, in whole, or in part, for the medical condition(s) listed above, which is the primary reason for home health care. Based on my clinical findings: the service(s) are medically necessary, support the need for home health care, and the homebound criteria are met.  I certify that this patient has had a face to face encounter by myself.  Mara Edge D.O. - NPI: [id number]

## 2021-12-28 NOTE — PROGRESS NOTES
"Patients granddaughter and DPOA Sherie is at bedside. Patient's daughter Nae arrived around 1730 and was very upset and began yelling at granddaughter. Patient's daughter Nae was asked by charge to wait in family room while RN asked patient if it was okay to share information with daughter Nae and if Nae could visit patient. Patient told RN and Charge RN that it was okay if daughter Nae gets updates and that she can visit from time to time. RN updated Nae and Nae returned to patient room. Upon entering the patient room, Nae began yelling at Sherie and patient. RN asked Nae if she could please calm down and Nae told RN \"no I will not, I am tired of never knowing what is going on with my mom\". Nae also requesting to see DPOA documents and was informed that DPOA would have to provide her with this information. Nae told Sherie \"lets take this outside\" while getting closer to her in the room. RN politely asked Nae to leave, Nae declined. RN called security and security escorted Nae off the premises. Charge RN notified of situation.  "

## 2021-12-28 NOTE — DISCHARGE PLANNING
[facility] Transitional Care Coordination    Referral from:  Dr Sanchez  Insurance Provider on Facesheet: [employer]  Potential Rehab Diagnosis: Stroke, debility    Chart review indicates patient may need on going medical management and may have therapy needs to possibly meet inpatient rehab facility criteria with the goal of returning to community.    D/C support: Lives with son and daughter, conflict at bedside and daughter was escorted out by security.     Physiatry consultation forwarded per protocol.     Last Covid test:  N/A    CVA/debility - patient currently maxA with history of dementia and active suicide ideations. Physiatry to consult. TCC will follow.     Thank you for the referral.

## 2021-12-28 NOTE — THERAPY
Physical Therapy   Initial Evaluation     Patient Name: Julito Hinton  Age:  89 y.o., Sex:  female  Medical Record #: [id number]  Today's Date: 12/27/2021     Precautions  Precautions: Fall Risk    Assessment  Patient is 89 y.o. female with a diagnosis of bilateral frontal subcortical ischemic infarcts. Pt is generally weak although L UE appears weaker than R UE. B LE equal strength. Pt also had a fall last week and is now c/o pain L bottom of foot, tenderness to palpation and also painful with standing, difficulty bearing weight because of this- MD updated. Pt also is confused but appears lives with dtr and son, supportive family at home. Recommend further acute PT with f/u therapy at SNF prior to DC Home.     Plan    Recommend Physical Therapy 4 times per week until therapy goals are met for the following treatments:  Bed Mobility, Gait Training, Neuro Re-Education / Balance, Stair Training, Therapeutic Activities and Therapeutic Exercises    DC Equipment Recommendations: Unable to determine at this time  Discharge Recommendations: Recommend post-acute placement for additional physical therapy services prior to discharge home        12/27/21 1145   Prior Living Situation   Housing / Facility 1 Story Apartment / Condo   Steps Into Home 0   Steps In Home 0   Equipment Owned 4-Wheel Walker   Lives with - Patient's Self Care Capacity Adult Children   Comments Pt lives with son and dtr   Prior Level of Functional Mobility   Bed Mobility Independent   Transfer Status Independent   Ambulation Independent   Assistive Devices Used 4-Wheel Walker   History of Falls   History of Falls Yes   Date of Last Fall   (per chart 1 week ago)   Cognition    Level of Consciousness Alert   Comments Pt knows she is in a hospital but confused, poor historian   Passive ROM Lower Body   Passive ROM Lower Body WDL   Active ROM Lower Body    Active ROM Lower Body  WDL   Strength Lower Body   Lower Body Strength  X   Comments grossly 4-/5  equal bilaterally   Sensation Lower Body   Lower Extremity Sensation   WDL   Strength Upper Body   Comments mildly decreased strength L UE vs R   Vision   Vision Comments vision intact   Balance Assessment   Sitting Balance (Static) Fair -   Sitting Balance (Dynamic) Poor +   Standing Balance (Static) Poor -   Standing Balance (Dynamic) Trace   Weight Shift Sitting Poor   Weight Shift Standing Poor   Comments maxA for standing balance   Gait Analysis   Gait Level Of Assist Unable to Participate   Comments attempted a few side steps up side of bed, pain L foot and weakness limiting ability to weight shift, maxA   Bed Mobility    Supine to Sit Maximal Assist   Sit to Supine Maximal Assist   Functional Mobility   Sit to Stand Maximal Assist  (x2)   Bed, Chair, Wheelchair Transfer Unable to Participate   Activity Tolerance   Sitting Edge of Bed 5 min   Standing 1 min   Short Term Goals    Short Term Goal # 1 Pt will be able to perform bed mobility and sup <> sit Mohan in 6 visits   Short Term Goal # 2 Pt will be able to perform sit <> stand and transfer Mohan in 6 visits so can DC home safely.   Short Term Goal # 3 Pt will be able to ambulate 100 ft with FWW Mohan in 6 visits so can DC home safely.

## 2021-12-28 NOTE — PROGRESS NOTES
Report and updates to DEANNA Ordonez.     Pt AOx4 but very fatigued and tired, slept majority of shift. On RA, tele (SR), and x2 assist.     Universal fall and safety precautions in place, call light within reach, pt has no apparent needs at this time.     Care transferred.

## 2021-12-28 NOTE — CONSULTS
[facility] PSYCHI[facility]IC CONSULT LIAISON NOTE:     DOS: 12/28/2021      HPI: Julito Hinton is a 89yo female with history of Dementia and HTN brought in by family for shortness of breath concerning for new heart failure, and signs and symptoms concerning for CVA. Expressed desire to die on admission, no plan nor means, no history of suicide attempts documented. Unable to tele in with staff to see patient after 2hr delay today. Will see tomorrow if still inpatient. Note based on chart review with recommendations as below.     Allergies: NKDA     Psychiatric Medications:  None     Past Medical History:   HTN  Anemia  CKD  Insomnia  Mood Disorder     Past Psychiatric History:   Mood Disorder  Insomnia     Social History: Unable to obtain.     Legal History: Unable to obtain.     Mental Status Exam: Unable to obtain.     Labs: CBC with anemia.     Rads: CT Head unremarkable.     Assessment and Plan:  Mood Disorder  Insomnia  -NRS prohibits medical hold (due to medical issues, dementia, or substance use) via L2k. While medically impaired, would not recommend L2k for this patient.  -Per primary team’s notes, she has capacity.  -No medication recommendations today.  -Psych CL will continue following patient while at [facility].  -Medication reconciliation was completed.  -Reviewed safety plan - 911, ER, PCM, MHC, Suicide Crisis Line, Nursing staff while inpatient.  -Legal Hold: no  -Visitors: yes  -Personal belongings: yes  -Observation level: N/A  -Instruction: N/A.  ///

## 2021-12-28 NOTE — PROGRESS NOTES
Telemetry Shift Summary     RHYTHM: SR  HR RANGE: 67-81  ECTOPY: r-f PVC, r PAC, r Coup  MEASUREMENTS: 0.14/0.08/0.40  Measurements for strip printed 12/27/21 at 15:38:55  Normal Measurements  Rhythm: SR  HR RANGE:   Measurements: 0.12-0.20/0.06-0.10/0.30-0.52      Tele strips reviewed and placed in chart

## 2021-12-28 NOTE — DISCHARGE PLANNING
Received Choice form at [phone]  Agency/Facility Name: [facility]  Referral sent per Choice form @ [phone]

## 2021-12-28 NOTE — PROGRESS NOTES
Report received from DEANNA Arenas. Patient sleeping at time of report. Plan of care discussed at bedside. VSS. Bed locked and in lowest position, bed alarm on, call light in reach. No needs at this time.

## 2021-12-28 NOTE — DISCHARGE SUMMARY
Discharge Summary    CHIEF COMPLAINT ON ADMISSION  Chief Complaint   Patient presents with   • Shortness of Breath   • Abdominal Pain       Reason for Admission  shortness of breath, abdominal dorcas*     Admission Date  12/25/2021    CODE STATUS  DNAR/DNI    HPI & HOSPITAL COURSE  89 y.o. female with hx HTN and dementia, brought in by granddaughter for worsening SOB and some edema. She had not been diagnosed with kidney or heart issues in the past, she lives at home with her family and they take care of her. Only SOB, slight edema in bilateral lower extremities.  She was admitted and evaluated overnight for shortness of breath and edema.  On evaluation next morning, 12/26, patient's family did report that she had had a fall little over a week ago, and one of the initial reason they brought her to the hospital was because she seemed weaker on left side, they had also noticed some facial droop on her left side and they were concerned for stroke.  She did have CT scans on admission, no acute findings but was eventually admitted for volume overload and possible CHF work-up.  MRI confirmed strokes in the bilateral hilar area and right frontal lobe.  PT/OT worked with the patient and recommended SNF but family is able to provide 24 hour care and would like the assistance from home health for therapies.        Therefore, she is discharged in good and stable condition to home with organized home healthcare and close outpatient follow-up.    The patient met 2-midnight criteria for an inpatient stay at the time of discharge.    Discharge Date  12/28/2021    FOLLOW UP ITEMS POST DISCHARGE  PCP and Stroke bridge clinic    DISCHARGE DIAGNOSES  Principal Problem:    SOB (shortness of breath) POA: Unknown  Active Problems:    Essential hypertension POA: Yes    Normocytic anemia POA: Yes    Stage 3a chronic kidney disease (HCC) POA: Yes    Body mass index (BMI) less than 19 in adult POA: Yes    Vitamin D deficiency POA: Yes    JOHANA  (acute kidney injury) (HCC) POA: Yes    Weakness POA: Yes    Cerebrovascular accident (CVA) due to embolism (HCC) POA: Yes    Suicidal ideations POA: Unknown  Resolved Problems:    * No resolved hospital problems. *      FOLLOW UP  No future appointments.  Mike Guajardo M.D.  [address]  [address]  [place] [address]  [phone]    In 2 weeks      [facility]    In 2 weeks  stroke bridge follow up clinic      MEDICATIONS ON DISCHARGE     Medication List      START taking these medications      Instructions   aspirin 81 MG Chew chewable tablet  Start taking on: December 29, 2021  Commonly known as: ASA   Chew 1 Tablet every day.  Dose: 81 mg     atorvastatin 40 MG Tabs  Commonly known as: LIPITOR   Take 1 Tablet by mouth every evening.  Dose: 40 mg        CONTINUE taking these medications      Instructions   acetaminophen 650 MG CR tablet  Commonly known as: TYLENOL   Take 650 mg by mouth every morning. Indications: Pain  Dose: 650 mg     irbesartan-hydrochlorothiazide 300-12.5 MG per tablet  Commonly known as: AVALIDE   Take 1 Tablet by mouth every day.  Dose: 1 Tablet     oxybutynin 5 MG Tabs  Commonly known as: DITROPAN   Take 5 mg by mouth every evening.  Dose: 5 mg     traZODone 50 MG Tabs  Commonly known as: DESYREL   Take 50 mg by mouth every evening.  Dose: 50 mg     Voltaren 1 % Gel  Generic drug: diclofenac sodium   Apply 1 g topically 1 time a day as needed (leg pain).  Dose: 1 g            Allergies  No Known Allergies    DIET  Orders Placed This Encounter   Procedures   • Diet Order Diet: Cardiac     Standing Status:   Standing     Number of Occurrences:   1     Order Specific Question:   Diet:     Answer:   Cardiac [6]       ACTIVITY  As tolerated.  Weight bearing as tolerated    CONSULTATIONS  none    PROCEDURES  none    LABORATORY  Lab Results   Component Value Date    SODIUM 138 12/28/2021    POTASSIUM 4.4 12/28/2021    CHLORIDE 104 12/28/2021    CO2 19 (L) 12/28/2021     GLUCOSE 89 12/28/2021    BUN 57 (H) 12/28/2021    CREATININE 3.23 (H) 12/28/2021    CREATININE 1.2 02/10/2009        Lab Results   Component Value Date    WBC 8.3 12/28/2021    HEMOGLOBIN 9.4 (L) 12/28/2021    HEMATOCRIT 30.2 (L) 12/28/2021    PLATELETCT 375 12/28/2021        Total time of the discharge process exceeds 34 minutes.

## 2021-12-28 NOTE — DISCHARGE PLANNING
ATTN: Case Management  RE: Referral for Home Health    As of 12/28/21, we have accepted the Home Health referral for the patient listed above.    A [facility] clinician will be out to see the patient within 48 hours. If you have any questions or concerns regarding the patient’s transition to Home Health, please do not hesitate to contact us at [phone].      We look forward to collaborating with you,  [facility] Home Health Team

## 2021-12-29 NOTE — DOCUMENTATION QUERY
[facility]                                                                       Query Response Note      PATIENT:               SHERRIE MCKENZIE  ACCT #:                  [id number]  MRN:                     [id number]  :                      [id number]  ADMIT DATE:       2021 4:52 PM  DISCH DATE:        2021 3:30 PM  RESPONDING  PROVIDER #:        [id number]           QUERY TEXT:    BMI <19 with muscle wasting is documented in the Medical Record.  Can a diagnosis be provided to support this finding?    NOTE:  If an appropriate response is not listed below, please respond with a new note.    The patient's Clinical Indicators include:  - Findings:  BMI less than 19 in adult with muscle wasting per progress notes and DC summary     - Treatments:  daily weight, cardiac diet, labs    - Risk factors:  advanced age, vitamin D deficiency, anemia, CVA    Thank You,  Petrona Lezama RN  Clinical Documentation   [email or web address]  Connect via Voalte Messenger  Options provided:   -- Underweight   -- Cachexia   -- Findings of no clinical significance   -- Unable to determine      Query created by: Petrona Lezama on 2021 2:51 PM    RESPONSE TEXT:    Cachexia          Electronically signed by:  ELIDA TRINIDAD DO 2021 2:02 PM

## 2021-12-29 NOTE — PROGRESS NOTES
Telemetry Shift Summary     RHYTHM: SR hx: 4 bts VTACH, pac, PSVT up to 175  HR RANGE: 60-80   ECTOPY: r PVC  MEASUREMENTS: 0.16/0.08/0.40  Measurements for strip printed 12/28/21 at 13:33:04  Normal Measurements  Rhythm: SR  HR RANGE:   Measurements: 0.12-0.20/0.06-0.10/0.30-0.52      Tele strips reviewed and placed in chart

## 2021-12-31 NOTE — Clinical Note
Please send to Dr. Mike Guajardo.    Primary dx/Skilled need: Frontal subcortical ischemic infarcts, Htn, CKD, Dementia  PT: 1w1, 2w4  Zip code: [address]  Disciplines ordered: Skilled nursing, physical and occupational therapy; adding home health aide, speech therapy and dietician  Insurance & authorization: [employer]  Certification period: 12/31/2021 - 2/28/2022  Special considerations: none

## 2022-01-01 ENCOUNTER — HOME CARE VISIT (OUTPATIENT)
Dept: HOME HEALTH SERVICES | Facility: HOME HEALTHCARE | Age: 87
End: 2022-01-01
Payer: MEDICARE

## 2022-01-01 ENCOUNTER — HOME CARE VISIT (OUTPATIENT)
Dept: HOSPICE | Facility: HOSPICE | Age: 87
End: 2022-01-01
Payer: MEDICARE

## 2022-01-01 ENCOUNTER — HOSPICE ADMISSION (OUTPATIENT)
Dept: HOSPICE | Facility: HOSPICE | Age: 87
End: 2022-01-01
Payer: MEDICARE

## 2022-01-01 ENCOUNTER — PHARMACY VISIT (OUTPATIENT)
Dept: PHARMACY | Facility: MEDICAL CENTER | Age: 87
End: 2022-01-01
Payer: COMMERCIAL

## 2022-01-01 ENCOUNTER — TELEPHONE (OUTPATIENT)
Dept: CARDIOLOGY | Facility: MEDICAL CENTER | Age: 87
End: 2022-01-01

## 2022-01-01 ENCOUNTER — APPOINTMENT (OUTPATIENT)
Dept: CARDIOLOGY | Facility: MEDICAL CENTER | Age: 87
DRG: 309 | End: 2022-01-01
Attending: STUDENT IN AN ORGANIZED HEALTH CARE EDUCATION/TRAINING PROGRAM
Payer: MEDICARE

## 2022-01-01 ENCOUNTER — HOSPITAL ENCOUNTER (INPATIENT)
Facility: MEDICAL CENTER | Age: 87
LOS: 4 days | DRG: 309 | End: 2022-01-10
Attending: EMERGENCY MEDICINE | Admitting: INTERNAL MEDICINE
Payer: MEDICARE

## 2022-01-01 ENCOUNTER — APPOINTMENT (OUTPATIENT)
Dept: RADIOLOGY | Facility: MEDICAL CENTER | Age: 87
DRG: 309 | End: 2022-01-01
Attending: STUDENT IN AN ORGANIZED HEALTH CARE EDUCATION/TRAINING PROGRAM
Payer: MEDICARE

## 2022-01-01 ENCOUNTER — DOCUMENTATION (OUTPATIENT)
Dept: MEDICAL GROUP | Facility: PHYSICIAN GROUP | Age: 87
End: 2022-01-01

## 2022-01-01 ENCOUNTER — APPOINTMENT (OUTPATIENT)
Dept: RADIOLOGY | Facility: MEDICAL CENTER | Age: 87
DRG: 309 | End: 2022-01-01
Attending: EMERGENCY MEDICINE
Payer: MEDICARE

## 2022-01-01 VITALS
SYSTOLIC BLOOD PRESSURE: 115 MMHG | RESPIRATION RATE: 16 BRPM | TEMPERATURE: 97.6 F | OXYGEN SATURATION: 95 % | HEART RATE: 63 BPM | DIASTOLIC BLOOD PRESSURE: 68 MMHG

## 2022-01-01 VITALS — DIASTOLIC BLOOD PRESSURE: 77 MMHG | HEART RATE: 87 BPM | SYSTOLIC BLOOD PRESSURE: 112 MMHG | RESPIRATION RATE: 18 BRPM

## 2022-01-01 VITALS — HEART RATE: 72 BPM | SYSTOLIC BLOOD PRESSURE: 130 MMHG | RESPIRATION RATE: 18 BRPM | DIASTOLIC BLOOD PRESSURE: 86 MMHG

## 2022-01-01 VITALS — HEART RATE: 100 BPM | RESPIRATION RATE: 20 BRPM | SYSTOLIC BLOOD PRESSURE: 122 MMHG | DIASTOLIC BLOOD PRESSURE: 70 MMHG

## 2022-01-01 VITALS — SYSTOLIC BLOOD PRESSURE: 142 MMHG | DIASTOLIC BLOOD PRESSURE: 98 MMHG | RESPIRATION RATE: 26 BRPM | HEART RATE: 92 BPM

## 2022-01-01 VITALS
TEMPERATURE: 97.6 F | SYSTOLIC BLOOD PRESSURE: 90 MMHG | OXYGEN SATURATION: 97 % | DIASTOLIC BLOOD PRESSURE: 60 MMHG | HEART RATE: 64 BPM | RESPIRATION RATE: 16 BRPM

## 2022-01-01 VITALS
OXYGEN SATURATION: 97 % | HEART RATE: 69 BPM | TEMPERATURE: 98.5 F | DIASTOLIC BLOOD PRESSURE: 72 MMHG | SYSTOLIC BLOOD PRESSURE: 112 MMHG | RESPIRATION RATE: 16 BRPM

## 2022-01-01 VITALS — HEART RATE: 61 BPM | RESPIRATION RATE: 18 BRPM | SYSTOLIC BLOOD PRESSURE: 105 MMHG | DIASTOLIC BLOOD PRESSURE: 72 MMHG

## 2022-01-01 VITALS
DIASTOLIC BLOOD PRESSURE: 68 MMHG | SYSTOLIC BLOOD PRESSURE: 115 MMHG | OXYGEN SATURATION: 95 % | HEART RATE: 63 BPM | RESPIRATION RATE: 16 BRPM | TEMPERATURE: 97.6 F

## 2022-01-01 VITALS
DIASTOLIC BLOOD PRESSURE: 65 MMHG | RESPIRATION RATE: 16 BRPM | TEMPERATURE: 97.5 F | OXYGEN SATURATION: 95 % | SYSTOLIC BLOOD PRESSURE: 105 MMHG | HEART RATE: 68 BPM

## 2022-01-01 VITALS — DIASTOLIC BLOOD PRESSURE: 72 MMHG | RESPIRATION RATE: 16 BRPM | HEART RATE: 92 BPM | SYSTOLIC BLOOD PRESSURE: 98 MMHG

## 2022-01-01 VITALS
OXYGEN SATURATION: 95 % | DIASTOLIC BLOOD PRESSURE: 65 MMHG | SYSTOLIC BLOOD PRESSURE: 105 MMHG | RESPIRATION RATE: 16 BRPM | HEART RATE: 68 BPM | TEMPERATURE: 97.5 F

## 2022-01-01 VITALS — SYSTOLIC BLOOD PRESSURE: 150 MMHG | DIASTOLIC BLOOD PRESSURE: 96 MMHG | RESPIRATION RATE: 18 BRPM | HEART RATE: 85 BPM

## 2022-01-01 VITALS — DIASTOLIC BLOOD PRESSURE: 74 MMHG | RESPIRATION RATE: 20 BRPM | SYSTOLIC BLOOD PRESSURE: 114 MMHG | HEART RATE: 80 BPM

## 2022-01-01 VITALS
SYSTOLIC BLOOD PRESSURE: 110 MMHG | OXYGEN SATURATION: 95 % | DIASTOLIC BLOOD PRESSURE: 60 MMHG | SYSTOLIC BLOOD PRESSURE: 110 MMHG | HEART RATE: 64 BPM | RESPIRATION RATE: 16 BRPM | DIASTOLIC BLOOD PRESSURE: 60 MMHG | TEMPERATURE: 97.6 F

## 2022-01-01 VITALS — SYSTOLIC BLOOD PRESSURE: 138 MMHG | RESPIRATION RATE: 20 BRPM | DIASTOLIC BLOOD PRESSURE: 88 MMHG | HEART RATE: 80 BPM

## 2022-01-01 VITALS
SYSTOLIC BLOOD PRESSURE: 118 MMHG | RESPIRATION RATE: 18 BRPM | TEMPERATURE: 98.5 F | HEART RATE: 73 BPM | DIASTOLIC BLOOD PRESSURE: 66 MMHG | WEIGHT: 89 LBS | BODY MASS INDEX: 17.98 KG/M2 | OXYGEN SATURATION: 97 %

## 2022-01-01 VITALS — SYSTOLIC BLOOD PRESSURE: 119 MMHG | DIASTOLIC BLOOD PRESSURE: 70 MMHG | HEART RATE: 73 BPM | RESPIRATION RATE: 16 BRPM

## 2022-01-01 VITALS — HEART RATE: 104 BPM

## 2022-01-01 VITALS — RESPIRATION RATE: 7 BRPM

## 2022-01-01 DIAGNOSIS — E87.29 HIGH ANION GAP METABOLIC ACIDOSIS: ICD-10-CM

## 2022-01-01 DIAGNOSIS — N18.31 STAGE 3A CHRONIC KIDNEY DISEASE: ICD-10-CM

## 2022-01-01 DIAGNOSIS — Z51.5 HOSPICE CARE PATIENT: ICD-10-CM

## 2022-01-01 DIAGNOSIS — E55.9 VITAMIN D DEFICIENCY: ICD-10-CM

## 2022-01-01 DIAGNOSIS — I63.9 CEREBROVASCULAR ACCIDENT (CVA), UNSPECIFIED MECHANISM (HCC): ICD-10-CM

## 2022-01-01 DIAGNOSIS — I48.91 ATRIAL FIBRILLATION WITH RVR (HCC): ICD-10-CM

## 2022-01-01 DIAGNOSIS — I63.40 CEREBROVASCULAR ACCIDENT (CVA) DUE TO EMBOLISM OF CEREBRAL ARTERY (HCC): ICD-10-CM

## 2022-01-01 DIAGNOSIS — R74.8 ELEVATED ALKALINE PHOSPHATASE LEVEL: ICD-10-CM

## 2022-01-01 LAB
ALBUMIN SERPL BCP-MCNC: 3.7 G/DL (ref 3.2–4.9)
ALBUMIN/GLOB SERPL: 0.9 G/DL
ALP SERPL-CCNC: 93 U/L (ref 30–99)
ALT SERPL-CCNC: 6 U/L (ref 2–50)
ANION GAP SERPL CALC-SCNC: 15 MMOL/L (ref 7–16)
ANION GAP SERPL CALC-SCNC: 24 MMOL/L (ref 7–16)
APPEARANCE UR: CLEAR
AST SERPL-CCNC: 21 U/L (ref 12–45)
BACTERIA #/AREA URNS HPF: ABNORMAL /HPF
BASOPHILS # BLD AUTO: 0.4 % (ref 0–1.8)
BASOPHILS # BLD: 0.04 K/UL (ref 0–0.12)
BILIRUB SERPL-MCNC: 0.3 MG/DL (ref 0.1–1.5)
BILIRUB UR QL STRIP.AUTO: NEGATIVE
BUN SERPL-MCNC: 71 MG/DL (ref 8–22)
BUN SERPL-MCNC: 80 MG/DL (ref 8–22)
CALCIUM SERPL-MCNC: 7.7 MG/DL (ref 8.4–10.2)
CALCIUM SERPL-MCNC: 9 MG/DL (ref 8.4–10.2)
CHLORIDE SERPL-SCNC: 103 MMOL/L (ref 96–112)
CHLORIDE SERPL-SCNC: 99 MMOL/L (ref 96–112)
CO2 SERPL-SCNC: 14 MMOL/L (ref 20–33)
CO2 SERPL-SCNC: 14 MMOL/L (ref 20–33)
COLOR UR: YELLOW
CREAT SERPL-MCNC: 3.07 MG/DL (ref 0.5–1.4)
CREAT SERPL-MCNC: 3.68 MG/DL (ref 0.5–1.4)
EKG IMPRESSION: NORMAL
EOSINOPHIL # BLD AUTO: 0.03 K/UL (ref 0–0.51)
EOSINOPHIL NFR BLD: 0.3 % (ref 0–6.9)
EPI CELLS #/AREA URNS HPF: ABNORMAL /HPF
ERYTHROCYTE [DISTWIDTH] IN BLOOD BY AUTOMATED COUNT: 55.9 FL (ref 35.9–50)
ERYTHROCYTE [DISTWIDTH] IN BLOOD BY AUTOMATED COUNT: 62.3 FL (ref 35.9–50)
FERRITIN SERPL-MCNC: 453 NG/ML (ref 10–291)
FLUAV RNA SPEC QL NAA+PROBE: NEGATIVE
FLUBV RNA SPEC QL NAA+PROBE: NEGATIVE
GLOBULIN SER CALC-MCNC: 4.3 G/DL (ref 1.9–3.5)
GLUCOSE SERPL-MCNC: 85 MG/DL (ref 65–99)
GLUCOSE SERPL-MCNC: 96 MG/DL (ref 65–99)
GLUCOSE UR STRIP.AUTO-MCNC: NEGATIVE MG/DL
HCT VFR BLD AUTO: 27.6 % (ref 37–47)
HCT VFR BLD AUTO: 35.2 % (ref 37–47)
HGB BLD-MCNC: 10.7 G/DL (ref 12–16)
HGB BLD-MCNC: 7.9 G/DL (ref 12–16)
HGB RETIC QN AUTO: 34.5 PG/CELL (ref 29–35)
IMM GRANULOCYTES # BLD AUTO: 0.04 K/UL (ref 0–0.11)
IMM GRANULOCYTES NFR BLD AUTO: 0.4 % (ref 0–0.9)
IMM RETICS NFR: 8.5 % (ref 9.3–17.4)
IRON SATN MFR SERPL: 21 % (ref 15–55)
IRON SERPL-MCNC: 29 UG/DL (ref 40–170)
KETONES UR STRIP.AUTO-MCNC: NEGATIVE MG/DL
LACTATE BLD-SCNC: 1 MMOL/L (ref 0.5–2)
LACTATE BLD-SCNC: 1.3 MMOL/L (ref 0.5–2)
LEUKOCYTE ESTERASE UR QL STRIP.AUTO: ABNORMAL
LIPASE SERPL-CCNC: 55 U/L (ref 7–58)
LV EJECT FRACT  99904: 60
LYMPHOCYTES # BLD AUTO: 2.11 K/UL (ref 1–4.8)
LYMPHOCYTES NFR BLD: 19.3 % (ref 22–41)
MCH RBC QN AUTO: 27.6 PG (ref 27–33)
MCH RBC QN AUTO: 28.3 PG (ref 27–33)
MCHC RBC AUTO-ENTMCNC: 28.6 G/DL (ref 33.6–35)
MCHC RBC AUTO-ENTMCNC: 30.4 G/DL (ref 33.6–35)
MCV RBC AUTO: 91 FL (ref 81.4–97.8)
MCV RBC AUTO: 98.9 FL (ref 81.4–97.8)
MICRO URNS: ABNORMAL
MONOCYTES # BLD AUTO: 0.39 K/UL (ref 0–0.85)
MONOCYTES NFR BLD AUTO: 3.6 % (ref 0–13.4)
NEUTROPHILS # BLD AUTO: 8.33 K/UL (ref 2–7.15)
NEUTROPHILS NFR BLD: 76 % (ref 44–72)
NITRITE UR QL STRIP.AUTO: NEGATIVE
NRBC # BLD AUTO: 0 K/UL
NRBC BLD-RTO: 0 /100 WBC
NT-PROBNP SERPL IA-MCNC: 2013 PG/ML (ref 0–125)
PH UR STRIP.AUTO: 5.5 [PH] (ref 5–8)
PLATELET # BLD AUTO: 232 K/UL (ref 164–446)
PLATELET # BLD AUTO: 423 K/UL (ref 164–446)
PMV BLD AUTO: 10.2 FL (ref 9–12.9)
PMV BLD AUTO: 10.3 FL (ref 9–12.9)
POTASSIUM SERPL-SCNC: 4.7 MMOL/L (ref 3.6–5.5)
POTASSIUM SERPL-SCNC: 5 MMOL/L (ref 3.6–5.5)
PROCALCITONIN SERPL-MCNC: 0.36 NG/ML
PROT SERPL-MCNC: 8 G/DL (ref 6–8.2)
PROT UR QL STRIP: 30 MG/DL
RBC # BLD AUTO: 2.79 M/UL (ref 4.2–5.4)
RBC # BLD AUTO: 3.87 M/UL (ref 4.2–5.4)
RBC # URNS HPF: ABNORMAL /HPF
RBC UR QL AUTO: ABNORMAL
RETICS # AUTO: 0.06 M/UL (ref 0.04–0.06)
RETICS/RBC NFR: 1.8 % (ref 0.8–2.1)
RSV RNA SPEC QL NAA+PROBE: NEGATIVE
SARS-COV-2 RNA RESP QL NAA+PROBE: NOTDETECTED
SODIUM SERPL-SCNC: 132 MMOL/L (ref 135–145)
SODIUM SERPL-SCNC: 137 MMOL/L (ref 135–145)
SP GR UR STRIP.AUTO: 1.02
SPECIMEN SOURCE: NORMAL
T4 FREE SERPL-MCNC: 1.2 NG/DL (ref 0.93–1.7)
TIBC SERPL-MCNC: 137 UG/DL (ref 250–450)
TROPONIN T SERPL-MCNC: 105 NG/L (ref 6–19)
TROPONIN T SERPL-MCNC: 121 NG/L (ref 6–19)
TROPONIN T SERPL-MCNC: 134 NG/L (ref 6–19)
TSH SERPL DL<=0.005 MIU/L-ACNC: 5.25 UIU/ML (ref 0.38–5.33)
UIBC SERPL-MCNC: 108 UG/DL (ref 110–370)
WBC # BLD AUTO: 10.9 K/UL (ref 4.8–10.8)
WBC # BLD AUTO: 6.2 K/UL (ref 4.8–10.8)
WBC #/AREA URNS HPF: ABNORMAL /HPF

## 2022-01-01 PROCEDURE — S9126 HOSPICE CARE, IN THE HOME, P: HCPCS

## 2022-01-01 PROCEDURE — G0299 HHS/HOSPICE OF RN EA 15 MIN: HCPCS

## 2022-01-01 PROCEDURE — G0493 RN CARE EA 15 MIN HH/HOSPICE: HCPCS

## 2022-01-01 PROCEDURE — 85046 RETICYTE/HGB CONCENTRATE: CPT

## 2022-01-01 PROCEDURE — 84484 ASSAY OF TROPONIN QUANT: CPT

## 2022-01-01 PROCEDURE — G0156 HHCP-SVS OF AIDE,EA 15 MIN: HCPCS

## 2022-01-01 PROCEDURE — 770020 HCHG ROOM/CARE - TELE (206)

## 2022-01-01 PROCEDURE — 83880 ASSAY OF NATRIURETIC PEPTIDE: CPT

## 2022-01-01 PROCEDURE — 99233 SBSQ HOSP IP/OBS HIGH 50: CPT | Performed by: STUDENT IN AN ORGANIZED HEALTH CARE EDUCATION/TRAINING PROGRAM

## 2022-01-01 PROCEDURE — 80048 BASIC METABOLIC PNL TOTAL CA: CPT

## 2022-01-01 PROCEDURE — 85027 COMPLETE CBC AUTOMATED: CPT

## 2022-01-01 PROCEDURE — 700102 HCHG RX REV CODE 250 W/ 637 OVERRIDE(OP): Performed by: INTERNAL MEDICINE

## 2022-01-01 PROCEDURE — 0241U HCHG SARS-COV-2 COVID-19 NFCT DS RESP RNA 4 TRGT MIC: CPT

## 2022-01-01 PROCEDURE — 99223 1ST HOSP IP/OBS HIGH 75: CPT | Mod: AI | Performed by: INTERNAL MEDICINE

## 2022-01-01 PROCEDURE — 665036 HSPC NOTICE OF ELECTION NOE

## 2022-01-01 PROCEDURE — 93248 EXT ECG>7D<15D REV&INTERPJ: CPT | Mod: GW | Performed by: INTERNAL MEDICINE

## 2022-01-01 PROCEDURE — G0155 HHCP-SVS OF CSW,EA 15 MIN: HCPCS

## 2022-01-01 PROCEDURE — 99231 SBSQ HOSP IP/OBS SF/LOW 25: CPT | Performed by: STUDENT IN AN ORGANIZED HEALTH CARE EDUCATION/TRAINING PROGRAM

## 2022-01-01 PROCEDURE — 83550 IRON BINDING TEST: CPT

## 2022-01-01 PROCEDURE — 93010 ELECTROCARDIOGRAM REPORT: CPT | Performed by: INTERNAL MEDICINE

## 2022-01-01 PROCEDURE — 700111 HCHG RX REV CODE 636 W/ 250 OVERRIDE (IP): Performed by: INTERNAL MEDICINE

## 2022-01-01 PROCEDURE — 85025 COMPLETE CBC W/AUTO DIFF WBC: CPT

## 2022-01-01 PROCEDURE — 99285 EMERGENCY DEPT VISIT HI MDM: CPT

## 2022-01-01 PROCEDURE — C9803 HOPD COVID-19 SPEC COLLECT: HCPCS | Performed by: STUDENT IN AN ORGANIZED HEALTH CARE EDUCATION/TRAINING PROGRAM

## 2022-01-01 PROCEDURE — 94760 N-INVAS EAR/PLS OXIMETRY 1: CPT

## 2022-01-01 PROCEDURE — A9270 NON-COVERED ITEM OR SERVICE: HCPCS | Performed by: INTERNAL MEDICINE

## 2022-01-01 PROCEDURE — 82728 ASSAY OF FERRITIN: CPT

## 2022-01-01 PROCEDURE — 700105 HCHG RX REV CODE 258: Performed by: INTERNAL MEDICINE

## 2022-01-01 PROCEDURE — 71045 X-RAY EXAM CHEST 1 VIEW: CPT

## 2022-01-01 PROCEDURE — 99239 HOSP IP/OBS DSCHRG MGMT >30: CPT | Performed by: STUDENT IN AN ORGANIZED HEALTH CARE EDUCATION/TRAINING PROGRAM

## 2022-01-01 PROCEDURE — 83605 ASSAY OF LACTIC ACID: CPT

## 2022-01-01 PROCEDURE — 81001 URINALYSIS AUTO W/SCOPE: CPT

## 2022-01-01 PROCEDURE — 93308 TTE F-UP OR LMTD: CPT

## 2022-01-01 PROCEDURE — RXMED WILLOW AMBULATORY MEDICATION CHARGE: Performed by: INTERNAL MEDICINE

## 2022-01-01 PROCEDURE — 93325 DOPPLER ECHO COLOR FLOW MAPG: CPT | Mod: 26 | Performed by: INTERNAL MEDICINE

## 2022-01-01 PROCEDURE — G0151 HHCP-SERV OF PT,EA 15 MIN: HCPCS

## 2022-01-01 PROCEDURE — 770001 HCHG ROOM/CARE - MED/SURG/GYN PRIV*

## 2022-01-01 PROCEDURE — G0153 HHCP-SVS OF S/L PATH,EA 15MN: HCPCS

## 2022-01-01 PROCEDURE — 84145 PROCALCITONIN (PCT): CPT

## 2022-01-01 PROCEDURE — 80053 COMPREHEN METABOLIC PANEL: CPT

## 2022-01-01 PROCEDURE — 93005 ELECTROCARDIOGRAM TRACING: CPT | Performed by: EMERGENCY MEDICINE

## 2022-01-01 PROCEDURE — 84443 ASSAY THYROID STIM HORMONE: CPT

## 2022-01-01 PROCEDURE — 84439 ASSAY OF FREE THYROXINE: CPT

## 2022-01-01 PROCEDURE — 700105 HCHG RX REV CODE 258: Performed by: EMERGENCY MEDICINE

## 2022-01-01 PROCEDURE — 93321 DOPPLER ECHO F-UP/LMTD STD: CPT | Mod: 26 | Performed by: INTERNAL MEDICINE

## 2022-01-01 PROCEDURE — 93308 TTE F-UP OR LMTD: CPT | Mod: 26 | Performed by: INTERNAL MEDICINE

## 2022-01-01 PROCEDURE — 83540 ASSAY OF IRON: CPT

## 2022-01-01 PROCEDURE — 83690 ASSAY OF LIPASE: CPT

## 2022-01-01 RX ORDER — HALOPERIDOL 2 MG/ML
0.5 SOLUTION ORAL EVERY 6 HOURS PRN
Qty: 30 ML | Refills: 6 | Status: SHIPPED | OUTPATIENT
Start: 2022-01-01 | End: 2023-04-13

## 2022-01-01 RX ORDER — LORAZEPAM 2 MG/ML
0.5 CONCENTRATE ORAL EVERY 6 HOURS PRN
Qty: 30 ML | Refills: 0 | Status: SHIPPED | OUTPATIENT
Start: 2022-01-01 | End: 2022-01-01

## 2022-01-01 RX ORDER — AMOXICILLIN 250 MG
2 CAPSULE ORAL 2 TIMES DAILY
Status: DISCONTINUED | OUTPATIENT
Start: 2022-01-01 | End: 2022-01-01 | Stop reason: HOSPADM

## 2022-01-01 RX ORDER — MORPHINE SULFATE 100 MG/5ML
20 SOLUTION ORAL EVERY 4 HOURS PRN
Qty: 120 ML | Refills: 0
Start: 2022-01-01 | End: 2022-01-01 | Stop reason: SDUPTHER

## 2022-01-01 RX ORDER — SODIUM CHLORIDE 9 MG/ML
1000 INJECTION, SOLUTION INTRAVENOUS ONCE
Status: COMPLETED | OUTPATIENT
Start: 2022-01-01 | End: 2022-01-01

## 2022-01-01 RX ORDER — MORPHINE SULFATE 100 MG/5ML
5-10 SOLUTION ORAL
Qty: 30 ML | Refills: 0 | Status: SHIPPED | OUTPATIENT
Start: 2022-01-01 | End: 2022-01-01 | Stop reason: SDUPTHER

## 2022-01-01 RX ORDER — BISACODYL 10 MG
10 SUPPOSITORY, RECTAL RECTAL
Status: DISCONTINUED | OUTPATIENT
Start: 2022-01-01 | End: 2022-01-01 | Stop reason: HOSPADM

## 2022-01-01 RX ORDER — ASPIRIN 81 MG/1
TABLET ORAL
Qty: 15 TABLET | Refills: 3 | Status: SHIPPED | OUTPATIENT
Start: 2022-01-01 | End: 2022-01-01

## 2022-01-01 RX ORDER — HEPARIN SODIUM 5000 [USP'U]/ML
5000 INJECTION, SOLUTION INTRAVENOUS; SUBCUTANEOUS EVERY 8 HOURS
Status: DISCONTINUED | OUTPATIENT
Start: 2022-01-01 | End: 2022-01-01

## 2022-01-01 RX ORDER — MORPHINE SULFATE 100 MG/5ML
20 SOLUTION ORAL EVERY 4 HOURS PRN
Qty: 120 ML | Refills: 0 | Status: SHIPPED | OUTPATIENT
Start: 2022-01-01 | End: 2023-04-13

## 2022-01-01 RX ORDER — OXYBUTYNIN CHLORIDE 5 MG/1
5 TABLET ORAL EVERY EVENING
Status: DISCONTINUED | OUTPATIENT
Start: 2022-01-01 | End: 2022-01-01 | Stop reason: HOSPADM

## 2022-01-01 RX ORDER — SODIUM CHLORIDE 9 MG/ML
INJECTION, SOLUTION INTRAVENOUS CONTINUOUS
Status: DISCONTINUED | OUTPATIENT
Start: 2022-01-01 | End: 2022-01-01

## 2022-01-01 RX ORDER — POLYETHYLENE GLYCOL 3350 17 G/17G
1 POWDER, FOR SOLUTION ORAL
Status: DISCONTINUED | OUTPATIENT
Start: 2022-01-01 | End: 2022-01-01 | Stop reason: HOSPADM

## 2022-01-01 RX ORDER — ACETAMINOPHEN 325 MG/1
650 TABLET ORAL EVERY 6 HOURS PRN
Status: DISCONTINUED | OUTPATIENT
Start: 2022-01-01 | End: 2022-01-01 | Stop reason: HOSPADM

## 2022-01-01 RX ORDER — CHOLECALCIFEROL (VITAMIN D3) 125 MCG
5 CAPSULE ORAL NIGHTLY PRN
Status: DISCONTINUED | OUTPATIENT
Start: 2022-01-01 | End: 2022-01-01 | Stop reason: HOSPADM

## 2022-01-01 RX ORDER — ONDANSETRON 2 MG/ML
4 INJECTION INTRAMUSCULAR; INTRAVENOUS EVERY 4 HOURS PRN
Status: DISCONTINUED | OUTPATIENT
Start: 2022-01-01 | End: 2022-01-01 | Stop reason: HOSPADM

## 2022-01-01 RX ORDER — SODIUM CHLORIDE, SODIUM LACTATE, POTASSIUM CHLORIDE, CALCIUM CHLORIDE 600; 310; 30; 20 MG/100ML; MG/100ML; MG/100ML; MG/100ML
1000 INJECTION, SOLUTION INTRAVENOUS ONCE
Status: COMPLETED | OUTPATIENT
Start: 2022-01-01 | End: 2022-01-01

## 2022-01-01 RX ORDER — ATORVASTATIN CALCIUM 40 MG/1
40 TABLET, FILM COATED ORAL EVERY EVENING
Status: DISCONTINUED | OUTPATIENT
Start: 2022-01-01 | End: 2022-01-01

## 2022-01-01 RX ORDER — DILTIAZEM HYDROCHLORIDE 5 MG/ML
10 INJECTION INTRAVENOUS EVERY 4 HOURS PRN
Status: DISCONTINUED | OUTPATIENT
Start: 2022-01-01 | End: 2022-01-01

## 2022-01-01 RX ORDER — ONDANSETRON 4 MG/1
4 TABLET, ORALLY DISINTEGRATING ORAL EVERY 4 HOURS PRN
Status: DISCONTINUED | OUTPATIENT
Start: 2022-01-01 | End: 2022-01-01 | Stop reason: HOSPADM

## 2022-01-01 RX ORDER — ASPIRIN 81 MG/1
81 TABLET, CHEWABLE ORAL DAILY
Status: DISCONTINUED | OUTPATIENT
Start: 2022-01-01 | End: 2022-01-01 | Stop reason: HOSPADM

## 2022-01-01 RX ORDER — DILTIAZEM HYDROCHLORIDE 5 MG/ML
10 INJECTION INTRAVENOUS ONCE
Status: DISCONTINUED | OUTPATIENT
Start: 2022-01-01 | End: 2022-01-01

## 2022-01-01 RX ORDER — TRAZODONE HYDROCHLORIDE 50 MG/1
TABLET ORAL
Qty: 15 TABLET | Refills: 3 | Status: SHIPPED | OUTPATIENT
Start: 2022-01-01 | End: 2022-01-01

## 2022-01-01 RX ORDER — CHOLECALCIFEROL (VITAMIN D3) 125 MCG
5 CAPSULE ORAL NIGHTLY PRN
Qty: 30 TABLET | Refills: 0 | Status: SHIPPED | OUTPATIENT
Start: 2022-01-01 | End: 2022-01-01

## 2022-01-01 RX ADMIN — OXYBUTYNIN CHLORIDE 5 MG: 5 TABLET ORAL at 17:52

## 2022-01-01 RX ADMIN — SODIUM CHLORIDE: 9 INJECTION, SOLUTION INTRAVENOUS at 01:16

## 2022-01-01 RX ADMIN — SODIUM CHLORIDE, POTASSIUM CHLORIDE, SODIUM LACTATE AND CALCIUM CHLORIDE 1000 ML: 600; 310; 30; 20 INJECTION, SOLUTION INTRAVENOUS at 22:45

## 2022-01-01 RX ADMIN — OXYBUTYNIN CHLORIDE 5 MG: 5 TABLET ORAL at 17:15

## 2022-01-01 RX ADMIN — SENNOSIDES AND DOCUSATE SODIUM 2 TABLET: 50; 8.6 TABLET ORAL at 17:15

## 2022-01-01 RX ADMIN — ASPIRIN 81 MG CHEWABLE TABLET 81 MG: 81 TABLET CHEWABLE at 05:08

## 2022-01-01 RX ADMIN — OXYBUTYNIN CHLORIDE 5 MG: 5 TABLET ORAL at 20:29

## 2022-01-01 RX ADMIN — HEPARIN SODIUM 5000 UNITS: 5000 INJECTION, SOLUTION INTRAVENOUS; SUBCUTANEOUS at 01:16

## 2022-01-01 RX ADMIN — SODIUM CHLORIDE, POTASSIUM CHLORIDE, SODIUM LACTATE AND CALCIUM CHLORIDE 1000 ML: 600; 310; 30; 20 INJECTION, SOLUTION INTRAVENOUS at 21:05

## 2022-01-01 RX ADMIN — SODIUM CHLORIDE 1000 ML: 9 INJECTION, SOLUTION INTRAVENOUS at 22:13

## 2022-01-01 SDOH — ECONOMIC STABILITY: GENERAL

## 2022-01-01 ASSESSMENT — ENCOUNTER SYMPTOMS
AGGRESSION WITHIN DEFINED LIMITS: 1
HIGHEST PAIN SEVERITY IN PAST 24 HOURS: 0/10
SYNCOPE: 1
LIMITED RANGE OF MOTION: 1
ANGER WITHIN DEFINED LIMITS: 1
SLEEP QUALITY: ADEQUATE
SLEEP QUALITY: ADEQUATE
BOWEL INCONTINENCE: 1
NAUSEA: DENIES
SYNCOPE: 1
LOWEST PAIN SEVERITY IN PAST 24 HOURS: 0/10
PALPITATIONS: 1
BOWEL INCONTINENCE: 1
PAIN: PATIENT DENIES PAIN
DYSPNEA ON EXERTION: 1
SLEEP QUALITY: FAIR
SUBJECTIVE PAIN PROGRESSION: UNCHANGED
LAST BOWEL MOVEMENT: 66158
MUSCLE WEAKNESS: 1
LOWEST PAIN SEVERITY IN PAST 24 HOURS: 0/10
CHANGE IN APPETITE: DECREASED
FATIGUE: 1
FATIGUES EASILY: 1
PAIN: 1
LOWEST PAIN SEVERITY IN PAST 24 HOURS: 0/10
HYPOTENSION: 1
CONSTIPATION: 1
NAUSEA: DENIES
PERSON REPORTING PAIN: PATIENT
PAIN SEVERITY GOAL: 0/10
PERSON REPORTING PAIN: PATIENT
DENIES PAIN: 1
STOOL FREQUENCY: LESS THAN DAILY
FATIGUES EASILY: 1
CONSTIPATION: 1
STOOL FREQUENCY: LESS THAN DAILY
PAIN LOCATION: BACK
PAIN SEVERITY GOAL: 0/10
PERSON REPORTING PAIN: PATIENT
PERSON REPORTING PAIN: PATIENT
FORGETFULNESS: 1
HIGHEST PAIN SEVERITY IN PAST 24 HOURS: 0/10
STOOL FREQUENCY: LESS THAN DAILY
DENIES PAIN: 1
DENIES PAIN: 1
BOWEL INCONTINENCE: 1
FATIGUES EASILY: 1
BOWEL INCONTINENCE: 1
BOWEL INCONTINENCE: 1
HIGHEST PAIN SEVERITY IN PAST 24 HOURS: 0/10
SLEEP QUALITY: FAIR
FATIGUES EASILY: 1
POOR JUDGMENT: 1
DENIES PAIN: 1
DENIES PAIN: 1
PAIN SEVERITY GOAL: 0/10
MUSCLE WEAKNESS: 1
DENIES PAIN: 1
STOOL FREQUENCY: LESS THAN DAILY
SLEEP QUALITY: FAIR
STOOL FREQUENCY: LESS THAN DAILY
FATIGUE: 1
PAIN: 1
PERSON REPORTING PAIN: PATIENT
PAIN SEVERITY GOAL: 0/10
LOWEST PAIN SEVERITY IN PAST 24 HOURS: 0/10
DENIES PAIN: 1
DIARRHEA: 1
SUBJECTIVE PAIN PROGRESSION: UNCHANGED
FATIGUES EASILY: 1
BOWEL INCONTINENCE: 1
LAST BOWEL MOVEMENT: 66210
LIMITED RANGE OF MOTION: 1
PALPITATIONS: 1
LAST BOWEL MOVEMENT: 66210
DENIES PAIN: 1
BOWEL INCONTINENCE: 1
FATIGUES EASILY: 1
STOOL FREQUENCY: DAILY
PAIN SEVERITY GOAL: 0/10
FORGETFULNESS: 1
PAIN LOCATION - PAIN SEVERITY: 3/10
FATIGUES EASILY: 1
PERSON REPORTING PAIN: PATIENT
FORGETFULNESS: 1
PAIN LOCATION - PAIN FREQUENCY: INTERMITTENT
HYPOTENSION: 1
LAST BOWEL MOVEMENT: 66133
HIGHEST PAIN SEVERITY IN PAST 24 HOURS: 0/10
LAST BOWEL MOVEMENT: 66125
LAST BOWEL MOVEMENT: 66164
BOWEL INCONTINENCE: 1
LOWEST PAIN SEVERITY IN PAST 24 HOURS: 0/10
LAST BOWEL MOVEMENT: 66169
FATIGUES EASILY: 1
DENIES PAIN: 1
LAST BOWEL MOVEMENT: 66185
DENIES PAIN: 1
DENIES PAIN: 1
LAST BOWEL MOVEMENT: 66118
BOWEL INCONTINENCE: 1
PERSON REPORTING PAIN: PATIENT
PERSON REPORTING PAIN: PATIENT
STOOL FREQUENCY: LESS THAN DAILY
NAUSEA: DENIES
APPETITE LEVEL: POOR
FATIGUE: 1
BOWEL INCONTINENCE: 1
PERSON REPORTING PAIN: PATIENT
PERSON REPORTING PAIN: PATIENT
BOWEL INCONTINENCE: 1
LAST BOWEL MOVEMENT: 66209
PERSON REPORTING PAIN: PATIENT
FATIGUE: 1
UNABLE TO COMMUNICATE PAIN: 1
HIGHEST PAIN SEVERITY IN PAST 24 HOURS: 0/10
POOR JUDGMENT: 1
PERSON REPORTING PAIN: PATIENT
DENIES PAIN: 1
PERSON REPORTING PAIN: PATIENT
SLEEP QUALITY: FAIR
SLEEP QUALITY: ADEQUATE
DIFFICULTY THINKING: 1
PAIN: PATIENT DENIES PAIN DURING THIS VISIT
LAST BOWEL MOVEMENT: 66118
FATIGUE: 1
SLEEP QUALITY: ADEQUATE
DENIES PAIN: 1
CONSTIPATION: 1
SLEEP QUALITY: FAIR
STOOL FREQUENCY: LESS THAN DAILY
PALPITATIONS: 1
DENIES PAIN: 1
LAST BOWEL MOVEMENT: 66176
DYSPNEA ACTIVITY LEVEL: WHILE SPEAKING
BOWEL INCONTINENCE: 1
PERSON REPORTING PAIN: PATIENT
PAIN LOCATION - PAIN QUALITY: ACHE
FATIGUES EASILY: 1
VOMITING: DENIES
DENIES PAIN: 1
LAST BOWEL MOVEMENT: 66197
FATIGUES EASILY: 1
HYPOTENSION: 1
STOOL FREQUENCY: LESS THAN DAILY
SHORTNESS OF BREATH: 1
PERSON REPORTING PAIN: PATIENT
FATIGUES EASILY: 1
MUSCLE WEAKNESS: 1
DENIES PAIN: 1
PERSON REPORTING PAIN: PATIENT
PERSON REPORTING PAIN: PATIENT
STOOL FREQUENCY: LESS THAN DAILY
LAST BOWEL MOVEMENT: 66148
DENIES PAIN: 1
STOOL FREQUENCY: LESS THAN DAILY
SLEEP QUALITY: FAIR
FATIGUES EASILY: 1
VOMITING: DENIES
SLEEP QUALITY: FAIR
FATIGUES EASILY: 1
PERSON REPORTING PAIN: PATIENT
DIFFICULTY THINKING: 1
BOWEL INCONTINENCE: 1
VOMITING: DENIES
STOOL FREQUENCY: LESS THAN DAILY

## 2022-01-01 ASSESSMENT — ACTIVITIES OF DAILY LIVING (ADL)
CURRENT_FUNCTION: ONE PERSON
AMBULATION ASSISTANCE ON FLAT SURFACES: 1
EATING_REQUIRES_ASSISTANCE: 1
PHYSICAL_TRANSFER_REQUIRES_ASSISTANCE: 1
PHYSICAL_TRANSFER_REQUIRES_ASSISTANCE: 1
CONTINENCE_REQUIRES_ASSISTANCE: 1
BATHING_REQUIRES_ASSISTANCE: 1
AMBULATION ASSISTANCE: ONE PERSON
CURRENT_FUNCTION: ONE PERSON
DRESSING_REQUIRES_ASSISTANCE: 1
BATHING_REQUIRES_ASSISTANCE: 1
DRESSING_REQUIRES_ASSISTANCE: 1
AMBULATION_REQUIRES_ASSISTANCE: 1
AMBULATION ASSISTANCE ON FLAT SURFACES: 1
MONEY MANAGEMENT (EXPENSES/BILLS): TOTALLY DEPENDENT
CONTINENCE_REQUIRES_ASSISTANCE: 1
AMBULATION ASSISTANCE: NON-AMBULATORY

## 2022-01-01 ASSESSMENT — FIBROSIS 4 INDEX: FIB4 SCORE: 1.45

## 2022-01-01 ASSESSMENT — PAIN SCALES - PAIN ASSESSMENT IN ADVANCED DEMENTIA (PAINAD)
FACIALEXPRESSION: 0 - SMILING OR INEXPRESSIVE.
TOTALSCORE: 3
TOTALSCORE: 0
FACIALEXPRESSION: 0 - SMILING OR INEXPRESSIVE.
CONSOLABILITY: 0 - NO NEED TO CONSOLE.
CONSOLABILITY: 1 - DISTRACTED OR REASSURED BY VOICE OR TOUCH.
FACIALEXPRESSION: 0 - SMILING OR INEXPRESSIVE.
BODYLANGUAGE: 0 - RELAXED.
FACIALEXPRESSION: 0 - SMILING OR INEXPRESSIVE.
BODYLANGUAGE: 0 - RELAXED.
NEGVOCALIZATION: 0 - NONE.
TOTALSCORE: 2
FACIALEXPRESSION: 0 - SMILING OR INEXPRESSIVE.
TOTALSCORE: 0
TOTALSCORE: 0
BODYLANGUAGE: 0 - RELAXED.
CONSOLABILITY: 0 - NO NEED TO CONSOLE.
FACIALEXPRESSION: 0 - SMILING OR INEXPRESSIVE.
TOTALSCORE: 0
CONSOLABILITY: 0 - NO NEED TO CONSOLE.
NEGVOCALIZATION: 0 - NONE.
NEGVOCALIZATION: 0 - NONE.
BODYLANGUAGE: 0 - RELAXED.
BODYLANGUAGE: 1 - TENSE. DISTRESSED PACING. FIDGETING.
TOTALSCORE: 0
NEGVOCALIZATION: 0 - NONE.
BODYLANGUAGE: 0 - RELAXED.
NEGVOCALIZATION: 0 - NONE.
BODYLANGUAGE: 1 - TENSE. DISTRESSED PACING. FIDGETING.
NEGVOCALIZATION: 1 - OCCASIONAL MOAN OR GROAN. LOW-LEVEL SPEECH WITH A NEGATIVE OR DISAPPROVING QUALITY.
CONSOLABILITY: 0 - NO NEED TO CONSOLE.
CONSOLABILITY: 1 - DISTRACTED OR REASSURED BY VOICE OR TOUCH.
NEGVOCALIZATION: 0 - NONE.
CONSOLABILITY: 0 - NO NEED TO CONSOLE.
FACIALEXPRESSION: 0 - SMILING OR INEXPRESSIVE.

## 2022-01-01 ASSESSMENT — PAIN DESCRIPTION - PAIN TYPE
TYPE: ACUTE PAIN
TYPE: ACUTE PAIN;CHRONIC PAIN
TYPE: ACUTE PAIN;CHRONIC PAIN
TYPE: ACUTE PAIN

## 2022-01-01 ASSESSMENT — PATIENT HEALTH QUESTIONNAIRE - PHQ9: CLINICAL INTERPRETATION OF PHQ2 SCORE: 0

## 2022-01-01 ASSESSMENT — SOCIAL DETERMINANTS OF HEALTH (SDOH)
ACTIVE STRESSOR - NO STRESS FACTORS: 1
ACTIVE STRESSOR - EXHAUSTION: 1
ACTIVE STRESSOR - NO STRESS FACTORS: 1

## 2022-01-01 NOTE — CASE COMMUNICATION
noted  ----- Message -----  From: Gayle Choi, PT  Sent: 12/31/2021   8:46 PM PST  To: Gayle Roberts R.N.      Please send to Dr. Mike Guajardo.    Primary dx/Skilled need: Frontal subcortical ischemic infarcts, Htn, CKD, Dementia  PT: 1w1, 2w4  Zip code: [address]  Disciplines ordered: Skilled nursing, physical and occupational therapy; adding home health aide, speech therapy and dietician  Insurance & authorization: [employer]  Cert ification period: 12/31/2021 - 2/28/2022  Special considerations: none

## 2022-01-02 NOTE — CASE COMMUNICATION
"Pt has ongoing diarrhea for last 3 days. Per primary cg/granddaughter, diarrhea \"smells foul and unusual\" and started out as loose and gelantinous but is now dark and mostly watery.    Requesting an order for stool sample asap."

## 2022-01-02 NOTE — CASE COMMUNICATION
"Please send request to PCP [facility]  ----- Message -----  From: Ana Umanzor R.N.  Sent: 1/1/2022   4:26 PM PST  To: Gayle Roberts R.N.      Pt has ongoing diarrhea for last 3 days. Per primary cg/granddaughter, diarrhea \"smells foul and unusual\" and started out as loose and gelantinous but is now dark and mostly watery.    Requesting an order for stool sample asap."

## 2022-01-02 NOTE — CASE COMMUNICATION
"noted  ----- Message -----  From: Ana Umanzor R.N.  Sent: 1/1/2022   4:26 PM PST  To: Gayle Roberts R.N.      Pt has ongoing diarrhea for last 3 days. Per primary cg/granddaughter, diarrhea \"smells foul and unusual\" and started out as loose and gelantinous but is now dark and mostly watery.    Requesting an order for stool sample asap."

## 2022-01-03 NOTE — CASE COMMUNICATION
"Please send order to Sesar Guajardo  ----- Message -----  From: Ana Umanzor R.N.  Sent: 1/1/2022   4:26 PM PST  To: Gayle Roberts R.N.      Pt has ongoing diarrhea for last 3 days. Per primary cg/granddaughter, diarrhea \"smells foul and unusual\" and started out as loose and gelantinous but is now dark and mostly watery.    Requesting an order for stool sample asap."

## 2022-01-03 NOTE — PROGRESS NOTES
Medication chart review for [facility] services    PCP:  Mike Guajardo M.D.  [address] [address]  [place] [address]  Fax: [phone]    Current medication list     Current Outpatient Medications:   •  aspirin, 81 mg, Oral, DAILY  •  atorvastatin, 40 mg, Oral, Q EVENING  •  diclofenac sodium, 1 g, Topical, QDAY PRN  •  traZODone, 50 mg, Oral, Nightly  •  oxybutynin, 5 mg, Oral, Q EVENING  •  irbesartan-hydrochlorothiazide, 1 Tablet, Oral, DAILY  •  acetaminophen, 650 mg, Oral, QAM    No Known Allergies    Labs     Lab Results   Component Value Date/Time    SODIUM 138 12/28/2021 01:05 AM    POTASSIUM 4.4 12/28/2021 01:05 AM    CHLORIDE 104 12/28/2021 01:05 AM    CO2 19 (L) 12/28/2021 01:05 AM    GLUCOSE 89 12/28/2021 01:05 AM    BUN 57 (H) 12/28/2021 01:05 AM    CREATININE 3.23 (H) 12/28/2021 01:05 AM    CREATININE 1.2 02/10/2009 04:15 PM     Lab Results   Component Value Date/Time    ALKPHOSPHAT 72 12/25/2021 11:30 PM    ASTSGOT 14 12/25/2021 11:30 PM    ALTSGPT <5 12/25/2021 11:30 PM    TBILIRUBIN 0.3 12/25/2021 11:30 PM    INR 1.16 (H) 12/25/2021 06:30 PM    ALBUMIN 2.9 (L) 12/25/2021 11:30 PM        Assessment and Plan:   • Received referral from [facility]. Medications reviewed.       Virgilio Nevarez, PharmD, MS, BCACP, C  [facility]  Phone [phone] fax [phone]    This note was created using voice recognition software (Dragon). The accuracy of the dictation is limited by the abilities of the software. I have reviewed the note prior to signing, however some errors in grammar and context are still possible. If you have any questions related to this note please do not hesitate to contact our office.

## 2022-01-04 NOTE — Clinical Note
History pertinent to today's visit: Cerebral infarction due to embolism of unspecified cerebral artery , Skilled need: assess vs and all body sysems, monitor and teach per poc,  Pt/Cg response to the services provided: alert and oriented x 1, son states pt was diagnosed with dementia just recently, pts appetite is poor,however drinking one can of premiere protein shake/day which has 30 grams of protein and vitamins, instructed cg to offer fluids and snacks in between meals, educated son on high risk medication -trazadone 50 mg at bedtime, purpose,route, dosage, when it should be administered, potential side effects; increased risk fo confusion, dry mouth, constipation, dizziness, drownsiness and high risk for falls, discussed falls prevention strategies and son verbalized understanding, , pt is incontinent of bowel and bladder, Educated patient/care giver on how to avoid pressure ulcers.  Reviewed pressure redistribution surfaces, keeping skin dry, Incontinence care.  Frequent change in position and activity as tolerated.  educated are/givers how to avoid friction and sheer when moving patient.  Discussed hydration and nutritional needs.  .Instructed patient/caregiver on good hygiene, complete bladder emptying, and avoiding use of powders or lotions. Instructed on how to recognize symptoms of urinary tract infection including changes in mental status or confusion, frequency of urination, pain, hesitancy and urgency.verbalized understanding, PMD signed POLST, picture take for record/chard, answered all questions and concerns, vss,   Plan for the next visit: assess vs and all body systems, monitor and teach per poc. Case communication: as needed

## 2022-01-05 NOTE — CASE COMMUNICATION
noted  ----- Message -----  From: Carlene Simms SLP  Sent: 1/4/2022   2:15 PM PST  To: Gayle Roberts R.N.      SLP evaluation completed. Evaluation only.

## 2022-01-05 NOTE — CASE COMMUNICATION
Quality Review for 12.31.21 SOC OASIS performed on by LULY Bee RN on 1.5.2022:    Edits completed by LULY Bee RN:  1. Changed  to 12.28.21 per EPIC as date of DC  2. Changed  to yes, pt on trazadone and SN educated on per care plan on 1.4.22. Changed  to 1.4.22 per the collaboration convention  3. Added high risk medications, pressure ulcer prevention to safety measures  4. Added HTN and CVA to care plan

## 2022-01-06 PROBLEM — I48.91 ATRIAL FIBRILLATION WITH RVR (HCC): Status: ACTIVE | Noted: 2022-01-01

## 2022-01-06 NOTE — CASE COMMUNICATION
noted  ----- Message -----  From: Lissa Aguirre R.N.  Sent: 1/5/2022   8:43 AM PST  To: Gayle Roberts R.N.      History pertinent to today's visit: Cerebral infarction due to embolism of unspecified cerebral artery , Skilled need: assess vs and all body sysems, monitor and teach per poc,  Pt/Cg response to the services provided: alert and oriented x 1, son states pt was diagnosed with dementia just recently, pts appetite is poor,however d rinking one can of premiere protein shake/day which has 30 grams of protein and vitamins, instructed cg to offer fluids and snacks in between meals, educated son on high risk medication -trazadone 50 mg at bedtime, purpose,route, dosage, when it should be administered, potential side effects; increased risk fo confusion, dry mouth, constipation, dizziness, drownsiness and high risk for falls, discussed falls prevention strategies and so n verbalized understanding, , pt is incontinent of bowel and bladder, Educated patient/care giver on how to avoid pressure ulcers.  Reviewed pressure redistribution surfaces, keeping skin dry, Incontinence care.  Frequent change in position and activity as tolerated.  educated are/givers how to avoid friction and sheer when moving patient.  Discussed hydration and nutritional needs.  .Instructed patient/caregiver on good hygiene, comple te bladder emptying, and avoiding use of powders or lotions. Instructed on how to recognize symptoms of urinary tract infection including changes in mental status or confusion, frequency of urination, pain, hesitancy and urgency.verbalized understanding, PMD signed POLST, picture take for record/chard, answered all questions and concerns, vss,   Plan for the next visit: assess vs and all body systems, monitor and teach per poc. Case com munication: as needed

## 2022-01-06 NOTE — Clinical Note
pt in bed watching tv when sn came. granddaughter reported pt was up earlier with PT and ddi well. assessment done , vital signs already taken earlier during snv, alert and oriented x2, respond to nurse appropriately, pt said she is not in pain..no new medications per family. granddaughter and pt's son present . granddaughter reported that pt had been having diarrhea since after discharged from [facility]. states it stopped then yesterday patient ate well , diarrhea started last night again and this morning 3-4x . pt denies abdominal pain, cramping, n/v..pt also some hemorrhoids, no bleeding..states pt didnt eat well today had some jello .instructed on dehydration prevention, increased fluid intake.  pt takes ensure but not the whole one as reported. sacral area, reddish from diarrhea. no open areas . .grandddaughter applies desitin cream . noted small loose stools also in diaper, when checked. .incontinent care done , area sensitive. . instructed on pressure ulcer prevention, keep skin clean and dry, incontinent care, repositoning , adequate  nutrition/hydration.instructed on the importance of hand washing with soap/water . she also reported that pt run out of trazadone. she reported that pt was just seen by PCP earlier this week but didnt mentioned about diarrhea. ..this sn called  Dr ARCHIE Guajardo's office and spoke to his nurse re pt's complaints of diarrhea and will let MD know. Dr Guajardo called back while this sn still in pt's house, this nurse and granddaughter  spoke to him .she reported about diarrhea when did it start and how many times. Dr Guajardo wants stool sample for c-diff and will fax an order. [facility] fax number provided and also will refill trazadone, and order for hemorrhoid cream. I left container for stool with instructions, granddaughter said she can drop it to [facility] .  Pt/Cg response to the services provided: verbalized understanding. pt sleeping when sn left .

## 2022-01-06 NOTE — CASE COMMUNICATION
I agree with changes.  ----- Message -----  From: Mini Bee R.N.  Sent: 1/5/2022   1:13 PM PST  To: Gayle Choi PT      Quality Review for 12.31.21 SOC OASIS performed on by LULY Bee, DEANNA on 1.5.2022:    Edits completed by LULY Bee RN:  1. Changed  to 12.28.21 per EPIC as date of DC  2. Changed  to yes, pt on trazadone and SN educated on per care plan on 1.4.22. Changed  to 1.4.22 per the collaboration convention   3. Added high risk medications, pressure ulcer prevention to safety measures  4. Added HTN and CVA to care plan

## 2022-01-06 NOTE — CASE COMMUNICATION
Taryn please fax to Dr Guajardo: [phone]    Your pt is experiencing diarrhea for several days - foul smelling per granddaughter and tacky/gelantinous at start but is more watery now. Requesting order for stool sample/cx.

## 2022-01-07 PROBLEM — I95.9 HYPOTENSION: Status: ACTIVE | Noted: 2022-01-01

## 2022-01-07 PROBLEM — E87.29 HIGH ANION GAP METABOLIC ACIDOSIS: Status: ACTIVE | Noted: 2020-06-11

## 2022-01-07 NOTE — ASSESSMENT & PLAN NOTE
After extensive conversation with DPOA, patient was placed on hospice/comfort care  Focus on comfort, no lab draws

## 2022-01-07 NOTE — PROGRESS NOTES
Med rec completed historically from med rec done on 12/25/2021 and discharge paperwork   Unknown last doses of medications taken     Med rec from 12/25/2021  Med Rec completed per patient family interview with patient's granddaughter   Allergies reviewed  No ORAL antibiotics in last 30 days  Ok per patient to discuss medications with visitors present  home pharmacy: [id number] [address] [facility] and [place] [id number]

## 2022-01-07 NOTE — PROGRESS NOTES
4 Eyes Skin Assessment Completed by DEANNA Bishop and DEANNA Garntet.    Head WDL  Ears WDL  Nose WDL  Mouth WDL  Neck WDL  Breast/Chest WDL  Shoulder Blades WDL  Spine WDL  (R) Arm/Elbow/Hand WDL  (L) Arm/Elbow/Hand WDL  Abdomen WDL  Groin WDL  Scrotum/Coccyx/Buttocks WDL  (R) Leg WDL  (L) Leg WDL  (R) Heel/Foot/Toe WDL  (L) Heel/Foot/Toe WDL          Devices In Places Tele Box and Nasal Cannula      Interventions In Place Gray Ear Foams    Possible Skin Injury No    Pictures Uploaded Into Epic N/A  Wound Consult Placed N/A  RN Wound Prevention Protocol Ordered No

## 2022-01-07 NOTE — ED NOTES
"This RN back to bedside to attempt IV start w/ US guidance. Pt initially refusing, then \" allowed one attempt\" . Once IV inserted, pt became upset, yelling to \" take it out\". Pt states \" no more sticks\". Pt refusing IV.  IV discontinued, primary care RN Lilia notified that pt is still refusing.   "

## 2022-01-07 NOTE — ASSESSMENT & PLAN NOTE
After extensive conversation with DPOA, patient was placed on hospice/comfort care  Encourage p.o. intake  Focus on comfort

## 2022-01-07 NOTE — PROGRESS NOTES
Patient's IV has infiltrated. This was placed by the charge nurse of ED after several attempts. Patient is refusing IV placement at this time. Dr. Clark notified.

## 2022-01-07 NOTE — DISCHARGE PLANNING
Anticipated Discharge Disposition: Home with hospice    Action: LSW notified that pt's family wants to pursue hospice. LSW met with pt's CHRISSY Rehman at bedside. LSW explained process. LSW provided Sherie with hospice list. Per Sherie, she will look over hospice list and chose agency this afternoon. Sherie states pt lives with her at address on face sheet.    Barriers to Discharge: hospice acceptance and set up    Plan: LSW to follow up with Sherie for hospice choice.    1525: LSW notified that hospice choice is completed in the chart. LSW collected choice and faxed to [facility].

## 2022-01-07 NOTE — ED NOTES
This RN from Vascular Access Team, in to speak with pt regarding IV placement, pt was willing to let me take a look with the US machine for IV placement, then adamantly refused to allow an IV start. Pt's primary RN Lilia notified of pt's refusal for IV start at this time.

## 2022-01-07 NOTE — CARE PLAN
Problem: Nutritional:  Goal: Achieve adequate nutritional intake  Description: Patient will consume 25-50% of meals/supplements  Outcome: Not Met

## 2022-01-07 NOTE — ED PROVIDER NOTES
"ED Provider Note    CHIEF COMPLAINT  Chief Complaint   Patient presents with   • Syncope     per EMS pt was sitting on side of bed and passed out. Pt with low BP and new onset A-fib    • Hypotension       HPI  Julito Hinton is a 89 y.o. female who presents to the ER by ambulance after the patient was sitting on the side of the bed and passed out. Patient was found to have low blood pressure and new onset atrial fibrillation. She was in rapid ventricular response. She was just discharged from the hospital few days ago after being diagnosed with a stroke, thought to be embolic in nature. There was no findings of atrial fibrillation during her stay per review of the records. Neurology recommended 81 mg aspirin and a statin and to monitor outpatient for arrhythmias and atrial fibrillation. Patient says she \"feels cold.\" Temperature is 96 degrees. She is asking when she can go home. She follows all commands. She denies chest pain. She denies headache. She denies abdominal pain. Review of the notes from home health today reveal that patient has been having diarrhea on and off since discharge with multiple episodes over the last several days. Patient denied abdominal pain to home health today. She was not tachycardic upon vital sign check today with home health. Patient is a poor historian.    REVIEW OF SYSTEMS  See HPI for further details. ROS unable to be fully obtained due to clinical condition.    PAST MEDICAL HISTORY  Past Medical History:   Diagnosis Date   • Chronic migraine    • Hypertension        FAMILY HISTORY  No family history on file.    SOCIAL HISTORY  Social History     Socioeconomic History   • Marital status:      Spouse name: Not on file   • Number of children: Not on file   • Years of education: Not on file   • Highest education level: Not on file   Occupational History   • Not on file   Tobacco Use   • Smoking status: Never Smoker   • Smokeless tobacco: Never Used   Vaping Use   • Vaping " Use: Never used   Substance and Sexual Activity   • Alcohol use: No   • Drug use: No   • Sexual activity: Not on file   Other Topics Concern   • Not on file   Social History Narrative   • Not on file     Social Determinants of Health     Financial Resource Strain:    • Difficulty of Paying Living Expenses: Not on file   Food Insecurity:    • Worried About Running Out of Food in the Last Year: Not on file   • Ran Out of Food in the Last Year: Not on file   Transportation Needs:    • Lack of Transportation (Medical): Not on file   • Lack of Transportation (Non-Medical): Not on file   Physical Activity:    • Days of Exercise per Week: Not on file   • Minutes of Exercise per Session: Not on file   Stress:    • Feeling of Stress : Not on file   Social Connections:    • Frequency of Communication with Friends and Family: Not on file   • Frequency of Social Gatherings with Friends and Family: Not on file   • Attends Taoism Services: Not on file   • Active Member of Clubs or Organizations: Not on file   • Attends Club or Organization Meetings: Not on file   • Marital Status: Not on file   Intimate Partner Violence:    • Fear of Current or Ex-Partner: Not on file   • Emotionally Abused: Not on file   • Physically Abused: Not on file   • Sexually Abused: Not on file   Housing Stability:    • Unable to Pay for Housing in the Last Year: Not on file   • Number of Places Lived in the Last Year: Not on file   • Unstable Housing in the Last Year: Not on file       SURGICAL HISTORY  No past surgical history on file.    CURRENT MEDICATIONS  Home Medications    **Home medications have not yet been reviewed for this encounter**         ALLERGIES  No Known Allergies    PHYSICAL EXAM  VITAL SIGNS: BP (!) 92/78   Pulse 83   Resp 17   SpO2 (!) 83%      Constitutional: Well developed, well nourished; No acute distress; Non-toxic appearance.   HENT: Normocephalic, atraumatic; Bilateral external ears normal; dry mucous membranes  Eyes:  PERRL, EOMI, Conjunctiva normal. No discharge.   Neck:  Supple, nontender midline; No stridor; No nuchal rigidity.   Lymphatic: No cervical lymphadenopathy noted.   Cardiovascular: Regular rate and rhythm without murmurs, rubs, or gallop.   Thorax & Lungs: No respiratory distress, crackles right base, otherwise clear. Nontender chest wall. No crepitus or subcutaneous air  Abdomen: Soft, nontender, bowel sounds normal. No obvious masses; No pulsatile masses; no rebound, guarding, or peritoneal signs.   Skin: Good color; warm and dry without rash or petechia.  Back: Nontender, No CVA tenderness.    Extremities: Distal radial, dorsalis pedis, posterior tibial pulses are equal bilaterally; No edema; Nontender calves or saphenous, No cyanosis, No clubbing.   Musculoskeletal: Good range of motion in all major joints. No tenderness to palpation or major deformities noted.   Neurologic: Alert & oriented x 4, clear speech      EKG  EKG #1 time 2116. Increased baseline artifact. Atrial fibrillation with rapid ventricular response with a rate of 151. Diffuse mild ST depression, likely rate related. Low voltage in limb leads. No ST elevation.     EKG #2 time 2207. Rate of 135. Atrial flutter. Normal axis. Normal intervals. No ST elevation. Minimal ST depression in the anterolateral leads.    EKG #3 time 0022. Rate of 84. Sinus rhythm. Low voltage in the limb leads. T waves inverted in 1 and aVF. No ST elevation or depression.        RADIOLOGY/PROCEDURES  DX-CHEST-PORTABLE (1 VIEW)   Final Result      1.  Extensive atherosclerosis of the aorta again noted.   2.  There is minimal hazy interstitial opacity in the left lower lobe which could be due to chronic scarring or atelectasis.          COURSE & MEDICAL DECISION MAKING  Pertinent Labs & Imaging studies reviewed. (See chart for details)  Results for orders placed or performed during the hospital encounter of 01/06/22   CBC WITH DIFFERENTIAL   Result Value Ref Range    WBC 10.9  (H) 4.8 - 10.8 K/uL    RBC 3.87 (L) 4.20 - 5.40 M/uL    Hemoglobin 10.7 (L) 12.0 - 16.0 g/dL    Hematocrit 35.2 (L) 37.0 - 47.0 %    MCV 91.0 81.4 - 97.8 fL    MCH 27.6 27.0 - 33.0 pg    MCHC 30.4 (L) 33.6 - 35.0 g/dL    RDW 55.9 (H) 35.9 - 50.0 fL    Platelet Count 423 164 - 446 K/uL    MPV 10.2 9.0 - 12.9 fL    Neutrophils-Polys 76.00 (H) 44.00 - 72.00 %    Lymphocytes 19.30 (L) 22.00 - 41.00 %    Monocytes 3.60 0.00 - 13.40 %    Eosinophils 0.30 0.00 - 6.90 %    Basophils 0.40 0.00 - 1.80 %    Immature Granulocytes 0.40 0.00 - 0.90 %    Nucleated RBC 0.00 /100 WBC    Neutrophils (Absolute) 8.33 (H) 2.00 - 7.15 K/uL    Lymphs (Absolute) 2.11 1.00 - 4.80 K/uL    Monos (Absolute) 0.39 0.00 - 0.85 K/uL    Eos (Absolute) 0.03 0.00 - 0.51 K/uL    Baso (Absolute) 0.04 0.00 - 0.12 K/uL    Immature Granulocytes (abs) 0.04 0.00 - 0.11 K/uL    NRBC (Absolute) 0.00 K/uL   COMP METABOLIC PANEL   Result Value Ref Range    Sodium 137 135 - 145 mmol/L    Potassium 4.7 3.6 - 5.5 mmol/L    Chloride 99 96 - 112 mmol/L    Co2 14 (L) 20 - 33 mmol/L    Anion Gap 24.0 (H) 7.0 - 16.0    Glucose 96 65 - 99 mg/dL    Bun 80 (HH) 8 - 22 mg/dL    Creatinine 3.68 (H) 0.50 - 1.40 mg/dL    Calcium 9.0 8.4 - 10.2 mg/dL    AST(SGOT) 21 12 - 45 U/L    ALT(SGPT) 6 2 - 50 U/L    Alkaline Phosphatase 93 30 - 99 U/L    Total Bilirubin 0.3 0.1 - 1.5 mg/dL    Albumin 3.7 3.2 - 4.9 g/dL    Total Protein 8.0 6.0 - 8.2 g/dL    Globulin 4.3 (H) 1.9 - 3.5 g/dL    A-G Ratio 0.9 g/dL   LIPASE   Result Value Ref Range    Lipase 55 7 - 58 U/L   TROPONIN   Result Value Ref Range    Troponin T 105 (H) 6 - 19 ng/L   proBrain Natriuretic Peptide, NT   Result Value Ref Range    NT-proBNP 2013 (H) 0 - 125 pg/mL   TSH   Result Value Ref Range    TSH 5.250 0.380 - 5.330 uIU/mL   FREE THYROXINE   Result Value Ref Range    Free T-4 1.20 0.93 - 1.70 ng/dL   ESTIMATED GFR   Result Value Ref Range    GFR If  14 (A) >60 mL/min/1.73 m 2    GFR If Non African  American 11 (A) >60 mL/min/1.73 m 2   EKG   Result Value Ref Range    Report       [facility] Emergency Dept.    Test Date:  2022  Pt Name:    [facility]          Department: [facility]  MRN:        [id number]                      Room:       [facility] 3  Gender:     Female                       Technician: CJ  :        [id number]                   Requested By:ER TRIAGE PROTOCOL  Order #:    [id number]                    Reading MD:    Measurements  Intervals                                Axis  Rate:       135                          P:  NY:                                      QRS:        17  QRSD:       131                          T:          49  QT:         357  QTc:        536    Interpretive Statements  Atrial flutter  Ventricular premature complex  Nonspecific intraventricular conduction delay  Borderline ST depression, anterolateral leads  Compared to ECG 2022 21:16:42  Intraventricular conduction delay now present  ST (T wave) deviation now present  Atrial fibrillation no longer present  Early repolarization no longer  present     EKG (NOW)   Result Value Ref Range    Report       [facility] Emergency Dept.    Test Date:  2022  Pt Name:    [facility]          Department: EDS  MRN:        [id number]                      Room:       [facility] 3  Gender:     Female                       Technician: [id number]  :        [id number]                   Requested By:ED MO  Order #:    [id number]                    Reading MD:    Measurements  Intervals                                Axis  Rate:       150                          P:  NY:                                      QRS:        -81  QRSD:       99                           T:          61  QT:         313  QTc:        495    Interpretive Statements  Atrial fibrillation with rapid V-rate  Ventricular premature complex  Low voltage, extremity leads  Repolarization abnormality, prob  rate related  Compared to ECG 2021 16:47:28  Ventricular premature complex(es) now present  Low QRS voltage now present  Early repolarization now present  Sinus rhythm no longer present     EKG (NOW)   Result Value Ref Range    Report       [facility] Emergency Dept.    Test Date:  2022  Pt Name:    SHERRIE MCKENZIE          Department: [facility]  MRN:        [id number]                      Room:       3306  Gender:     Female                       Technician: BRADY  :        [id number]                   Requested By:ED MO  Order #:    [id number]                    Reading MD:    Measurements  Intervals                                Axis  Rate:       84                           P:          -28  AZ:         174                          QRS:        -27  QRSD:       84                           T:          148  QT:         384  QTc:        454    Interpretive Statements  Sinus rhythm  Inferior infarct, old  Lateral leads are also involved  Lead(s) II were not used for morphology analysis  Compared to ECG 2022 22:07:49  Myocardial infarct finding now present  Atrial flutter no longer present  Ventricular premature complex(es) no longer present  Intraventricular conduction delay no long er present  ST (T wave) deviation no longer present         Patient presents to the ER by ambulance after passing out was sitting on the side of the bed. She was found to be hypotensive. Review of the home health notes reveal patient has been having diarrhea since discharge. She has been having multiple episodes of watery diarrhea a day. Patient was just discharged from the hospital last week after being diagnosed with a stroke, likely embolic in etiology. She was not found to be in atrial fibrillation at that time. However, today she is in A. fib with RVR. She was clinically quite dehydrated. Blood pressures were soft here in the ER, but she had bounding peripheral pulses and I think the  blood pressure readings were not accurate. Nonetheless, she was hydrated with 2.5 L of fluids and mentation is improved, she maintains good bounding peripheral pulses, and her heart rate has come down with rehydration. Additionally, her atrial fibrillation with rapid ventricular response has resolved and she is now converted on her own spontaneously into a normal sinus rhythm with a rate of 84. Creatinine is at baseline at nearly 4.0. BUN is elevated at 80 which is higher than it was last week. She has a bicarb of 14 consistent with diarrhea. She has an anion gap acidosis, likely secondary to some dehydration. I suspect that her A. fib with RVR was likely triggered by her dehydration as well. Currently she is normal sinus rhythm with adequate perfusion. She will need to be hospitalized for continued hydration and continued evaluation for new onset atrial fibrillation with rapid ventricular spots. Dr. Clark will make recommendations and order anticoagulation as he feels appropriate. Patient has no complaints. She follows all commands. She says she feels better. Oxygen saturations have been running in the high 90s on some oxygen. Initially there was concern that she could be hypoxic, but I suspect that the pulse oximeter was not reading correctly as you are having a really hard time getting reads on all the monitoring devices upon arrival due to patient being cold and shaking a bit. Once the patient was warmed up with Keely hugger, monitoring of her vital signs became much more accurate. At this time further evaluation and management be done by the hospitalist team.    Discussed with Dr. Clark, hospitalist on-call. He will currently evaluate the patient hospitalization.    CRITICAL CARE  The very real possibilty of a deterioration of this patient's condition required the highest level of my preparedness for sudden, emergent intervention.  I provided critical care services, which included medication orders, frequent  reevaluations of the patient's condition and response to treatment, ordering and reviewing test results, and discussing the case with various consultants.  The critical care time associated with the care of the patient was 40 minutes. Review chart for interventions. This time is exclusive of any other billable procedures.          FINAL IMPRESSION  1.  Syncope  2.   Hypotension  3.   acute kidney injury  4.   Anion gap acidosis  5.   New onset atrial fibrillation with rapid ventricular response  6.   Dehydration.  7. Elevated troponin     The critical care time associated with the care of the patient was 40 minutes    This dictation has been created using voice recognition software. The accuracy of the dictation is limited by the abilities of the software. I expect there may be some errors of grammar and possibly content. I made every attempt to manually correct the errors within my dictation. However, errors related to voice recognition software may still exist and should be interpreted within the appropriate context.    Electronically signed by: Angela Hanna M.D., 1/6/2022 9:33 PM

## 2022-01-07 NOTE — PROGRESS NOTES
Hospital Medicine Daily Progress Note    Date of Service  1/7/2022    Chief Complaint  Julito Hinton is a 89 y.o. female admitted 1/6/2022 with syncope    Hospital Course  89 y.o. female who presented 1/6/2022 with syncope and diarrhea. Patient at this point in time is mostly nonverbal, information obtained from the chart.  Apparently earlier today she was sitting on the side of her bed and passed out.  Patient was noted to have a low blood pressure as well as new onset atrial fibrillation.  Upon arrival to the emergency department, she was noted to be in atrial fibrillation with RVR, given IV fluids.  She was also noted to be dehydrated from diarrhea.  She was recently here, diagnosed with an acute stroke, embolic in nature, there were recommendations from neurology for outpatient monitoring of her heart rhythm.  I did discuss the case including labs and imaging with the ER physician.    Interval Problem Update  Evaluated bedside and found lethargic, sleepy  Blood pressure improvement without IV hydration  Patient saturating well on room air  I had an extensive conversation with patient's POA (Tamara) were we discussed patient's chronically worsening dementia within the past 3 years which also includes worsening functional status as well as quality of life, current results and poor prognosis.  Discuss medical management options as well as ideology behind hospice and comfort care.  After further discussion with other family members, POA requested that patient be placed in hospice.  POA would like to start some comfort care measures but would like to hold off on opiate and benzodiazepine medications as patient is currently not in any pain or dyspnea.  Discontinue blood pressure medications  Will now monitor blood pressure, vitals or telemetry monitoring, focus on comfort as per POA's wishes  Place referral for hospice    I have personally seen and examined the patient at bedside. I discussed the plan of care  with patient, family, bedside RN, charge RN and pharmacy.    Consultants/Specialty  None    Code Status  DNAR/DNI    Disposition  Patient is medically cleared for discharge.   Anticipate discharge to to hospice.  I have placed the appropriate orders for post-discharge needs.    Review of Systems  Review of Systems   Unable to perform ROS: Dementia        Physical Exam  Temp:  [35.7 °C (96.3 °F)-36.6 °C (97.9 °F)] 36.2 °C (97.2 °F)  Pulse:  [33-94] 66  Resp:  [12-42] 16  BP: ()/(50-78) 108/66  SpO2:  [77 %-100 %] 96 %    Physical Exam  Constitutional:       Appearance: She is ill-appearing.      Comments: Cachectic   HENT:      Head: Normocephalic and atraumatic.      Mouth/Throat:      Mouth: Mucous membranes are dry.   Eyes:      Extraocular Movements: Extraocular movements intact.      Pupils: Pupils are equal, round, and reactive to light.   Cardiovascular:      Rate and Rhythm: Normal rate and regular rhythm.      Pulses: Normal pulses.      Heart sounds: Normal heart sounds.   Pulmonary:      Effort: Pulmonary effort is normal.      Breath sounds: Normal breath sounds.   Abdominal:      General: Bowel sounds are normal. There is no distension.      Palpations: Abdomen is soft.      Tenderness: There is no abdominal tenderness. There is no guarding or rebound.   Musculoskeletal:         General: No swelling. Normal range of motion.      Cervical back: Normal range of motion and neck supple.   Skin:     General: Skin is warm.      Coloration: Skin is not jaundiced.   Neurological:      General: No focal deficit present.      Mental Status: She is oriented to person, place, and time. Mental status is at baseline.      Cranial Nerves: No cranial nerve deficit.   Psychiatric:         Mood and Affect: Mood normal.         Behavior: Behavior normal.         Thought Content: Thought content normal.         Judgment: Judgment normal.         Fluids    Intake/Output Summary (Last 24 hours) at 1/7/2022 1832  Last  data filed at 1/7/2022 0830  Gross per 24 hour   Intake 1250 ml   Output 200 ml   Net 1050 ml       Laboratory  Recent Labs     01/06/22 2105 01/07/22  0508   WBC 10.9* 6.2   RBC 3.87* 2.79*   HEMOGLOBIN 10.7* 7.9*   HEMATOCRIT 35.2* 27.6*   MCV 91.0 98.9*   MCH 27.6 28.3   MCHC 30.4* 28.6*   RDW 55.9* 62.3*   PLATELETCT 423 232   MPV 10.2 10.3     Recent Labs     01/06/22 2105 01/07/22  0508   SODIUM 137 132*   POTASSIUM 4.7 5.0   CHLORIDE 99 103   CO2 14* 14*   GLUCOSE 96 85   BUN 80* 71*   CREATININE 3.68* 3.07*   CALCIUM 9.0 7.7*                   Imaging  EC-ECHOCARDIOGRAM LTD W/O CONT   Final Result      DX-CHEST-PORTABLE (1 VIEW)   Final Result      1.  Extensive atherosclerosis of the aorta again noted.   2.  There is minimal hazy interstitial opacity in the left lower lobe which could be due to chronic scarring or atelectasis.           Assessment/Plan  * Atrial fibrillation with RVR (HCC)- (present on admission)  Assessment & Plan  After extensive conversation with DPOA, patient was placed on hospice/comfort care  Focus on comfort, no lab draws, discontinue telemetry, no blood pressure or vitals    Cerebrovascular accident (CVA) due to embolism (HCC)- (present on admission)  Assessment & Plan  After extensive conversation with DPOA, patient was placed on hospice/comfort care  Focus on comfort    Stage 3a chronic kidney disease (HCC)- (present on admission)  Assessment & Plan  After extensive conversation with DPOA, patient was placed on hospice/comfort care  Focus on comfort, no lab draws    High anion gap metabolic acidosis- (present on admission)  Assessment & Plan  After extensive conversation with DPOA, patient was placed on hospice/comfort care  Focus on comfort, no lab draws    Normocytic anemia- (present on admission)  Assessment & Plan  After extensive conversation with DPOA, patient was placed on hospice/comfort care  Focus on comfort, no lab draws    Syncope- (present on admission)  Assessment  & Plan  After extensive conversation with DPOA, patient was placed on hospice/comfort care  Focus on comfort    Hypotension- (present on admission)  Assessment & Plan  Resolved  After extensive conversation with DPOA, patient was placed on hospice/comfort care  Focus on comfort, no lab draws    Dehydration- (present on admission)  Assessment & Plan  After extensive conversation with DPOA, patient was placed on hospice/comfort care  Encourage p.o. intake  Focus on comfort       VTE prophylaxis: pharmacologic prophylaxis contraindicated due to Patient is hospice    I have performed a physical exam and reviewed and updated ROS and Plan today (1/7/2022). In review of yesterday's note (1/6/2022), there are no changes except as documented above.

## 2022-01-07 NOTE — CASE COMMUNICATION
noted  ----- Message -----  From: Bambi Patel R.N.  Sent: 1/6/2022   7:23 PM PST  To: Gayle Roberts R.N.      pt in bed watching tv when sn came. granddaughter reported pt was up earlier with PT and ddi well. assessment done , vital signs already taken earlier during snv, alert and oriented x2, respond to nurse appropriately, pt said she is not in pain..no new medications per family. granddaughter and pt's son present . granddaughter  reported that pt had been having diarrhea since after discharged from [facility]. states it stopped then yesterday patient ate well , diarrhea started last night again and this morning 3-4x . pt denies abdominal pain, cramping, n/v..pt also some hemorrhoids, no bleeding..states pt didnt eat well today had some jello .instructed on dehydration prevention, increased fluid intake.  pt takes ensure but not the whole one as reported. sacral ar ea, reddish from diarrhea. no open areas . .grandddaughter applies desitin cream . noted small loose stools also in diaper, when checked. .incontinent care done , area sensitive. . instructed on pressure ulcer prevention, keep skin clean and dry, incontinent care, repositoning , adequate  nutrition/hydration.instructed on the importance of hand washing with soap/water . she also reported that pt run out of trazadone. she reported that p t was just seen by PCP earlier this week but didnt mentioned about diarrhea. ..this sn called  Dr ARCHIE Guajardo's office and spoke to his nurse re pt's complaints of diarrhea and will let MD know. Dr Guajardo called back while this sn still in pt's house, this nurse and granddaughter  spoke to him .she reported about diarrhea when did it start and how many times. Dr Guajardo wants stool sample for c-diff and will fax an order. [facility] fax number provi ded and also will refill trazadone, and order for hemorrhoid cream. I left container for stool with instructions, granddaughter said she can drop it to [facility] .  Pt/Cg response to the  services provided: verbalized understanding. pt sleeping when sn left .

## 2022-01-07 NOTE — ASSESSMENT & PLAN NOTE
Resolved  After extensive conversation with DPOA, patient was placed on hospice/comfort care  Focus on comfort, no lab draws

## 2022-01-07 NOTE — CARE PLAN
The patient is Stable - Low risk of patient condition declining or worsening    Shift Goals  Clinical Goals: monitor neuro status  Patient Goals: go home    Progress made toward(s) clinical / shift goals:    Problem: Knowledge Deficit - Standard  Goal: Patient and family/care givers will demonstrate understanding of plan of care, disease process/condition, diagnostic tests and medications  Outcome: Not Progressing  Patient is not progressing towards the following goals:  Problem: Knowledge Deficit - Standard  Goal: Patient and family/care givers will demonstrate understanding of plan of care, disease process/condition, diagnostic tests and medications  Outcome: Not Progressing

## 2022-01-07 NOTE — ASSESSMENT & PLAN NOTE
After extensive conversation with DPOA, patient was placed on hospice/comfort care  Focus on comfort, no lab draws, discontinue telemetry, no blood pressure or vitals

## 2022-01-07 NOTE — PROGRESS NOTES
Report received from night RN. Plan of care discussed. Patient resting comfortably in bed. Safety precautions in place.

## 2022-01-07 NOTE — DIETARY
"Nutrition services: Day 1 of admit.  Julito Hinton is a 89 y.o. female with admitting DX of atrial fibrillation.    Consult received for BMI<19.    Attempted to see pt, with family and MD at bedside.    Assessment:  Weight: 40.4 kg (89 lb) - stand up scale  Height: 4' 11\" (149.9 cm)  Body mass index is 17.98 kg/m²., BMI classification: underweight  Diet/Intake: Soft and bite-sized, <25% x 1 meal    Evaluation:   1. PMH of chronic migraine and HTN. MD notes pt is mostly nonverbal with hx of dementia, presents with syncope and diarrhea.  2. Per admit screen, pt denies weight loss and decreased PO intake prior to admit.  3. Per weight hx, previous weight of 112 lbs on 11/26/21, which is 23 lb (20.5%) severe weight loss in ~ 2 months.  4. Unable to observe muscle/fat stores at this time.  5. Skin: No wounds or edema.  6. Labs: Na 132, BUN 71, Creat 3.07, GFR 14, A1C 5.8 on 12/27/21  7. Meds: lipitor, colace, zofran prn, bowel protocol, Ns infusion  8. Last BM: PTA    Malnutrition Risk: Pt is at risk with severe 20% weight loss in ~ 2 months, unable to fully assess at this time.    Recommendations/Plan:  1. Soft and bite-sized diet as tolerated. Will order Boost VHC/Plus with meals to promote PO intake.  2. Encourage intake of meals and supplements.  3. Document intake of all meals and supplements as % taken in ADL's to provide interdisciplinary communication across all shifts.   4. Monitor weight.  5. Nutrition rep will continue to see patient for ongoing meal and snack preferences.     RD following.            "

## 2022-01-07 NOTE — CARE PLAN
The patient is Stable - Low risk of patient condition declining or worsening    Shift Goals  Clinical Goals: urinalysis  Patient Goals: rest    Progress made toward(s) clinical / shift goals:  Patient declining IV but agreeable to increased oral intake of liquids. Urinalysis send and complete.       Problem: Knowledge Deficit - Standard  Goal: Patient and family/care givers will demonstrate understanding of plan of care, disease process/condition, diagnostic tests and medications  Outcome: Progressing     Problem: Fall Risk  Goal: Patient will remain free from falls  Outcome: Progressing       Patient is not progressing towards the following goals:

## 2022-01-07 NOTE — THERAPY
Missed Therapy     Patient Name: Julito Hinton  Age:  89 y.o., Sex:  female  Medical Record #: [id number]  Today's Date: 1/7/2022    Discussed missed therapy with RN       01/07/22 7403   Interdisciplinary Plan of Care Collaboration   IDT Collaboration with  Nursing;Occupational Therapist   Collaboration Comments On hold from therapy; Per RN family and pt are discussing comfort care vs hospice at this time. Will hold until plan of care has been established.

## 2022-01-07 NOTE — Clinical Note
Hold all Home Health services. Patient admitted to [facility] on 1/7/22 with A Fib with RVR.

## 2022-01-07 NOTE — PROGRESS NOTES
Telemetry Shift Summary    Rhythm: SR  HR Range: 69-78  Ectopy: N/A  Measurements: 0.20/0.081/0.42    Normal Values:  Rhythm: SR  HR Range:   Measurements: .012-.020/0.06-0.10/0.30-0.52

## 2022-01-07 NOTE — ASSESSMENT & PLAN NOTE
After extensive conversation with DPOA, patient was placed on hospice/comfort care  Focus on comfort

## 2022-01-07 NOTE — H&P
Hospital Medicine History & Physical Note    Date of Service  1/6/2022    Primary Care Physician  Mike Guajardo M.D.    Consultants  None    Specialist Names: None    Code Status  DNAR/DNI    Chief Complaint  Chief Complaint   Patient presents with   • Syncope     per EMS pt was sitting on side of bed and passed out. Pt with low BP and new onset A-fib    • Hypotension       History of Presenting Illness  Julito Hinton is a 89 y.o. female who presented 1/6/2022 with syncope and diarrhea.  Patient at this point in time is mostly nonverbal, information obtained from the chart.  Apparently earlier today she was sitting on the side of her bed and passed out.  Patient was noted to have a low blood pressure as well as new onset atrial fibrillation.  Upon arrival to the emergency department, she was noted to be in atrial fibrillation with RVR, given IV fluids.  She was also noted to be dehydrated from diarrhea.  She was recently here, diagnosed with an acute stroke, embolic in nature, there were recommendations from neurology for outpatient monitoring of her heart rhythm.  I did discuss the case including labs and imaging with the ER physician.    I discussed the plan of care with bedside RN.    Review of Systems  Review of Systems   Unable to perform ROS: Dementia       Past Medical History   has a past medical history of Chronic migraine and Hypertension.    Surgical History   has no past surgical history on file.     Family History  family history is not on file.   Family history reviewed with patient. There is no family history that is pertinent to the chief complaint.     Social History   reports that she has never smoked. She has never used smokeless tobacco. She reports that she does not drink alcohol and does not use drugs.    Allergies  No Known Allergies    Medications  Prior to Admission Medications   Prescriptions Last Dose Informant Patient Reported? Taking?   acetaminophen (TYLENOL) 650 MG CR tablet    Yes No   Sig: Take 650 mg by mouth every morning. Indications: Pain   aspirin (ASA) 81 MG Chew Tab chewable tablet   Yes No   Sig: Chew 1 Tablet every day.   atorvastatin (LIPITOR) 40 MG Tab   No No   Sig: Take 1 Tablet by mouth every evening.   diclofenac sodium (VOLTAREN) 1 % Gel   Yes No   Sig: Apply 1 g topically 1 time a day as needed (leg pain).   irbesartan-hydrochlorothiazide (AVALIDE) 300-12.5 MG per tablet   Yes No   Sig: Take 1 Tablet by mouth every day.   oxybutynin (DITROPAN) 5 MG Tab   Yes No   Sig: Take 5 mg by mouth every evening.   traZODone (DESYREL) 50 MG Tab   Yes No   Sig: Take 50 mg by mouth every evening.      Facility-Administered Medications: None       Physical Exam  Temp:  [35.7 °C (96.3 °F)-36.4 °C (97.5 °F)] 35.7 °C (96.3 °F)  Pulse:  [33-94] 87  Resp:  [13-42] 13  BP: ()/(50-78) 71/51  SpO2:  [77 %-100 %] 100 %  Blood Pressure : (!) 71/51   Temperature: (!) 35.7 °C (96.3 °F)   Pulse: 87   Respiration: 13   Pulse Oximetry: 100 %       Physical Exam  Vitals and nursing note reviewed.   Constitutional:       General: She is not in acute distress.     Appearance: She is well-developed. She is not diaphoretic.      Comments: Thin and frail appearing    HENT:      Head: Normocephalic and atraumatic.      Right Ear: External ear normal.      Left Ear: External ear normal.      Nose: Nose normal. No congestion or rhinorrhea.      Mouth/Throat:      Mouth: Mucous membranes are dry.      Pharynx: No oropharyngeal exudate.   Eyes:      General:         Right eye: No discharge.         Left eye: No discharge.      Extraocular Movements: Extraocular movements intact.   Neck:      Trachea: No tracheal deviation.   Cardiovascular:      Rate and Rhythm: Normal rate and regular rhythm.      Heart sounds: No murmur heard.  No friction rub. No gallop.    Pulmonary:      Effort: Pulmonary effort is normal. No respiratory distress.      Breath sounds: Normal breath sounds. No stridor. No wheezing or  rales.   Abdominal:      General: Bowel sounds are normal. There is no distension.      Palpations: Abdomen is soft.   Musculoskeletal:      Cervical back: Normal range of motion and neck supple.      Right lower leg: No edema.      Left lower leg: No edema.   Lymphadenopathy:      Cervical: No cervical adenopathy.   Skin:     General: Skin is warm and dry.      Findings: No erythema or rash.   Neurological:      Comments: Awake, mostly nonverbal    Psychiatric:         Cognition and Memory: Cognition is impaired. Memory is impaired.         Laboratory:  Recent Labs     01/06/22 2105   WBC 10.9*   RBC 3.87*   HEMOGLOBIN 10.7*   HEMATOCRIT 35.2*   MCV 91.0   MCH 27.6   MCHC 30.4*   RDW 55.9*   PLATELETCT 423   MPV 10.2     Recent Labs     01/06/22 2105   SODIUM 137   POTASSIUM 4.7   CHLORIDE 99   CO2 14*   GLUCOSE 96   BUN 80*   CREATININE 3.68*   CALCIUM 9.0     Recent Labs     01/06/22 2105   ALTSGPT 6   ASTSGOT 21   ALKPHOSPHAT 93   TBILIRUBIN 0.3   LIPASE 55   GLUCOSE 96         Recent Labs     01/06/22  2105   NTPROBNP 2013*         Recent Labs     01/06/22  2105   TROPONINT 105*       Imaging:  DX-CHEST-PORTABLE (1 VIEW)   Final Result      1.  Extensive atherosclerosis of the aorta again noted.   2.  There is minimal hazy interstitial opacity in the left lower lobe which could be due to chronic scarring or atelectasis.          EKG:  I have personally reviewed the images and compared with prior images.    Assessment/Plan:  I anticipate this patient will require at least two midnights for appropriate medical management, necessitating inpatient admission.    * Atrial fibrillation with RVR (HCC)- (present on admission)  Assessment & Plan  -I think this is likely paroxysmal, likely caused her stroke  -I think she is in A. fib RVR due to dehydration and hypotension, improved with IV fluids  -She currently is difficult to get an accurate read on telemetry, repeat EKG did show sinus rhythm with a rate in the mid  80s  -She did have a stroke, MRI which was diagnostic was on 12/26  -I do not think full dose anticoagulation is appropriate at this time but I do think she can start DVT dose heparin, I do not feel this will cause hemorrhagic transformation, they were smaller strokes however I would get neurology recommendations as far as full dose anticoagulation initiation    Hypotension- (present on admission)  Assessment & Plan  -Due to dehydration  -Also having difficulty reading her blood pressure, she currently has a strong radial pulse yet systolic blood pressure is being read at 70 which is clearly an accurate  -Continue IV fluids, bolus if needed    Cerebrovascular accident (CVA) due to embolism (HCC)- (present on admission)  Assessment & Plan  -Likely due to atrial fibrillation  -Continue home statin and aspirin  -Patient would benefit from full dose anticoagulation once cleared by neurology however she did recently have an acute stroke so this will not be started at this time, start DVT prophylaxis dose of heparin  -Obtain PT/OT, patient did have home health seeing her, this should resume at discharge    Stage 3a chronic kidney disease (HCC)- (present on admission)  Assessment & Plan  -Acute worsening due to dehydration and hypotension  -Start IV fluids  -Repeat BMP in the morning    High anion gap metabolic acidosis- (present on admission)  Assessment & Plan  -Likely due to dehydration  -Start IV fluids  -Repeat BMP in the morning    Normocytic anemia- (present on admission)  Assessment & Plan  -No sign of gross bleeding  -Repeat CBC in the morning    Syncope- (present on admission)  Assessment & Plan  -Due to atrial fibrillation with RVR as well as dehydration and hypotension  -She did recently have an echo, no need to repeat at this time  -preserved ejection fraction on echocardiogram    Dehydration- (present on admission)  Assessment & Plan  -Due to diarrhea, no recent antibiotics, she has not had a bowel movement  while she is in the ER, I do not think she has C. Difficile  -No sick contacts however she was in the hospital, ERP has ordered Covid      VTE prophylaxis: SCDs/TEDs and heparin ppx

## 2022-01-08 NOTE — PROGRESS NOTES
[facility] Medicine Daily Progress Note    Date of Service  1/8/2022    Chief Complaint  Julito Hinton is a 89 y.o. female admitted 1/6/2022 with syncope    Hospital Course  89 y.o. female who presented 1/6/2022 with syncope and diarrhea. Patient at this point in time is mostly nonverbal, information obtained from the chart.  Apparently earlier today she was sitting on the side of her bed and passed out.  Patient was noted to have a low blood pressure as well as new onset atrial fibrillation.  Upon arrival to the emergency department, she was noted to be in atrial fibrillation with RVR, given IV fluids.  She was also noted to be dehydrated from diarrhea.  She was recently here, diagnosed with an acute stroke, embolic in nature, there were recommendations from neurology for outpatient monitoring of her heart rhythm.  I did discuss the case including labs and imaging with the ER physician.    Interval Problem Update  Patient evaluated bedside and in no acute distress  Patient on comfort care as per DPOA  Hospice referral was placed    Medically clear  Pending hospice placement      I have personally seen and examined the patient at bedside. I discussed the plan of care with patient, family, bedside RN, charge RN and pharmacy.    Consultants/Specialty  None    Code Status  DNAR/DNI    Disposition  Patient is medically cleared for discharge.   Anticipate discharge to to hospice.  I have placed the appropriate orders for post-discharge needs.    Review of Systems  Patient is comfort care    Physical Exam  Patient is comfort care    Fluids  No intake or output data in the 24 hours ending 01/08/22 1028    Laboratory  Recent Labs     01/06/22 2105 01/07/22  0508   WBC 10.9* 6.2   RBC 3.87* 2.79*   HEMOGLOBIN 10.7* 7.9*   HEMATOCRIT 35.2* 27.6*   MCV 91.0 98.9*   MCH 27.6 28.3   MCHC 30.4* 28.6*   RDW 55.9* 62.3*   PLATELETCT 423 232   MPV 10.2 10.3     Recent Labs     01/06/22 2105 01/07/22  0508   SODIUM 137 132*    POTASSIUM 4.7 5.0   CHLORIDE 99 103   CO2 14* 14*   GLUCOSE 96 85   BUN 80* 71*   CREATININE 3.68* 3.07*   CALCIUM 9.0 7.7*                   Imaging  EC-ECHOCARDIOGRAM LTD W/O CONT   Final Result      DX-CHEST-PORTABLE (1 VIEW)   Final Result      1.  Extensive atherosclerosis of the aorta again noted.   2.  There is minimal hazy interstitial opacity in the left lower lobe which could be due to chronic scarring or atelectasis.           Assessment/Plan  * Atrial fibrillation with RVR (HCC)- (present on admission)  Assessment & Plan  After extensive conversation with DPOA, patient was placed on hospice/comfort care  Focus on comfort, no lab draws, discontinue telemetry, no blood pressure or vitals    Cerebrovascular accident (CVA) due to embolism (HCC)- (present on admission)  Assessment & Plan  After extensive conversation with DPOA, patient was placed on hospice/comfort care  Focus on comfort    Stage 3a chronic kidney disease (HCC)- (present on admission)  Assessment & Plan  After extensive conversation with DPOA, patient was placed on hospice/comfort care  Focus on comfort, no lab draws    High anion gap metabolic acidosis- (present on admission)  Assessment & Plan  After extensive conversation with DPOA, patient was placed on hospice/comfort care  Focus on comfort, no lab draws    Normocytic anemia- (present on admission)  Assessment & Plan  After extensive conversation with DPOA, patient was placed on hospice/comfort care  Focus on comfort, no lab draws    Syncope- (present on admission)  Assessment & Plan  After extensive conversation with DPOA, patient was placed on hospice/comfort care  Focus on comfort    Hypotension- (present on admission)  Assessment & Plan  Resolved  After extensive conversation with DPOA, patient was placed on hospice/comfort care  Focus on comfort, no lab draws    Dehydration- (present on admission)  Assessment & Plan  After extensive conversation with DPOA, patient was placed on  hospice/comfort care  Encourage p.o. intake  Focus on comfort       VTE prophylaxis: pharmacologic prophylaxis contraindicated due to Patient is hospice    I have performed a physical exam and reviewed and updated ROS and Plan today (1/8/2022). In review of yesterday's note (1/7/2022), there are no changes except as documented above.

## 2022-01-08 NOTE — PROGRESS NOTES
Telemetry Shift Summary    Rhythm SR   HR Range 58-64  Ectopy rPVC  Measurement .18/.08/40    Normal Values  Rhythm SR  HR Range   Measurement 0.12-.020 / 0.06-0.10 / 0.30-0.52

## 2022-01-08 NOTE — DISCHARGE PLANNING
Anticipated Discharge Disposition:   Home with hospice    Action:   Chart review complete.     Per MD, patient is cleared to discharge to home with hospice. [facility] is pending and is working with the family to coordinate discussion.     1120: DEANNA VALVERDE sent Barrington Daugherty a message over voalte asking for any updates.     Barriers to Discharge:   Hospice acceptance     Plan:   Hospital care management will continue to assist with discharge planning needs.

## 2022-01-08 NOTE — CASE COMMUNICATION
noted  ----- Message -----  From: Nicky Pacheco R.N.  Sent: 1/7/2022  10:19 AM PST  To: Gayle Roberts R.N.      Hold all Home Health services. Patient admitted to [facility] on 1/7/22 with A Fib with RVR.

## 2022-01-08 NOTE — DISCHARGE PLANNING
Received Choice form at [phone]  Agency/Facility Name: [facility]  Referral sent per Choice form @ [phone]

## 2022-01-08 NOTE — CARE PLAN
The patient is Stable - Low risk of patient condition declining or worsening    Shift Goals  Clinical Goals: Comfort  Patient Goals: Rest, comfort    Progress made toward(s) clinical / shift goals:    Problem: Pain - Standard  Goal: Alleviation of pain or a reduction in pain to the patient’s comfort goal  Outcome: Progressing  Note: Pt has not expressed any pain.     Patient is not progressing towards the following goals:

## 2022-01-09 NOTE — CARE PLAN
The patient is stable and comfortable. Needing changed frequently due to high amount of stools.    Shift Goals  Clinical Goals: comfort, decreased stools  Patient Goals: Rest, comfort    Progress made toward(s) clinical / shift goals:  comfort and rest.    Patient is not progressing towards the following goals:

## 2022-01-09 NOTE — PROGRESS NOTES
Hospital Medicine Daily Progress Note    Date of Service  1/9/2022    Chief Complaint  Julito Hinton is a 89 y.o. female admitted 1/6/2022 with syncope    Hospital Course  89 y.o. female who presented 1/6/2022 with syncope and diarrhea. Patient at this point in time is mostly nonverbal, information obtained from the chart.  Apparently earlier today she was sitting on the side of her bed and passed out.  Patient was noted to have a low blood pressure as well as new onset atrial fibrillation.  Upon arrival to the emergency department, she was noted to be in atrial fibrillation with RVR, given IV fluids.  She was also noted to be dehydrated from diarrhea.  She was recently here, diagnosed with an acute stroke, embolic in nature, there were recommendations from neurology for outpatient monitoring of her heart rhythm.  I did discuss the case including labs and imaging with the ER physician.    Interval Problem Update  Patient evaluated bedside and in no acute distress  Patient on comfort care as per DPOA  Hospice referral was placed    Medically clear  Pending hospice placement  Aim for DC/transport on a.m.    I have personally seen and examined the patient at bedside. I discussed the plan of care with patient, family, bedside RN, charge RN and pharmacy.    Consultants/Specialty  None    Code Status  DNAR/DNI    Disposition  Patient is medically cleared for discharge.   Anticipate discharge to to hospice.  I have placed the appropriate orders for post-discharge needs.    Review of Systems  Patient is comfort care    Physical Exam  Patient is comfort care    Fluids    Intake/Output Summary (Last 24 hours) at 1/9/2022 0806  Last data filed at 1/8/2022 1044  Gross per 24 hour   Intake 150 ml   Output --   Net 150 ml       Laboratory  Recent Labs     01/06/22  2105 01/07/22  0508   WBC 10.9* 6.2   RBC 3.87* 2.79*   HEMOGLOBIN 10.7* 7.9*   HEMATOCRIT 35.2* 27.6*   MCV 91.0 98.9*   MCH 27.6 28.3   MCHC 30.4* 28.6*   RDW  55.9* 62.3*   PLATELETCT 423 232   MPV 10.2 10.3     Recent Labs     01/06/22  2105 01/07/22  0508   SODIUM 137 132*   POTASSIUM 4.7 5.0   CHLORIDE 99 103   CO2 14* 14*   GLUCOSE 96 85   BUN 80* 71*   CREATININE 3.68* 3.07*   CALCIUM 9.0 7.7*                   Imaging  EC-ECHOCARDIOGRAM LTD W/O CONT   Final Result      DX-CHEST-PORTABLE (1 VIEW)   Final Result      1.  Extensive atherosclerosis of the aorta again noted.   2.  There is minimal hazy interstitial opacity in the left lower lobe which could be due to chronic scarring or atelectasis.           Assessment/Plan  * Atrial fibrillation with RVR (HCC)- (present on admission)  Assessment & Plan  After extensive conversation with DPOA, patient was placed on hospice/comfort care  Focus on comfort, no lab draws, discontinue telemetry, no blood pressure or vitals    Cerebrovascular accident (CVA) due to embolism (HCC)- (present on admission)  Assessment & Plan  After extensive conversation with DPOA, patient was placed on hospice/comfort care  Focus on comfort    Stage 3a chronic kidney disease (HCC)- (present on admission)  Assessment & Plan  After extensive conversation with DPOA, patient was placed on hospice/comfort care  Focus on comfort, no lab draws    High anion gap metabolic acidosis- (present on admission)  Assessment & Plan  After extensive conversation with DPOA, patient was placed on hospice/comfort care  Focus on comfort, no lab draws    Normocytic anemia- (present on admission)  Assessment & Plan  After extensive conversation with DPOA, patient was placed on hospice/comfort care  Focus on comfort, no lab draws    Syncope- (present on admission)  Assessment & Plan  After extensive conversation with DPOA, patient was placed on hospice/comfort care  Focus on comfort    Hypotension- (present on admission)  Assessment & Plan  Resolved  After extensive conversation with DPOA, patient was placed on hospice/comfort care  Focus on comfort, no lab  draws    Dehydration- (present on admission)  Assessment & Plan  After extensive conversation with DPLEAH, patient was placed on hospice/comfort care  Encourage p.o. intake  Focus on comfort       VTE prophylaxis: pharmacologic prophylaxis contraindicated due to Patient is hospice    I have performed a physical exam and reviewed and updated ROS and Plan today (1/9/2022). In review of yesterday's note (1/8/2022), there are no changes except as documented above.

## 2022-01-09 NOTE — HOSPICE
Spoke to son bedside and granddaughter (POA) via phone. Family would like to take daughter home ASAP on hospice.   Hospice philosophy discussed, staff role, DME, mediations.  Patient approved for community hospice by Dr. Pitts   Patient hospital bed order from [facility] from delivery before 5 PM today.  Patient to discharge tomorrow morning Monday 1/10 at 9:30 via transportation. Teodora cook

## 2022-01-09 NOTE — PROGRESS NOTES
0650 Received report from DEANNA Shrestha. Pt is medically cleared, plan is to d/c with [facility] 1/10/2021. Safety measures in place, care assumed.     0750 Assessment complete.

## 2022-01-09 NOTE — CARE PLAN
The patient is Stable - Low risk of patient condition declining or worsening    Shift Goals  Clinical Goals: Comfort and Safety  Patient Goals: Comfort and Go Home  Family Goals: Comfort and Safety    Progress made toward(s) clinical / shift goals:  Interdisciplinary team following patient, family, and doctors note regarding treatment so that patient is treated with comfort as primary goal.     Patient is not progressing towards the following goals:      Problem: Knowledge Deficit - Standard  Goal: Patient and family/care givers will demonstrate understanding of plan of care, disease process/condition, diagnostic tests and medications  Outcome: Progressing   Patient demonstrated verbal understanding of education provided. Reinforcement required.   Problem: Fall Risk  Goal: Patient will remain free from falls  Outcome: Progressing   Patient demonstrated verbal understanding of education provided. Reinforcement required.

## 2022-01-09 NOTE — PROGRESS NOTES
Pt becoming mildly agitated, expressing desire to go home. DPOA granddaughter Tamara contacted via phone to provide update. Tamaar spoke with patient on the phone to reassure and calm her. Tamara inquired about providing medication to help calm patient and help her sleep. MD will be contacted for possible new order. Primary RN Fady updated.

## 2022-01-09 NOTE — DISCHARGE PLANNING
Anticipated Discharge Disposition: Home with Hospice     Action: LSW messaged [facility] RNClaudia to f/u with hospice referral via Voalte.     Barriers to Discharge: hospice acceptance     Plan: Await hospice acceptance, LSW to continue to follow and assist as needed    Addendum 0809  LSW informed by [facility] RNClaudia that she would be reaching out to family today.     Addendum 1326  [facility] requesting wheelchair transport for tomorrow 1/10 @0930.  LSW faxed transport form to Hermila TAYLOR.      LSW called pt's granddaughter requesting number of stairs to get into the home. No answer. LSW left a voicemail with name and number for call back.     Addendum 1504  [facility]erm[facility] TAYLOR working on transport and requesting number of stairs for GMT.   LSW called pt's granddaughterSherie and LSW informed that there are no stairs. LSW provided update to Hermila TAYLOR.     Addendum 1512  LSW informed by Hermila TAYLOR that transport confirmed for 0930 tomorrow.     LSW called to provide update to ranjeet/Sherie LOWE. No answer. LSW left a voicemail with brief update and call back number in case she has any questions or concerns.      LSW provided update to bedside RNEli, Dr. Maki, and hospice RNClaudia.

## 2022-01-09 NOTE — DISCHARGE PLANNING
Received Transport Form @ 7357  Spoke to Claire @ [facility]  Transport is scheduled for 1/10/22 @7503 going to home:      Trip #[id number]    Quote for transport $91.41    LSW notified via Teams.

## 2022-01-09 NOTE — CARE PLAN
The patient is Stable - Low risk of patient condition declining or worsening    Shift Goals  Clinical Goals: comfort  Patient Goals: discharge  Family Goals: Comfort and Safety    Progress made toward(s) clinical / shift goals:  no c/o pain, plan to d/c tomorrow    Patient is not progressing towards the following goals: n/a      Problem: Knowledge Deficit - Standard  Goal: Patient and family/care givers will demonstrate understanding of plan of care, disease process/condition, diagnostic tests and medications  Outcome: Progressing   Discussed POC with patient and son, answered questions  Problem: Pain - Standard  Goal: Alleviation of pain or a reduction in pain to the patient’s comfort goal  Outcome: Progressing   No pain reported at this time.

## 2022-01-10 NOTE — TELEPHONE ENCOUNTER
----- Message from Shivani Shankar sent at 1/7/2022  1:34 PM PST -----  Regarding: pt needs an appt w/gen card  Greetings,  This pt will also need an appt with gen card. She is followed by [facility] as well.  This is a [facility] patient.  She is still admitted (fyi)  Thank you,  Shivani

## 2022-01-10 NOTE — DISCHARGE SUMMARY
Discharge Summary    CHIEF COMPLAINT ON ADMISSION  Chief Complaint   Patient presents with   • Syncope     per EMS pt was sitting on side of bed and passed out. Pt with low BP and new onset A-fib    • Hypotension       Reason for Admission  EMS     Admission Date  1/6/2022    CODE STATUS  Prior    HPI & HOSPITAL COURSE  89-year-old female with a past medical history of Alzheimer's dementia was admitted on 1/6/2022 for new atrial fibrillation with rapid ventricular response complicated by type II ischemia and hypotension.  Patient received IV hydration and was started on rate control medications.  Troponin continued to increase.  After having an extensive conversation with patient's DPOA regarding patient's quality of life and poor prognosis, DPOA requested the patient be placed on comfort care which was initiated on 1/7/2022.  She was evaluated by hospice and was discharged on hospice with comfort care.  All results and plan of action were discussed with the patient and family members which they voiced understanding and agree with the primary care team.  Patient was discharged to home hospice for continuity of care.    Therefore, she is discharged in good and stable condition to hospice.    The patient met 2-midnight criteria for an inpatient stay at the time of discharge.    Discharge Date  1/10/2022    FOLLOW UP ITEMS POST DISCHARGE  Hospice to follow comfort care measures    DISCHARGE DIAGNOSES  Principal Problem:    Atrial fibrillation with RVR (HCC) POA: Yes  Active Problems:    Cerebrovascular accident (CVA) due to embolism (HCC) POA: Yes    Syncope POA: Yes    Normocytic anemia POA: Yes    High anion gap metabolic acidosis POA: Yes    Stage 3a chronic kidney disease (HCC) POA: Yes    Dehydration POA: Yes    Hypotension POA: Yes  Resolved Problems:    * No resolved hospital problems. *      FOLLOW UP  No future appointments.  [facility]      As needed    [facility]  [address]  [place] [address]  [phone]          MEDICATIONS ON DISCHARGE     Medication List      START taking these medications      Instructions   melatonin 5 mg Tabs   Take 1 Tablet by mouth at bedtime as needed (Insomnia).  Dose: 5 mg        CONTINUE taking these medications      Instructions   acetaminophen 650 MG CR tablet  Commonly known as: TYLENOL   Take 650 mg by mouth every morning. Indications: Pain  Dose: 650 mg        STOP taking these medications    aspirin 81 MG Chew chewable tablet  Commonly known as: ASA     atorvastatin 40 MG Tabs  Commonly known as: LIPITOR     irbesartan-hydrochlorothiazide 300-12.5 MG per tablet  Commonly known as: AVALIDE     oxybutynin 5 MG Tabs  Commonly known as: DITROPAN     traZODone 50 MG Tabs  Commonly known as: DESYREL     Voltaren 1 % Gel  Generic drug: diclofenac sodium            Allergies  No Known Allergies    DIET  No orders of the defined types were placed in this encounter.      ACTIVITY  As tolerated.  Weight bearing as tolerated    CONSULTATIONS  None    PROCEDURES  None    LABORATORY  Lab Results   Component Value Date    SODIUM 132 (L) 01/07/2022    POTASSIUM 5.0 01/07/2022    CHLORIDE 103 01/07/2022    CO2 14 (L) 01/07/2022    GLUCOSE 85 01/07/2022    BUN 71 (HH) 01/07/2022    CREATININE 3.07 (H) 01/07/2022    CREATININE 1.2 02/10/2009        Lab Results   Component Value Date    WBC 6.2 01/07/2022    HEMOGLOBIN 7.9 (L) 01/07/2022    HEMATOCRIT 27.6 (L) 01/07/2022    PLATELETCT 232 01/07/2022        Total time of the discharge process exceeds 34 minutes.

## 2022-01-10 NOTE — CARE PLAN
The patient is Stable - Low risk of patient condition declining or worsening    Shift Goals  Clinical Goals: Comfort  Patient Goals: Discharge  Family Goals: Comfort    Progress made toward(s) clinical / shift goals:  Patient has remained comfortable.     Patient is not progressing towards the following goals:      Problem: Knowledge Deficit - Standard  Goal: Patient and family/care givers will demonstrate understanding of plan of care, disease process/condition, diagnostic tests and medications  Outcome: Progressing     Problem: Fall Risk  Goal: Patient will remain free from falls  Outcome: Progressing

## 2022-01-10 NOTE — DISCHARGE INSTRUCTIONS
Discharge Instructions    Discharged to home by medical transportation with escort. Discharged via ambulance, hospital escort: Yes.  Special equipment needed: Not Applicable    Be sure to schedule a follow-up appointment with your primary care doctor or any specialists as instructed.     Discharge Plan:   Diet Plan: Discussed  Activity Level: Discussed  Confirmed Follow up Appointment: Patient to Call and Schedule Appointment  Confirmed Symptoms Management: Discussed  Medication Reconciliation Updated: Yes  Influenza Vaccine Indication: Patient Refuses    I understand that a diet low in cholesterol, fat, and sodium is recommended for good health. Unless I have been given specific instructions below for another diet, I accept this instruction as my diet prescription.   Other diet: Healthy diet as before hospital admission    Special Instructions: None    · Is patient discharged on Warfarin / Coumadin?   No           Hospice  Hospice is a service that is designed to provide people who are terminally ill and their families with medical, spiritual, and psychological support. Its aim is to improve your quality of life by keeping you as comfortable as possible in the final stages of life.  Who will be my providers when I begin hospice care?  Hospice teams often include:  · A nurse.  · A doctor. The hospice doctor will be available for your care, but you can include your regular doctor or nurse practitioner.  · A .  · A counselor.  · A  (such as a ).  · A dietitian.  · Therapists.  · Trained volunteers who can help with care.  What services does hospice provide?  Hospice services can vary depending on the center or organization. Generally, they include:  · Ways to keep you comfortable, such as:  ? Providing care in your home or in a home-like setting.  ? Working with your family and friends to help meet your needs.  ? Allowing you to enjoy the support of loved ones by receiving much of  your basic care from family and friends.  · Pain relief and symptom management. The staff will supply all necessary medicines and equipment so that you can stay comfortable and alert enough to enjoy the company of your friends and family.  · Visits or care from a nurse and doctor. This may include 24-hour on-call services.  · Companionship when you are alone.  · Allowing you and your family to rest. Hospice staff may do light housekeeping, prepare meals, and run errands.  · Counseling. They will make sure your emotional, spiritual, and social needs are being met, as well as those needs of your family members.  · Spiritual care. This will be individualized to meet your needs and your family's needs. It may involve:  ? Helping you and your family understand the dying process.  ? Helping you say goodbye to your family and friends.  ? Performing a specific Anabaptism ceremony or ritual.  · Massage.  · Nutrition therapy.  · Physical and occupational therapy.  · Short-term inpatient care, if something cannot be managed in the home.  · Art or music therapy.  · Bereavement support for grieving family members.  When should hospice care begin?  Most people who use hospice are believed to have less than 6 months to live.  · Your family and health care providers can help you decide when hospice services should begin.  · If you live longer than 6 months but your condition does not improve, your doctor may be able to approve you for continued hospice care.  · If your condition improves, you may discontinue the program.  What should I consider before selecting a program?  Most hospice programs are run by nonprofit, independent organizations. Some are affiliated with hospitals, nursing homes, or home health care agencies. Hospice programs can take place in your home or at a hospice center, hospital, or skilled nursing facility. When choosing a hospice program, ask the following questions:  · What services are available to  me?  · What services will be offered to my loved ones?  · How involved will my loved ones be?  · How involved will my health care provider be?  · Who makes up the hospice care team? How are they trained or screened?  · How will my pain and symptoms be managed?  · If my circumstances change, can the services be provided in a different setting, such as my home or in the hospital?  · Is the program reviewed and licensed by the state or certified in some other way?  · What does it cost? Is it covered by insurance?  · If I choose a hospice center or nursing home, where is the hospice center located? Is it convenient for family and friends?  · If I choose a hospice center or nursing home, can my family and friends visit any time?  · Will you provide emotional and spiritual support?  · Who can my family call with questions?  Where can I learn more about hospice?  You can learn about existing hospice programs in your area from your health care providers. You can also read more about hospice online. The websites of the following organizations have helpful information:  · National Hospice and Palliative Care Organization (NHPCO): www.nhpco.org  · National Association for Home Care & Hospice (NAHC): www.nahc.org  · Hospice Foundation of [place] (HFA): www.hospicefoundation.org  · American Cancer Society (ACS): www.cancer.org  · Hospice Net: www.hospicenet.org  · Visiting Nurse Associations of [place] (VNAA): www.vnaa.org  You may also find more information by contacting the following agencies:  · A local agency on aging.  · Your local [facility] chapter.  · Your state's department of health or .  Summary  · Hospice is a service that is designed to provide people who are terminally ill and their families with medical, spiritual, and psychological support.  · Hospice aims to improve your quality of life by keeping you as comfortable as possible in the final stages of life.  · Hospice teams often include a doctor,  nurse, , counselor, ,dietitian, therapists, and volunteers.  · Hospice care generally includes medicine for symptom management, visits from doctors and nurses, physical and occupational therapy, nutrition counseling, spiritual and emotional counseling, caregiver support, and bereavement support for grieving family members.  · Hospice programs can take place in your home or at a hospice center, hospital, or skilled nursing facility.  This information is not intended to replace advice given to you by your health care provider. Make sure you discuss any questions you have with your health care provider.  Document Released: 04/05/2005 Document Revised: 11/30/2018 Document Reviewed: 01/09/2018  [employer] Patient Education © 2020 [employer]  Depression / Suicide Risk    As you are discharged from this [facility], it is important to learn how to keep safe from harming yourself.    Recognize the warning signs:  · Abrupt changes in personality, positive or negative- including increase in energy   · Giving away possessions  · Change in eating patterns- significant weight changes-  positive or negative  · Change in sleeping patterns- unable to sleep or sleeping all the time   · Unwillingness or inability to communicate  · Depression  · Unusual sadness, discouragement and loneliness  · Talk of wanting to die  · Neglect of personal appearance   · Rebelliousness- reckless behavior  · Withdrawal from people/activities they love  · Confusion- inability to concentrate     If you or a loved one observes any of these behaviors or has concerns about self-harm, here's what you can do:  · Talk about it- your feelings and reasons for harming yourself  · Remove any means that you might use to hurt yourself (examples: pills, rope, extension cords, firearm)  · Get professional help from the community (Mental Health, Substance Abuse, psychological counseling)  · Do not be alone:Call your Safe  Contact- someone whom you trust who will be there for you.  · Call your local CRISIS HOTLINE 222-1856 or 115-510-0342  · Call your local Children's Mobile Crisis Response Team [place] (699) 704-0283 or www.[employer]  · Call the toll free National Suicide Prevention Hotlines   · National Suicide Prevention Lifeline 718-343-EAXF (1489)  · National Pricing Assistant Line Network 800-SUICIDE (434-9614)

## 2022-01-11 NOTE — CASE COMMUNICATION
noted  ----- Message -----  From: Nicky Pacheco R.N.  Sent: 1/10/2022   2:10 PM PST  To: Gayle Roberts R.N.      Discharge patient from Home Health services , pt discharging from hospital with Hospice

## 2022-01-12 NOTE — CASE COMMUNICATION
Quality Review for 1.7.22 Transfer OASIS performed on by LULY Bee RN on 1.11.2022:    Edits completed by LULY Bee RN:  1. Date clarified transfer order to 1.6.22 per pt station  2.Changed  C to yes per POC

## 2022-01-12 NOTE — DOCUMENTATION QUERY
[facility]                                                                       Query Response Note      PATIENT:               SHERRIE MCKENZIE  ACCT #:                  [id number]  MRN:                     [id number]  :                      [id number]  ADMIT DATE:       2021 4:52 PM  DISCH DATE:        2021 3:30 PM  RESPONDING  PROVIDER #:        [id number]           QUERY TEXT:    Congestive Heart Failure is documented in the Medical Record. Please document the type and acuity (includes probable or suspected).     NOTE:  If an appropriate response is not listed below, please respond with a new note.    The patient's Clinical Indicators include:  Clinical-ED-Congestive heart failure, unspecified HF chronicity, unspecified heart failure type (HCC  SOB (shortness of breath)  Assessment & Plan  Suspect to be related to new onset heart failure  D/S-was eventually admitted for volume overload and possible CHF work-up    Treatment-Trial Lasix tonight, reassess in the a.m.  Check echo  Risks-HTN, CKD, acute renal failure    [email or web address]  Options provided:   -- Acute Systolic heart failure   -- Chronic Systolic heart failure   -- Acute on Chronic Systolic heart failure   -- Acute Diastolic heart failure   -- Chronic Diastolic heart failure   -- Acute on Chronic Diastolic heart failure   -- Acute Systolic and Diastolic heart failure   -- Chronic Systolic and Diastolic heart failure   -- Acute on Chronic Systolic and diastolic heart failure   -- Congestive Heart Failure Ruled Out   -- Unable to determine      Query created by: Sirisha Layne on 2022 7:36 AM    RESPONSE TEXT:    Congestive Heart Failure Ruled Out          Electronically signed by:  ELIDA TRINIDAD DO 2022 4:29 PM

## 2022-01-14 NOTE — CASE COMMUNICATION
I agree with the changes    ----- Message -----  From: Mini Bee R.N.  Sent: 1/12/2022   1:21 PM PST  To: Nicky Pacheco R.N.      Quality Review for 1.7.22 Transfer OASIS performed on by LULY Bee RN on 1.11.2022:    Edits completed by LULY Bee RN:  1. Date clarified transfer order to 1.6.22 per pt station  2.Changed  C to yes per POC

## 2024-02-27 NOTE — PROGRESS NOTES
Impression: No evidence of tumor recurrence in the arm or chest.  Despite negative imaging the patient feels there is still a mass in her buttock.    Plan: Patient needs to be scheduled to see me face-to-face in March 23 in the [facility] building.  We will perform a physical exam at that time.    Diagnosis: 2 cm subcutaneous pleomorphic sarcoma.     Treatment: Excision in September 21 following prior excision with positive margins and preoperative radiation.    Patient is seen to review her state her surveillance follow-up studies which are primarily a chest CT and an MRI scan of her operated upper extremity.  She denies feeling any new masses in the arm.  The MRI scan of the arm and chest CT scan been reviewed by myself as well as radiologist.  There is no evidence of recurrence.    At her last visit the patient mentioned a right buttock mass.  Her oncologist examine this and recommended that be excised.  We therefore obtained an MRI scan of the pelvis including the majority of the right buttocks..  The patient reports she placed a marker at the time of the scan.  I was not able to reliably visualized the marker on the new MRI scan of the pelvis.  This could be related to the relationship between the size of the scanner and the size of the patient.  I mention to Harvey if she would like further consideration of having the mass excised she would need to come to the clinic and undergo if face-to-face evaluation physical exam.  She understands this and will schedule an appointment for March 23.    This virtual visit began at 4:50 PM and ended at 5:05 PM.  The total visit was 15 minutes.   This 89-year-old woman presented to [facility] with concern for fluid overload and generalized weakness.  Further history elucidated some focal weakness per the family, and an MRI brain was performed.  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    Based on the imaging, I recommend aspirin 81 mg and atorvastatin with a long-term LDL goal less than 70.  -Check carotid ultrasound bilateral to ensure there is no high-grade stenosis that would warrant surgical attention.  -Long-term cardiac monitoring to monitor for occult A. fib with outpatient neurology follow-up.